# Patient Record
Sex: FEMALE | Race: WHITE | NOT HISPANIC OR LATINO | Employment: OTHER | URBAN - METROPOLITAN AREA
[De-identification: names, ages, dates, MRNs, and addresses within clinical notes are randomized per-mention and may not be internally consistent; named-entity substitution may affect disease eponyms.]

---

## 2017-05-30 ENCOUNTER — ALLSCRIPTS OFFICE VISIT (OUTPATIENT)
Dept: OTHER | Facility: OTHER | Age: 67
End: 2017-05-30

## 2018-01-14 VITALS
HEART RATE: 64 BPM | BODY MASS INDEX: 33.65 KG/M2 | WEIGHT: 209.38 LBS | DIASTOLIC BLOOD PRESSURE: 68 MMHG | HEIGHT: 66 IN | RESPIRATION RATE: 16 BRPM | SYSTOLIC BLOOD PRESSURE: 118 MMHG

## 2018-03-01 ENCOUNTER — TELEPHONE (OUTPATIENT)
Dept: CARDIOLOGY CLINIC | Facility: CLINIC | Age: 68
End: 2018-03-01

## 2018-03-01 NOTE — TELEPHONE ENCOUNTER
Pt recently switched to Raul Leon with Addie Arias as secondary  Bystolic now requires preauthorization and new pharmacy is [facility] ,due to insurance change  Pt brought in authorization phone # [phone] and CASE # [id number]   I tried to start this but was told in is an exclusive medication and the doctor would need to call insurance directly  Not able to be preauthed at number called  Patient given samples  Until we get this worked out  Called [facility] her insurance is [facility] Part D  [phone]   CARD ID #[id number] (QSDQ number was same as above  #) told you need to call [phone] for appeal  Pt states you have tried several medications and this actually worked  Pharmacy states he cost without appeal would be $[phone]  Thanks  This can be done once you are back in office

## 2018-03-12 NOTE — TELEPHONE ENCOUNTER
Dr Kp Bustillo filled out a form that was faxed but received denial   Needs appeal if to continue on Bystolic

## 2018-03-19 RX ORDER — NEBIVOLOL 5 MG/1
1 TABLET ORAL DAILY
COMMUNITY
Start: 2015-10-13 | End: 2018-07-09 | Stop reason: SDUPTHER

## 2018-03-19 RX ORDER — METOPROLOL SUCCINATE 50 MG/1
100 TABLET, EXTENDED RELEASE ORAL
COMMUNITY
Start: 2015-02-24 | End: 2018-07-09 | Stop reason: ALTCHOICE

## 2018-03-19 RX ORDER — PROPYLTHIOURACIL 50 MG/1
100 TABLET ORAL
COMMUNITY
Start: 2015-02-24 | End: 2018-07-09 | Stop reason: ALTCHOICE

## 2018-03-19 RX ORDER — LEVOTHYROXINE SODIUM 0.1 MG/1
100 TABLET ORAL DAILY
Refills: 2 | COMMUNITY
Start: 2018-02-12

## 2018-03-19 RX ORDER — OMEPRAZOLE 20 MG/1
20 CAPSULE, DELAYED RELEASE ORAL DAILY
COMMUNITY
Start: 2011-09-06 | End: 2020-01-20 | Stop reason: SDUPTHER

## 2018-06-04 ENCOUNTER — TELEPHONE (OUTPATIENT)
Dept: CARDIOLOGY CLINIC | Facility: CLINIC | Age: 68
End: 2018-06-04

## 2018-06-04 NOTE — TELEPHONE ENCOUNTER
Pt stopped into office to see if Bystolic sample is available until her F/U OV with [facility] on July 9th  Provided Bystolic 5 mg  Lot # [id number] Exp 10/20 X3 & WO 1539 Exp 04/20

## 2018-07-09 ENCOUNTER — OFFICE VISIT (OUTPATIENT)
Dept: CARDIOLOGY CLINIC | Facility: CLINIC | Age: 68
End: 2018-07-09
Payer: MEDICARE

## 2018-07-09 VITALS
WEIGHT: 216 LBS | RESPIRATION RATE: 16 BRPM | HEIGHT: 66 IN | SYSTOLIC BLOOD PRESSURE: 122 MMHG | DIASTOLIC BLOOD PRESSURE: 72 MMHG | HEART RATE: 66 BPM | BODY MASS INDEX: 34.72 KG/M2

## 2018-07-09 DIAGNOSIS — I48.0 PAF (PAROXYSMAL ATRIAL FIBRILLATION) (HCC): Primary | ICD-10-CM

## 2018-07-09 DIAGNOSIS — I10 BENIGN ESSENTIAL HTN: ICD-10-CM

## 2018-07-09 PROBLEM — E89.0 POSTABLATIVE HYPOTHYROIDISM: Status: ACTIVE | Noted: 2018-07-09

## 2018-07-09 PROCEDURE — 99213 OFFICE O/P EST LOW 20 MIN: CPT | Performed by: INTERNAL MEDICINE

## 2018-07-09 PROCEDURE — 93000 ELECTROCARDIOGRAM COMPLETE: CPT | Performed by: INTERNAL MEDICINE

## 2018-07-09 RX ORDER — NEBIVOLOL 5 MG/1
5 TABLET ORAL DAILY
Qty: 90 TABLET | Refills: 3 | Status: SHIPPED | OUTPATIENT
Start: 2018-07-09 | End: 2019-08-27 | Stop reason: SDUPTHER

## 2018-07-09 NOTE — PROGRESS NOTES
Cardiology Follow Up    [facility]  1950  [id number]  [address]    Reason for visit: Patient here for FU of PAF  Yogi Peñaloza Also has HTN    1  PAF (paroxysmal atrial fibrillation) (HCC)  POCT ECG   2  Benign essential HTN         Interval History: Since her last visit she gets occasional palpitations  She has not had the racing heart beat or sustained palpitations  She denies CP or SOB  She does get some ankle edema  She denies lightheadedness    Patient Active Problem List   Diagnosis    PAF (paroxysmal atrial fibrillation) (HCC)    Benign essential HTN    Postablative hypothyroidism     No past medical history on file  Social History     Social History    Marital status: /Civil Union     Spouse name: N/A    Number of children: N/A    Years of education: N/A     Occupational History    Not on file  Social History Main Topics    Smoking status: Passive Smoke Exposure - Never Smoker    Smokeless tobacco: Never Used    Alcohol use Not on file    Drug use: Unknown    Sexual activity: Not on file     Other Topics Concern    Not on file     Social History Narrative    No narrative on file      No family history on file  No past surgical history on file  Current Outpatient Prescriptions:     aspirin 81 MG tablet, Take 1 tablet by mouth daily, Disp: , Rfl:     Cholecalciferol (TH VITAMIN D3) 2000 units CAPS, Take by mouth, Disp: , Rfl:     levothyroxine 100 mcg tablet, Take 100 mcg by mouth daily, Disp: , Rfl: 2    nebivolol (BYSTOLIC) 5 mg tablet, Take 1 tablet by mouth daily, Disp: , Rfl:     omeprazole (PriLOSEC) 20 mg delayed release capsule, Take by mouth, Disp: , Rfl:   Allergies   Allergen Reactions    No Active Allergies          Review of Systems:  Review of Systems   Constitutional: Negative for fatigue  Respiratory: Negative for shortness of breath  Cardiovascular: Positive for palpitations  Negative for chest pain and leg swelling  Genitourinary: Positive for frequency  Musculoskeletal: Positive for arthralgias and back pain  Psychiatric/Behavioral: Negative for agitation, behavioral problems, confusion and decreased concentration  Physical Exam:  Vitals:    07/09/18 1425   BP: 122/72   Pulse: 66   Resp: 16   Weight: 98 kg (216 lb)   Height: 5' 6" (1 676 m)     Physical Exam   Constitutional: She appears well-developed and well-nourished  No distress  obese   HENT:   Head: Normocephalic and atraumatic  Mouth/Throat: No oropharyngeal exudate  Eyes: Conjunctivae are normal  No scleral icterus  Neck: Neck supple  Normal carotid pulses and no JVD present  Carotid bruit is not present  No thyromegaly present  Cardiovascular: Normal rate, regular rhythm, normal heart sounds and intact distal pulses  Exam reveals no gallop and no friction rub  No murmur heard  Pulmonary/Chest: Breath sounds normal  She has no wheezes  She has no rhonchi  She has no rales  Abdominal: Soft  She exhibits no mass  There is no hepatosplenomegaly  There is no tenderness  Musculoskeletal: She exhibits no edema  Discussion/Summary:  1  PAF-relatively quiet since thyroid surgery  Continue ASA as AC strategy (had major GI bleed on  mg daily) Risk of full AC probably outweighs benefit  2  HTN-well controlled on nebivolol    Continue same      FU one year      Alley Galeana MD

## 2019-02-13 ENCOUNTER — APPOINTMENT (INPATIENT)
Dept: RADIOLOGY | Facility: HOSPITAL | Age: 69
DRG: 494 | End: 2019-02-13
Payer: MEDICARE

## 2019-02-13 ENCOUNTER — ANESTHESIA EVENT (INPATIENT)
Dept: PERIOP | Facility: HOSPITAL | Age: 69
DRG: 494 | End: 2019-02-13
Payer: MEDICARE

## 2019-02-13 ENCOUNTER — ANESTHESIA (INPATIENT)
Dept: PERIOP | Facility: HOSPITAL | Age: 69
DRG: 494 | End: 2019-02-13
Payer: MEDICARE

## 2019-02-13 ENCOUNTER — APPOINTMENT (EMERGENCY)
Dept: RADIOLOGY | Facility: HOSPITAL | Age: 69
DRG: 494 | End: 2019-02-13
Payer: MEDICARE

## 2019-02-13 ENCOUNTER — HOSPITAL ENCOUNTER (INPATIENT)
Facility: HOSPITAL | Age: 69
LOS: 1 days | Discharge: HOME | DRG: 494 | End: 2019-02-14
Attending: EMERGENCY MEDICINE | Admitting: INTERNAL MEDICINE
Payer: MEDICARE

## 2019-02-13 DIAGNOSIS — S82.842A BIMALLEOLAR ANKLE FRACTURE, LEFT, CLOSED, INITIAL ENCOUNTER: Primary | ICD-10-CM

## 2019-02-13 DIAGNOSIS — Z01.810 PRE-OPERATIVE CARDIOVASCULAR EXAM, NEW EKG ABNORMALITIES C/W ISCHEMIA: ICD-10-CM

## 2019-02-13 DIAGNOSIS — R94.31 PRE-OPERATIVE CARDIOVASCULAR EXAM, NEW EKG ABNORMALITIES C/W ISCHEMIA: ICD-10-CM

## 2019-02-13 PROBLEM — S82.402A: Status: ACTIVE | Noted: 2019-02-13
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
## 2019-02-13 PROBLEM — S82.52XA CLOSED FRACTURE OF MEDIAL MALLEOLUS OF LEFT TIBIA: Status: ACTIVE | Noted: 2019-02-13

## 2019-02-13 LAB
ANION GAP SERPL CALCULATED.3IONS-SCNC: 10 MMOL/L (ref 4–13)
ATRIAL RATE: 61 BPM
BASOPHILS # BLD AUTO: 0.04 THOUSANDS/ΜL (ref 0–0.1)
BASOPHILS NFR BLD AUTO: 0 % (ref 0–1)
BUN SERPL-MCNC: 18 MG/DL (ref 5–25)
CALCIUM SERPL-MCNC: 8.1 MG/DL (ref 8.3–10.1)
CHLORIDE SERPL-SCNC: 105 MMOL/L (ref 100–108)
CO2 SERPL-SCNC: 28 MMOL/L (ref 21–32)
CREAT SERPL-MCNC: 0.72 MG/DL (ref 0.6–1.3)
EOSINOPHIL # BLD AUTO: 0.06 THOUSAND/ΜL (ref 0–0.61)
EOSINOPHIL NFR BLD AUTO: 1 % (ref 0–6)
ERYTHROCYTE [DISTWIDTH] IN BLOOD BY AUTOMATED COUNT: 12.9 % (ref 11.6–15.1)
GFR SERPL CREATININE-BSD FRML MDRD: 88 ML/MIN/1.73SQ M
GLUCOSE SERPL-MCNC: 118 MG/DL (ref 65–140)
HCT VFR BLD AUTO: 45.6 % (ref 34.8–46.1)
HGB BLD-MCNC: 14.7 G/DL (ref 11.5–15.4)
IMM GRANULOCYTES # BLD AUTO: 0.04 THOUSAND/UL (ref 0–0.2)
IMM GRANULOCYTES NFR BLD AUTO: 0 % (ref 0–2)
LYMPHOCYTES # BLD AUTO: 1.32 THOUSANDS/ΜL (ref 0.6–4.47)
LYMPHOCYTES NFR BLD AUTO: 12 % (ref 14–44)
MCH RBC QN AUTO: 30.2 PG (ref 26.8–34.3)
MCHC RBC AUTO-ENTMCNC: 32.2 G/DL (ref 31.4–37.4)
MCV RBC AUTO: 94 FL (ref 82–98)
MONOCYTES # BLD AUTO: 0.72 THOUSAND/ΜL (ref 0.17–1.22)
MONOCYTES NFR BLD AUTO: 6 % (ref 4–12)
NEUTROPHILS # BLD AUTO: 9.1 THOUSANDS/ΜL (ref 1.85–7.62)
NEUTS SEG NFR BLD AUTO: 81 % (ref 43–75)
NRBC BLD AUTO-RTO: 0 /100 WBCS
P AXIS: 48 DEGREES
PLATELET # BLD AUTO: 268 THOUSANDS/UL (ref 149–390)
PMV BLD AUTO: 9.8 FL (ref 8.9–12.7)
POTASSIUM SERPL-SCNC: 3.9 MMOL/L (ref 3.5–5.3)
PR INTERVAL: 146 MS
QRS AXIS: 18 DEGREES
QRSD INTERVAL: 82 MS
QT INTERVAL: 410 MS
QTC INTERVAL: 412 MS
RBC # BLD AUTO: 4.86 MILLION/UL (ref 3.81–5.12)
SODIUM SERPL-SCNC: 143 MMOL/L (ref 136–145)
T WAVE AXIS: -4 DEGREES
TROPONIN I SERPL-MCNC: <0.02 NG/ML
VENTRICULAR RATE: 61 BPM
WBC # BLD AUTO: 11.28 THOUSAND/UL (ref 4.31–10.16)

## 2019-02-13 PROCEDURE — 27814 TREATMENT OF ANKLE FRACTURE: CPT | Performed by: ORTHOPAEDIC SURGERY

## 2019-02-13 PROCEDURE — 84484 ASSAY OF TROPONIN QUANT: CPT | Performed by: INTERNAL MEDICINE

## 2019-02-13 PROCEDURE — 0QSH0ZZ REPOSITION LEFT TIBIA, OPEN APPROACH: ICD-10-PCS | Performed by: INTERNAL MEDICINE

## 2019-02-13 PROCEDURE — 99223 1ST HOSP IP/OBS HIGH 75: CPT | Performed by: ORTHOPAEDIC SURGERY

## 2019-02-13 PROCEDURE — C1713 ANCHOR/SCREW BN/BN,TIS/BN: HCPCS | Performed by: ORTHOPAEDIC SURGERY

## 2019-02-13 PROCEDURE — 73610 X-RAY EXAM OF ANKLE: CPT

## 2019-02-13 PROCEDURE — 99221 1ST HOSP IP/OBS SF/LOW 40: CPT | Performed by: INTERNAL MEDICINE

## 2019-02-13 PROCEDURE — 99223 1ST HOSP IP/OBS HIGH 75: CPT | Performed by: NURSE PRACTITIONER

## 2019-02-13 PROCEDURE — 93005 ELECTROCARDIOGRAM TRACING: CPT

## 2019-02-13 PROCEDURE — 80048 BASIC METABOLIC PNL TOTAL CA: CPT | Performed by: EMERGENCY MEDICINE

## 2019-02-13 PROCEDURE — 85025 COMPLETE CBC W/AUTO DIFF WBC: CPT | Performed by: EMERGENCY MEDICINE

## 2019-02-13 PROCEDURE — 99285 EMERGENCY DEPT VISIT HI MDM: CPT

## 2019-02-13 PROCEDURE — 36415 COLL VENOUS BLD VENIPUNCTURE: CPT | Performed by: EMERGENCY MEDICINE

## 2019-02-13 PROCEDURE — 0QSK04Z REPOSITION LEFT FIBULA WITH INTERNAL FIXATION DEVICE, OPEN APPROACH: ICD-10-PCS | Performed by: INTERNAL MEDICINE

## 2019-02-13 PROCEDURE — 96375 TX/PRO/DX INJ NEW DRUG ADDON: CPT

## 2019-02-13 PROCEDURE — 96374 THER/PROPH/DIAG INJ IV PUSH: CPT

## 2019-02-13 PROCEDURE — 93010 ELECTROCARDIOGRAM REPORT: CPT | Performed by: INTERNAL MEDICINE

## 2019-02-13 PROCEDURE — C1769 GUIDE WIRE: HCPCS | Performed by: ORTHOPAEDIC SURGERY

## 2019-02-13 PROCEDURE — 27814 TREATMENT OF ANKLE FRACTURE: CPT | Performed by: PHYSICIAN ASSISTANT

## 2019-02-13 DEVICE — 4.0MM CANCELLOUS BONE SCREW FULLY THREADED/16MM: Type: IMPLANTABLE DEVICE | Site: ANKLE | Status: FUNCTIONAL

## 2019-02-13 DEVICE — 4.0MM CANCELLOUS BONE SCREW FULLY THREADED/18MM: Type: IMPLANTABLE DEVICE | Site: ANKLE | Status: FUNCTIONAL

## 2019-02-13 DEVICE — LCP ONE-THIRD TUBULAR PLATE WITH COLLAR 7 HOLES/81MM
Type: IMPLANTABLE DEVICE | Site: ANKLE | Status: FUNCTIONAL
Brand: LCP

## 2019-02-13 DEVICE — 4.0MM CANNULATED SCREW LONG THREAD/30MM: Type: IMPLANTABLE DEVICE | Site: ANKLE | Status: FUNCTIONAL

## 2019-02-13 DEVICE — 3.5MM CORTEX SCREW SELF-TAPPING 26MM: Type: IMPLANTABLE DEVICE | Site: ANKLE | Status: FUNCTIONAL

## 2019-02-13 DEVICE — 3.5MM CORTEX SCREW SELF-TAPPING 14MM: Type: IMPLANTABLE DEVICE | Site: ANKLE | Status: FUNCTIONAL

## 2019-02-13 DEVICE — 4.0MM CANNULATED SCREW-LONG THREAD 52MM: Type: IMPLANTABLE DEVICE | Site: ANKLE | Status: FUNCTIONAL

## 2019-02-13 RX ORDER — PROPOFOL 10 MG/ML
INJECTION, EMULSION INTRAVENOUS AS NEEDED
Status: DISCONTINUED | OUTPATIENT
Start: 2019-02-13 | End: 2019-02-13 | Stop reason: SURG

## 2019-02-13 RX ORDER — HYDROMORPHONE HCL 110MG/55ML
2 PATIENT CONTROLLED ANALGESIA SYRINGE INTRAVENOUS ONCE
Status: COMPLETED | OUTPATIENT
Start: 2019-02-13 | End: 2019-02-13

## 2019-02-13 RX ORDER — OXYCODONE HYDROCHLORIDE 5 MG/1
5 TABLET ORAL
Status: DISCONTINUED | OUTPATIENT
Start: 2019-02-13 | End: 2019-02-14 | Stop reason: HOSPADM

## 2019-02-13 RX ORDER — HYDROMORPHONE HCL/PF 1 MG/ML
0.5 SYRINGE (ML) INJECTION
Status: DISCONTINUED | OUTPATIENT
Start: 2019-02-13 | End: 2019-02-13 | Stop reason: HOSPADM

## 2019-02-13 RX ORDER — ONDANSETRON 2 MG/ML
4 INJECTION INTRAMUSCULAR; INTRAVENOUS EVERY 6 HOURS PRN
Status: DISCONTINUED | OUTPATIENT
Start: 2019-02-13 | End: 2019-02-13 | Stop reason: SDUPTHER

## 2019-02-13 RX ORDER — FENTANYL CITRATE/PF 50 MCG/ML
25 SYRINGE (ML) INJECTION
Status: DISCONTINUED | OUTPATIENT
Start: 2019-02-13 | End: 2019-02-13 | Stop reason: HOSPADM

## 2019-02-13 RX ORDER — SODIUM CHLORIDE 9 MG/ML
125 INJECTION, SOLUTION INTRAVENOUS CONTINUOUS
Status: DISCONTINUED | OUTPATIENT
Start: 2019-02-13 | End: 2019-02-13 | Stop reason: SDUPTHER

## 2019-02-13 RX ORDER — DEXAMETHASONE SODIUM PHOSPHATE 4 MG/ML
INJECTION, SOLUTION INTRA-ARTICULAR; INTRALESIONAL; INTRAMUSCULAR; INTRAVENOUS; SOFT TISSUE AS NEEDED
Status: DISCONTINUED | OUTPATIENT
Start: 2019-02-13 | End: 2019-02-13 | Stop reason: SURG

## 2019-02-13 RX ORDER — ACETAMINOPHEN 325 MG/1
650 TABLET ORAL EVERY 6 HOURS PRN
Status: DISCONTINUED | OUTPATIENT
Start: 2019-02-13 | End: 2019-02-13 | Stop reason: SDUPTHER

## 2019-02-13 RX ORDER — ACETAMINOPHEN 325 MG/1
650 TABLET ORAL EVERY 6 HOURS PRN
Status: DISCONTINUED | OUTPATIENT
Start: 2019-02-13 | End: 2019-02-14 | Stop reason: HOSPADM

## 2019-02-13 RX ORDER — DOCUSATE SODIUM 100 MG/1
100 CAPSULE, LIQUID FILLED ORAL 2 TIMES DAILY
Status: DISCONTINUED | OUTPATIENT
Start: 2019-02-13 | End: 2019-02-14 | Stop reason: HOSPADM

## 2019-02-13 RX ORDER — MAGNESIUM HYDROXIDE 1200 MG/15ML
LIQUID ORAL AS NEEDED
Status: DISCONTINUED | OUTPATIENT
Start: 2019-02-13 | End: 2019-02-13 | Stop reason: HOSPADM

## 2019-02-13 RX ORDER — SODIUM CHLORIDE 9 MG/ML
125 INJECTION, SOLUTION INTRAVENOUS CONTINUOUS
Status: DISCONTINUED | OUTPATIENT
Start: 2019-02-13 | End: 2019-02-13

## 2019-02-13 RX ORDER — CEFAZOLIN SODIUM 2 G/50ML
2000 SOLUTION INTRAVENOUS ONCE
Status: COMPLETED | OUTPATIENT
Start: 2019-02-13 | End: 2019-02-13

## 2019-02-13 RX ORDER — FENTANYL CITRATE 50 UG/ML
INJECTION, SOLUTION INTRAMUSCULAR; INTRAVENOUS AS NEEDED
Status: DISCONTINUED | OUTPATIENT
Start: 2019-02-13 | End: 2019-02-13 | Stop reason: SURG

## 2019-02-13 RX ORDER — OXYCODONE HYDROCHLORIDE 10 MG/1
10 TABLET ORAL
Status: DISCONTINUED | OUTPATIENT
Start: 2019-02-13 | End: 2019-02-14 | Stop reason: HOSPADM

## 2019-02-13 RX ORDER — MIDAZOLAM HYDROCHLORIDE 1 MG/ML
INJECTION INTRAMUSCULAR; INTRAVENOUS AS NEEDED
Status: DISCONTINUED | OUTPATIENT
Start: 2019-02-13 | End: 2019-02-13 | Stop reason: SURG

## 2019-02-13 RX ORDER — EPHEDRINE SULFATE 50 MG/ML
INJECTION, SOLUTION INTRAVENOUS AS NEEDED
Status: DISCONTINUED | OUTPATIENT
Start: 2019-02-13 | End: 2019-02-13 | Stop reason: SURG

## 2019-02-13 RX ORDER — ONDANSETRON 2 MG/ML
4 INJECTION INTRAMUSCULAR; INTRAVENOUS EVERY 6 HOURS PRN
Status: DISCONTINUED | OUTPATIENT
Start: 2019-02-13 | End: 2019-02-14 | Stop reason: HOSPADM

## 2019-02-13 RX ORDER — MORPHINE SULFATE 4 MG/ML
4 INJECTION, SOLUTION INTRAMUSCULAR; INTRAVENOUS ONCE
Status: COMPLETED | OUTPATIENT
Start: 2019-02-13 | End: 2019-02-13

## 2019-02-13 RX ORDER — LEVOTHYROXINE SODIUM 0.1 MG/1
100 TABLET ORAL DAILY
Status: DISCONTINUED | OUTPATIENT
Start: 2019-02-14 | End: 2019-02-14 | Stop reason: HOSPADM

## 2019-02-13 RX ORDER — MELATONIN
1000 DAILY
Status: DISCONTINUED | OUTPATIENT
Start: 2019-02-14 | End: 2019-02-14 | Stop reason: HOSPADM

## 2019-02-13 RX ORDER — ROCURONIUM BROMIDE 10 MG/ML
INJECTION, SOLUTION INTRAVENOUS AS NEEDED
Status: DISCONTINUED | OUTPATIENT
Start: 2019-02-13 | End: 2019-02-13 | Stop reason: SURG

## 2019-02-13 RX ORDER — ONDANSETRON 2 MG/ML
4 INJECTION INTRAMUSCULAR; INTRAVENOUS ONCE
Status: DISCONTINUED | OUTPATIENT
Start: 2019-02-13 | End: 2019-02-13 | Stop reason: HOSPADM

## 2019-02-13 RX ORDER — HEPARIN SODIUM 5000 [USP'U]/ML
5000 INJECTION, SOLUTION INTRAVENOUS; SUBCUTANEOUS EVERY 8 HOURS SCHEDULED
Status: DISCONTINUED | OUTPATIENT
Start: 2019-02-13 | End: 2019-02-13 | Stop reason: SDUPTHER

## 2019-02-13 RX ORDER — NEBIVOLOL 10 MG/1
5 TABLET ORAL DAILY
Status: DISCONTINUED | OUTPATIENT
Start: 2019-02-14 | End: 2019-02-14 | Stop reason: SDUPTHER

## 2019-02-13 RX ORDER — SODIUM CHLORIDE 9 MG/ML
75 INJECTION, SOLUTION INTRAVENOUS CONTINUOUS
Status: DISCONTINUED | OUTPATIENT
Start: 2019-02-13 | End: 2019-02-14 | Stop reason: HOSPADM

## 2019-02-13 RX ORDER — GLYCOPYRROLATE 0.2 MG/ML
INJECTION INTRAMUSCULAR; INTRAVENOUS AS NEEDED
Status: DISCONTINUED | OUTPATIENT
Start: 2019-02-13 | End: 2019-02-13 | Stop reason: SURG

## 2019-02-13 RX ORDER — NEBIVOLOL 10 MG/1
5 TABLET ORAL DAILY
Status: DISCONTINUED | OUTPATIENT
Start: 2019-02-13 | End: 2019-02-14 | Stop reason: HOSPADM

## 2019-02-13 RX ORDER — HYDROMORPHONE HCL/PF 1 MG/ML
0.5 SYRINGE (ML) INJECTION
Status: DISCONTINUED | OUTPATIENT
Start: 2019-02-13 | End: 2019-02-14 | Stop reason: HOSPADM

## 2019-02-13 RX ORDER — ASPIRIN 81 MG/1
81 TABLET, CHEWABLE ORAL DAILY
Status: DISCONTINUED | OUTPATIENT
Start: 2019-02-14 | End: 2019-02-14 | Stop reason: HOSPADM

## 2019-02-13 RX ORDER — ONDANSETRON 2 MG/ML
INJECTION INTRAMUSCULAR; INTRAVENOUS AS NEEDED
Status: DISCONTINUED | OUTPATIENT
Start: 2019-02-13 | End: 2019-02-13 | Stop reason: SURG

## 2019-02-13 RX ORDER — PANTOPRAZOLE SODIUM 20 MG/1
20 TABLET, DELAYED RELEASE ORAL
Status: DISCONTINUED | OUTPATIENT
Start: 2019-02-14 | End: 2019-02-14 | Stop reason: HOSPADM

## 2019-02-13 RX ORDER — ROPIVACAINE HYDROCHLORIDE 5 MG/ML
INJECTION, SOLUTION EPIDURAL; INFILTRATION; PERINEURAL
Status: COMPLETED | OUTPATIENT
Start: 2019-02-13 | End: 2019-02-13

## 2019-02-13 RX ORDER — CALCIUM CARBONATE 200(500)MG
1000 TABLET,CHEWABLE ORAL DAILY PRN
Status: DISCONTINUED | OUTPATIENT
Start: 2019-02-13 | End: 2019-02-14 | Stop reason: HOSPADM

## 2019-02-13 RX ORDER — NEOSTIGMINE METHYLSULFATE 1 MG/ML
INJECTION INTRAVENOUS AS NEEDED
Status: DISCONTINUED | OUTPATIENT
Start: 2019-02-13 | End: 2019-02-13 | Stop reason: SURG

## 2019-02-13 RX ADMIN — SODIUM CHLORIDE 75 ML/HR: 0.9 INJECTION, SOLUTION INTRAVENOUS at 22:55

## 2019-02-13 RX ADMIN — ROCURONIUM BROMIDE 20 MG: 10 INJECTION INTRAVENOUS at 13:19

## 2019-02-13 RX ADMIN — CEFAZOLIN SODIUM 2000 MG: 10 INJECTION, POWDER, FOR SOLUTION INTRAVENOUS at 20:00

## 2019-02-13 RX ADMIN — MIDAZOLAM 1 MG: 1 INJECTION INTRAMUSCULAR; INTRAVENOUS at 10:50

## 2019-02-13 RX ADMIN — ONDANSETRON 4 MG: 2 INJECTION INTRAMUSCULAR; INTRAVENOUS at 14:13

## 2019-02-13 RX ADMIN — SODIUM CHLORIDE 75 ML/HR: 0.9 INJECTION, SOLUTION INTRAVENOUS at 17:58

## 2019-02-13 RX ADMIN — PROPOFOL 200 MG: 10 INJECTION, EMULSION INTRAVENOUS at 11:29

## 2019-02-13 RX ADMIN — ROCURONIUM BROMIDE 50 MG: 10 INJECTION INTRAVENOUS at 11:31

## 2019-02-13 RX ADMIN — SODIUM CHLORIDE: 0.9 INJECTION, SOLUTION INTRAVENOUS at 11:57

## 2019-02-13 RX ADMIN — MIDAZOLAM 2 MG: 1 INJECTION INTRAMUSCULAR; INTRAVENOUS at 11:21

## 2019-02-13 RX ADMIN — ROPIVACAINE HYDROCHLORIDE 25 ML: 5 INJECTION, SOLUTION EPIDURAL; INFILTRATION; PERINEURAL at 10:51

## 2019-02-13 RX ADMIN — LIDOCAINE HYDROCHLORIDE 50 MG: 20 INJECTION, SOLUTION INTRAVENOUS at 11:29

## 2019-02-13 RX ADMIN — SODIUM CHLORIDE: 0.9 INJECTION, SOLUTION INTRAVENOUS at 11:18

## 2019-02-13 RX ADMIN — FENTANYL CITRATE 25 MCG: 50 INJECTION, SOLUTION INTRAMUSCULAR; INTRAVENOUS at 10:51

## 2019-02-13 RX ADMIN — FENTANYL CITRATE 50 MCG: 50 INJECTION, SOLUTION INTRAMUSCULAR; INTRAVENOUS at 13:20

## 2019-02-13 RX ADMIN — SODIUM CHLORIDE 125 ML/HR: 0.9 INJECTION, SOLUTION INTRAVENOUS at 10:19

## 2019-02-13 RX ADMIN — NEOSTIGMINE METHYLSULFATE 3 MG: 1 INJECTION INTRAVENOUS at 14:13

## 2019-02-13 RX ADMIN — HYDROMORPHONE HYDROCHLORIDE 2 MG: 2 INJECTION INTRAMUSCULAR; INTRAVENOUS; SUBCUTANEOUS at 05:39

## 2019-02-13 RX ADMIN — DOCUSATE SODIUM 100 MG: 100 CAPSULE, LIQUID FILLED ORAL at 17:57

## 2019-02-13 RX ADMIN — FENTANYL CITRATE 50 MCG: 50 INJECTION, SOLUTION INTRAMUSCULAR; INTRAVENOUS at 10:46

## 2019-02-13 RX ADMIN — FENTANYL CITRATE 150 MCG: 50 INJECTION, SOLUTION INTRAMUSCULAR; INTRAVENOUS at 11:29

## 2019-02-13 RX ADMIN — EPHEDRINE SULFATE 10 MG: 50 INJECTION, SOLUTION INTRAMUSCULAR; INTRAVENOUS; SUBCUTANEOUS at 13:46

## 2019-02-13 RX ADMIN — MORPHINE SULFATE 4 MG: 4 INJECTION INTRAVENOUS at 05:12

## 2019-02-13 RX ADMIN — SODIUM CHLORIDE: 0.9 INJECTION, SOLUTION INTRAVENOUS at 14:23

## 2019-02-13 RX ADMIN — MIDAZOLAM 1 MG: 1 INJECTION INTRAMUSCULAR; INTRAVENOUS at 11:04

## 2019-02-13 RX ADMIN — ROPIVACAINE HYDROCHLORIDE 15 ML: 5 INJECTION, SOLUTION EPIDURAL; INFILTRATION; PERINEURAL at 11:03

## 2019-02-13 RX ADMIN — GLYCOPYRROLATE 0.4 MG: 0.2 INJECTION, SOLUTION INTRAMUSCULAR; INTRAVENOUS at 14:13

## 2019-02-13 RX ADMIN — DEXAMETHASONE SODIUM PHOSPHATE 4 MG: 4 INJECTION, SOLUTION INTRAMUSCULAR; INTRAVENOUS at 12:33

## 2019-02-13 RX ADMIN — CEFAZOLIN SODIUM 2000 MG: 2 SOLUTION INTRAVENOUS at 11:29

## 2019-02-13 RX ADMIN — FENTANYL CITRATE 25 MCG: 50 INJECTION, SOLUTION INTRAMUSCULAR; INTRAVENOUS at 11:04

## 2019-02-13 RX ADMIN — MIDAZOLAM 2 MG: 1 INJECTION INTRAMUSCULAR; INTRAVENOUS at 10:46

## 2019-02-13 NOTE — PROGRESS NOTES
Progress Note - Orthopedics   Elsie Oar 79 y o  female MRN: [id number]  Unit/Bed#: [facility] AC Encounter: [id number]    Assessment:  79 y o  female s/p ORIF left ankle fracture POD 0    Plan:  Pain control prn  PT/OT- NWB left LE   Lovenox/SCDs for DVT prophylaxis  Abx x 24 h    Subjective: Pt S&E  Resting comfortably  No complaints  Vitals: Blood pressure 134/63, pulse 80, temperature 98 7 °F (37 1 °C), resp  rate (!) 11, weight 101 kg (223 lb 12 3 oz), SpO2 97 %  ,Body mass index is 36 12 kg/m²        Intake/Output Summary (Last 24 hours) at 2/13/2019 1453  Last data filed at 2/13/2019 1423  Gross per 24 hour   Intake 2000 ml   Output 50 ml   Net 1950 ml       Invasive Devices     Peripheral Intravenous Line            Peripheral IV 02/13/19 Right Antecubital less than 1 day                Ortho Exam: leftLE:  splint:  C/D/I, sensation grossly intact L4, L5, S1, brisk capillary refill, EHL/FHL intact    Lab, Imaging and other studies:   CBC:   Lab Results   Component Value Date    WBC 11 28 (H) 02/13/2019    HGB 14 7 02/13/2019    HCT 45 6 02/13/2019    MCV 94 02/13/2019     02/13/2019    MCH 30 2 02/13/2019    MCHC 32 2 02/13/2019    RDW 12 9 02/13/2019    MPV 9 8 02/13/2019    NRBC 0 02/13/2019     CMP:   Lab Results   Component Value Date    SODIUM 143 02/13/2019     02/13/2019    CO2 28 02/13/2019    BUN 18 02/13/2019    CREATININE 0 72 02/13/2019    CALCIUM 8 1 (L) 02/13/2019    EGFR 88 02/13/2019

## 2019-02-13 NOTE — ASSESSMENT & PLAN NOTE
· XR Left: Acute displaced distal fibular and medial malleolar fractures with evidence of ligamentous injury  · Orthopedic consult  · NWB left leg  · Ice Pack  · Elevated extremity  · NPO for possible surgical intervention today  · Pain control

## 2019-02-13 NOTE — OP NOTE
OPERATIVE REPORT  PATIENT NAME: Man Hammer    :  1950  MRN: [id number]  Pt Location: [facility] OR ROOM 03    SURGERY DATE: 2019    Surgeon(s) and Role:     * Keke Cabrera DO - Primary     * Mary Serrano PA-C - Assisting    Preop Diagnosis:  Bimalleolar ankle fracture, left, closed, initial encounter [S82 842A]    Post-Op Diagnosis Codes: * Bimalleolar ankle fracture, left, closed, initial encounter [S82 842A]    Procedure(s) (LRB):  OPEN REDUCTION W/ INTERNAL FIXATION (ORIF) ANKLE (Left)    Specimen(s):  * No specimens in log *    Estimated Blood Loss:   Minimal    Drains:  * No LDAs found *    Anesthesia Type: Adductor block, GA    Operative Indications:  Bimalleolar ankle fracture, left, closed, initial encounter [S82 842A]    Operative Findings:  See below    Complications:   None    Procedure and Technique:  INDICATIONS FOR PROCEDURE:  The patients is a 79 y o  female who presented with ankle pain after injury  Surgery was recommended due to the displacement and angulation of the fracture as well as the unstable nature of the fracture  Extensive counseling in regards to the reasons for surgical intervention as well as the risks and benefits of surgery were reviewed  The risks include, but are not limited to infection, blood clots, wound healing problems, complex regional pain syndrome, further fracture, failure of hardware, need for additional surgery, malunion, nonunion, heart attack, stroke, death  The patient understood and agreed to by oral and written consent  OPERATIVE PROCEDURE:  The patient was identified as Man Hammer by her ID bracelet by the surgical staff in the preoperative area at [address] [facility]   An adductor block was performed and preoperative antibiotics were given  The patient was wheeled back to the surgical room and placed on the operative table  Anesthesia was administered without complications      The patient remained in the supine position and all bony prominences were carefully protected  A tourniquet was applied to the patients left upper thigh  The left leg was then prepped and draped in the usual sterile fashion  A timeout was performed where the patients name and surgical site were once again identified  The incision was marked out  An esmarch was used and the tourniquet was inflated to 300 mmHg  An incision was made over the lateral aspect of the lateral malleolus  Dissection was made through the skin and subcutaneous tissue  The superficial peroneal nerve was identified and protected throughout the case  Once down to bone, the fracture fragments were identified and reduced  This was confirmed with XR  An interfragmentary screw was placed to compress the fracture and then a 1/3 tubular plate was placed over the lateral aspect of the lateral malleolus to neutralize rotational forces  Screws were placed through the plate  Hardware placement was found to be adequate on XR  Next, an incision was marked out and then made over the distal aspect of the medial malleolus  Dissection was made through the skin and subcutaneous tissue  Once down to bone the fracture fragment was identified and reduced  XR was used to confirm alignment  Two wires were placed across the fracture sight followed by 2 cannulated screws under fluoroscopic guidance  Final fixation was confirmed with XR  Finally, an external rotation stress test was performed and the syndesmotic ligament was found to be stable and additional fixation was unneeded  Final XRs were taken  The incisions were copiously irrigated with saline solution  #1 vicryl was used to placed interrupted sutures in deep tissue  The skin and subcutaneous tissue were closed with #2-0 vicryl and the skin was closed with #3-0 nylon  Xeroform and a sterile dressing were placed  The patient was placed in a well-padded splint    The patient was awakened and transferred to the stretcher and then to the recovery room in stable condition  I attest that I was present and performed this procedure  Bibi Doyle PA-C was present for the entire procedure and provided essential assistance with limb position, patient prepping, and retraction    A qualified resident physician was not available    Patient Disposition:  extubated and stable    SIGNATURE: Bienvenido Goddard DO  DATE: February 13, 2019  TIME: 2:39 PM

## 2019-02-13 NOTE — ED PROVIDER NOTES
History  Chief Complaint   Patient presents with    Ankle Injury     Pt c/o left ankle pain and swelling after falling and twisting ankle about one hour PTA; Pt denies hitting her head and denies LOC     Pt is a 79year old female with a PMH of hypertension, hypothyroidism, GERD presenting with left ankle pain after a fall 1 5 hours ago  She states she slipped on the ice and is not sure if she everted or inverted her ankle when she fell, but she now has 6/10 pain in the ankle  She was wearing boots and an ankle brace when she injured her ankle  The ankle immediately became swollen and ecchymosed over the medial malleolus  She was unable to ambulate at the time of injury and now as well  She is able to move her toes and has full sensation  She can weakly flex and extend against resistance  She has 2+ DP on the left  Denies prior fracture in left ankle  Prior to Admission Medications   Prescriptions Last Dose Informant Patient Reported? Taking? Cholecalciferol (TH VITAMIN D3) 2000 units CAPS  Self Yes Yes   Sig: Take 2,000 Units by mouth daily    aspirin 81 MG tablet  Self Yes Yes   Sig: Take 81 mg by mouth daily    levothyroxine 100 mcg tablet  Self Yes Yes   Sig: Take 100 mcg by mouth daily   nebivolol (BYSTOLIC) 5 mg tablet   No Yes   Sig: Take 1 tablet (5 mg total) by mouth daily   omeprazole (PriLOSEC) 20 mg delayed release capsule  Self Yes Yes   Sig: Take 20 mg by mouth daily       Facility-Administered Medications: None       Past Medical History:   Diagnosis Date    Hypertension        Past Surgical History:   Procedure Laterality Date    BREAST LUMPECTOMY      THYROIDECTOMY         Family History   Problem Relation Age of Onset    Lung cancer Family     Coronary artery disease Family      I have reviewed and agree with the history as documented      Social History     Tobacco Use    Smoking status: Passive Smoke Exposure - Never Smoker    Smokeless tobacco: Never Used   Substance Use Topics    Alcohol use: No    Drug use: No        Review of Systems   Constitutional: Negative for activity change, appetite change, chills, diaphoresis, fatigue and fever  Respiratory: Negative for cough, chest tightness and shortness of breath  Cardiovascular: Negative for chest pain  Gastrointestinal: Negative for abdominal pain, constipation, diarrhea, nausea and vomiting  Genitourinary: Negative for decreased urine volume and difficulty urinating  Musculoskeletal: Positive for arthralgias and joint swelling  Neurological: Negative for dizziness, weakness, light-headedness, numbness and headaches  Physical Exam  Physical Exam   Constitutional: She is oriented to person, place, and time  She appears well-developed and well-nourished  No distress  HENT:   Head: Normocephalic and atraumatic  Eyes: Pupils are equal, round, and reactive to light  Conjunctivae and EOM are normal    Neck: Normal range of motion  Neck supple  Cardiovascular: Normal rate, regular rhythm, normal heart sounds and intact distal pulses  Pulses:       Dorsalis pedis pulses are 2+ on the right side, and 2+ on the left side  Pulmonary/Chest: Effort normal and breath sounds normal    Abdominal: Soft  Bowel sounds are normal  She exhibits no distension  There is no tenderness  Musculoskeletal:        Left ankle: She exhibits decreased range of motion, swelling, ecchymosis and deformity  She exhibits no laceration and normal pulse  Tenderness  Lateral malleolus and medial malleolus tenderness found  No head of 5th metatarsal and no proximal fibula tenderness found  Generalized swelling of the left ankle with ecchymosis of the medial malleolus  Deformity and popping sensation during posterior drawer test     Neurological: She is alert and oriented to person, place, and time  No sensory deficit  She exhibits normal muscle tone  Skin: Skin is warm and dry  Capillary refill takes less than 2 seconds   She is not diaphoretic         Vital Signs  ED Triage Vitals [02/13/19 0451]   Temperature Pulse Respirations Blood Pressure SpO2   98 2 °F (36 8 °C) 70 16 166/82 97 %      Temp Source Heart Rate Source Patient Position - Orthostatic VS BP Location FiO2 (%)   Oral Monitor -- -- --      Pain Score       6           Vitals:    02/13/19 0451   BP: 166/82   Pulse: 70       Visual Acuity      ED Medications  Medications   morphine (PF) 4 mg/mL injection 4 mg (has no administration in time range)       Diagnostic Studies  Results Reviewed     None                 XR ankle 3+ views LEFT    (Results Pending)              Procedures  ECG 12 Lead Documentation  Date/Time: 2/13/2019 6:17 AM  Performed by: Ghada Alvarado PA-C  Authorized by: Ghada Alvarado PA-C     ECG reviewed by me, the ED Provider: yes    Patient location:  ED  Previous ECG:     Previous ECG:  Unavailable    Comparison to cardiac monitor: No    Interpretation:     Interpretation: normal    Rate:     ECG rate:  61    ECG rate assessment: normal    Rhythm:     Rhythm: sinus rhythm    Ectopy:     Ectopy: none    QRS:     QRS axis:  Normal    QRS intervals:  Normal  Conduction:     Conduction: normal    T waves:     T waves: non-specific    Static Splint Application  Date/Time: 2/13/2019 6:17 AM  Performed by: Ghada Alvarado PA-C  Authorized by: Ghada Alvarado PA-C     Patient location:  Bedside  Procedure performed by emergency physician: Yes    Other Assisting Provider: Yes (comment) Samia Maxwell    Consent:     Consent obtained:  Verbal    Consent given by:  Patient    Risks discussed:  Discoloration, numbness, pain and swelling    Alternatives discussed:  No treatment, delayed treatment and alternative treatment  Universal protocol:     Patient identity confirmed:  Verbally with patient  Indication:     Indications: fracture    Pre-procedure details:     Sensation:  Normal    Skin color:  Pink  Procedure details:     Laterality:  Left    Location:  Ankle    Ankle:  L ankle    Strapping: no      Splint type:  Long leg and sugar tong    Supplies:  Ortho-Glass  Post-procedure details:     Pain:  Unchanged    Sensation:  Normal    Neurovascular Exam: skin pink, normal pulses and skin intact, warm, and dry      Patient tolerance of procedure: Tolerated well, no immediate complications           Phone Contacts  ED Phone Contact    ED Course                               MDM  Number of Diagnoses or Management Options  Bimalleolar ankle fracture, left, closed, initial encounter:   Diagnosis management comments: Posterior long leg splint with sugar tong, unable to reduce ankle well  Pain controlled with morphine and Dilaudid  It was determined that the patient was unable to use crutches at home and did not feel comfortable with outpatient follow up  She was offered admission and accepted  I spoke to Dr Alanna Duane of orthopedics and she felt that inpatient admission to general medicine was appropriate with consult to orthopedics  Basic labs drawn with normal EKG  Hemodynamically stable  Disposition  Final diagnoses:   None     ED Disposition     None      Follow-up Information    None         Patient's Medications   Discharge Prescriptions    No medications on file     No discharge procedures on file      ED Provider  Electronically Signed by           Bettie Morfin PA-C  02/13/19 5228

## 2019-02-13 NOTE — H&P
H&P- Alfonso Castillo 1950, 79 y o  female MRN: [id number]    Unit/Bed#: ED 14 Encounter: [id number]    Primary Care Provider: Brian Park MD   Date and time admitted to hospital: 2/13/2019  4:49 AM      * Closed fracture of medial malleolus of left tibia  Assessment & Plan  · XR Left: Acute displaced distal fibular and medial malleolar fractures with evidence of ligamentous injury  · Orthopedic consult  · NWB left leg  · Ice Pack  · Elevated extremity  · NPO for possible surgical intervention today  · Pain control    Fall from slipping on ice  Assessment & Plan  · See above plan for fracture  · PT OT consult    PAF (paroxysmal atrial fibrillation) ([facility] [address] )  Assessment & Plan  · As per Cardiology, improved since thyroid surgery  · On ASA 81mg for anticoagulation; had major GI bleed on ASA 384HE  · On Bystolic for rate control    Benign essential HTN  Assessment & Plan  · Maintained on Bystolic      VTE Prophylaxis: Heparin  / reason for no mechanical VTE prophylaxis ac   Code Status: FC  POLST: POLST is not applicable to this patient  Discussion with family:  Spouse at bedside    Anticipated Length of Stay:  Patient will be admitted on an Inpatient basis with an anticipated length of stay of  > 2 midnights  Justification for Hospital Stay:  Fibula and malleolus fracture    Total Time for Visit, including Counseling / Coordination of Care: 45 minutes  Greater than 50% of this total time spent on direct patient counseling and coordination of care  Chief Complaint:   Left ankle pain    History of Present Illness:    Alfonso Castillo is a 79 y o  female who presents with c/o left ankle pain  Patient states she was leaving her house around 3:30a to go to work and slipped on the ice, twisted her left foot and fell on her buttocks  Denies any other symptoms, no numbness, tingling, chest pain, palpitations, shortness of breath, abdominal pain, loss of appetite NVCD or dysuria      Review of Systems:    Review of Systems   Constitutional: Negative  HENT: Negative  Respiratory: Negative  Cardiovascular: Negative  Gastrointestinal: Negative  Genitourinary: Negative  Musculoskeletal:        Left Ankle and buttock pain   Neurological: Negative  Psychiatric/Behavioral: Negative  Past Medical and Surgical History:     Past Medical History:   Diagnosis Date    Hypertension        Past Surgical History:   Procedure Laterality Date    BREAST LUMPECTOMY      THYROIDECTOMY         Meds/Allergies:    Prior to Admission medications    Medication Sig Start Date End Date Taking? Authorizing Provider   aspirin 81 MG tablet Take 81 mg by mouth daily  5/16/14  Yes Historical Provider, MD   Cholecalciferol (TH VITAMIN D3) 2000 units CAPS Take 2,000 Units by mouth daily    Yes Historical Provider, MD   levothyroxine 100 mcg tablet Take 100 mcg by mouth daily 2/12/18  Yes Historical Provider, MD   nebivolol (BYSTOLIC) 5 mg tablet Take 1 tablet (5 mg total) by mouth daily 7/9/18  Yes Anjel Bocanegra MD   omeprazole (PriLOSEC) 20 mg delayed release capsule Take 20 mg by mouth daily  9/6/11  Yes Historical Provider, MD     I have reviewed home medications with patient personally      Allergies: No Known Allergies    Social History:     Marital Status: /Civil Union   Occupation:   Patient Pre-hospital Living Situation: resides at home w spouse  Patient Pre-hospital Level of Mobility: ambulatory  Patient Pre-hospital Diet Restrictions:   Substance Use History:   Social History     Substance and Sexual Activity   Alcohol Use No     Social History     Tobacco Use   Smoking Status Passive Smoke Exposure - Never Smoker   Smokeless Tobacco Never Used     Social History     Substance and Sexual Activity   Drug Use No       Family History:    Family History   Problem Relation Age of Onset    Lung cancer Family     Coronary artery disease Family        Physical Exam:     Vitals:   Blood Pressure: 154/66 (02/13/19 0541)  Pulse: 72 (02/13/19 0541)  Temperature: 98 2 °F (36 8 °C) (02/13/19 0451)  Temp Source: Oral (02/13/19 0451)  Respirations: 16 (02/13/19 0541)  Weight - Scale: 101 kg (223 lb 12 3 oz) (02/13/19 0451)  SpO2: 98 % (02/13/19 0541)    Physical Exam   Constitutional: She appears well-developed and well-nourished  No distress  HENT:   Head: Normocephalic and atraumatic  Eyes: EOM are normal  Right eye exhibits no discharge  Left eye exhibits no discharge  Neck: Neck supple  Cardiovascular: Normal rate, regular rhythm, normal heart sounds and intact distal pulses  No murmur heard  Pulmonary/Chest: Effort normal and breath sounds normal  No stridor  No respiratory distress  She has no wheezes  She has no rales  Abdominal: Soft  Bowel sounds are normal  She exhibits no distension and no mass  There is no tenderness  There is no guarding  Musculoskeletal: She exhibits no edema  Left splint, toes are pink, normal cap refill   Skin: Capillary refill takes less than 2 seconds  She is not diaphoretic  Psychiatric: She has a normal mood and affect  Her behavior is normal  Judgment and thought content normal          Additional Data:     Lab Results: I have personally reviewed pertinent reports  Results from last 7 days   Lab Units 02/13/19  0556   WBC Thousand/uL 11 28*   HEMOGLOBIN g/dL 14 7   HEMATOCRIT % 45 6   PLATELETS Thousands/uL 268   NEUTROS PCT % 81*   LYMPHS PCT % 12*   MONOS PCT % 6   EOS PCT % 1     Results from last 7 days   Lab Units 02/13/19  0556   SODIUM mmol/L 143   POTASSIUM mmol/L 3 9   CHLORIDE mmol/L 105   CO2 mmol/L 28   BUN mg/dL 18   CREATININE mg/dL 0 72   ANION GAP mmol/L 10   CALCIUM mg/dL 8 1*   GLUCOSE RANDOM mg/dL 118                       Imaging: I have personally reviewed pertinent reports        XR ankle 3+ views LEFT   ED Interpretation by Orquidea Vance PA-C (02/13 1928)   bimalleolar fracture       Final Result by Lelsee Cortez MD (02/13 0531)      Acute displaced distal fibular and medial malleolar fractures with evidence of ligamentous injury  The examination demonstrates a significant  finding and was documented as such in Epic for liaison and referring practitioner notification  Workstation performed: [id number]             EKG, Pathology, and Other Studies Reviewed on Admission:   · XR    Allscripts / Epic Records Reviewed: Yes     ** Please Note: This note has been constructed using a voice recognition system   **

## 2019-02-13 NOTE — ANESTHESIA PREPROCEDURE EVALUATION
Review of Systems/Medical History  Patient summary reviewed    History of anesthetic complications PONV    Cardiovascular  Exercise tolerance (METS): >4,  Hypertension controlled,    Pulmonary  Negative pulmonary ROS        GI/Hepatic    GERD well controlled,        Negative  ROS        Endo/Other  History of thyroid disease , hypothyroidism,   Obesity    GYN  Negative gynecology ROS          Hematology  Negative hematology ROS      Musculoskeletal  Negative musculoskeletal ROS   Comment: Left ankle fracture       Neurology  Negative neurology ROS      Psychology   Negative psychology ROS              Physical Exam    Airway    Mallampati score: II  TM Distance: >3 FB  Neck ROM: full     Dental   No notable dental hx     Cardiovascular  Rhythm: regular, Rate: normal,     Pulmonary  Breath sounds clear to auscultation,     Other Findings        Anesthesia Plan  ASA Score- 2     Anesthesia Type- general and regional with ASA Monitors  Additional Monitors:   Airway Plan:     Comment: Consent obtained and placed in chart        Plan Factors-  Patient did not smoke on day of surgery  Induction- intravenous  Postoperative Plan-     Informed Consent- Anesthetic plan and risks discussed with patient

## 2019-02-13 NOTE — ANESTHESIA PROCEDURE NOTES
Peripheral Block    Patient location during procedure: holding area  Start time: 2/13/2019 10:46 AM  Reason for block: at surgeon's request and post-op pain management  Staffing  Anesthesiologist: Anjel Barrett DO  Preanesthetic Checklist  Completed: patient identified, site marked, surgical consent, pre-op evaluation, timeout performed, IV checked, risks and benefits discussed and monitors and equipment checked  Peripheral Block  Patient position: supine  Prep: ChloraPrep  Patient monitoring: continuous pulse ox and frequent blood pressure checks  Block type: popliteal  Laterality: left  Injection technique: single-shot  Procedures: ultrasound guided and nerve stimulator  Ultrasound permanent image savedropivacaine (NAROPIN) 0 5 % perineural infiltration, 25 mL  Needle  Needle type: Stimuplex   Needle gauge: 22 G  Needle length: 10 cm  Needle localization: nerve stimulator  Assessment  Injection assessment: incremental injection, local visualized surrounding nerve on ultrasound, negative aspiration for heme and no paresthesia on injection  Paresthesia pain: none  Heart rate change: no  Slow fractionated injection: yes  Post-procedure:  site cleaned  patient tolerated the procedure well with no immediate complications

## 2019-02-13 NOTE — ED NOTES
Per SLIM, patient to be seen by cardiology for clearance prior to surgery which is tentative for 1030   OR notified on update     Jannet Brunson RN  02/13/19 2645

## 2019-02-13 NOTE — ASSESSMENT & PLAN NOTE
· As per Cardiology, improved since thyroid surgery  · On ASA 81mg for anticoagulation; had major GI bleed on ASA 316SP  · On Bystolic for rate control

## 2019-02-13 NOTE — CONSULTS
Consultation - Leticia Duran 79 y o  female MRN: [id number]  Unit/Bed#: [facility] Encounter: [id number]      Assessment/Plan     Assessment:  L ankle bimalleolar ankle fracture    Plan:  Discussed treatment options with the patient as well as risks and benefits of treatment options  Given the inherent instability and displacement of this fracture surgery is recommended  The risks of surgery include, but are not limited to infection, blood clot, wound healing problems, blood loss, damage to blood vessels and nerves, persistent pain and stiffness, malunion, nonunion, complex regional pain syndrome, damage to tendons and ligaments, need for additional surgery, heart attack, stroke, death  The patient understood and agreed to by oral and written consent  I answered all questions regarding surgery  Will proceed to the OR today  NPO  Pain control p r n  Nonweightbearing left lower extremity  Maintain splint  Patient to follow-up with podiatry or foot and ankle orthopedic specialist regarding foot surgery  Made patient aware we would not be addressing that today  History of Present Illness   Physician Requesting Consult: No att  providers found  Reason for Consult / Principal Problem:  Left ankle fracture  HPI: Kimmy Oshea is a 79 y o  female who presents with left ankle pain status post fall  Patient states she was leaving her house at 3:30 a m  This morning when she stepped out on the ice and fell  She is unable to bear weight after her fall  She denies pain elsewhere  She admits pain in the left ankle  She has a history of surgery on her foot for bunion but states she is having a complication with 1 of the screws  She also notes being flat-footed on that side and uses a support in her shoe  Consults    ROS: see HPI, all other systems negative      Historical Information   Past Medical History:   Diagnosis Date    Hypertension      Past Surgical History:   Procedure Laterality Date  BREAST LUMPECTOMY      THYROIDECTOMY       Social History   Social History     Substance and Sexual Activity   Alcohol Use No     Social History     Substance and Sexual Activity   Drug Use No     Social History     Tobacco Use   Smoking Status Passive Smoke Exposure - Never Smoker   Smokeless Tobacco Never Used     Family History:   Family History   Problem Relation Age of Onset    Lung cancer Family     Coronary artery disease Family        Meds/Allergies   Medications Prior to Admission   Medication    aspirin 81 MG tablet    Cholecalciferol (TH VITAMIN D3) 2000 units CAPS    levothyroxine 100 mcg tablet    nebivolol (BYSTOLIC) 5 mg tablet    omeprazole (PriLOSEC) 20 mg delayed release capsule     No Known Allergies    Objective   Vitals: Blood pressure 138/65, pulse 77, temperature 98 2 °F (36 8 °C), temperature source Oral, resp  rate 16, weight 101 kg (223 lb 12 3 oz), SpO2 97 %  ,Body mass index is 36 12 kg/m²  No intake or output data in the 24 hours ending 02/13/19 1114  No intake/output data recorded      Invasive Devices     Peripheral Intravenous Line            Peripheral IV 02/13/19 Right Antecubital less than 1 day                PE:  Gen:  Awake and alert  HEENT:  Hearing intact  Heart:  Regular rate  Lungs: no audible wheezing  GI: no abdominal distension    Ortho Exam  L ankle:  Splint:  C/D/I  LLE:  Sensation grossly intact L4, L5, S1, brisk capillary refill, EHL/FHL intact    Lab Results:   CBC:   Lab Results   Component Value Date    WBC 11 28 (H) 02/13/2019    HGB 14 7 02/13/2019    HCT 45 6 02/13/2019    MCV 94 02/13/2019     02/13/2019    MCH 30 2 02/13/2019    MCHC 32 2 02/13/2019    RDW 12 9 02/13/2019    MPV 9 8 02/13/2019    NRBC 0 02/13/2019     CMP:   Lab Results   Component Value Date    SODIUM 143 02/13/2019     02/13/2019    CO2 28 02/13/2019    BUN 18 02/13/2019    CREATININE 0 72 02/13/2019    CALCIUM 8 1 (L) 02/13/2019    EGFR 88 02/13/2019     Imaging Studies: I have personally reviewed pertinent films in PACS   X-ray left ankle:  Displaced bimalleolar ankle fracture    Code Status: No Order  Advance Directive and Living Will:      Power of :    POLST:

## 2019-02-13 NOTE — ANESTHESIA PROCEDURE NOTES
Peripheral Block    Patient location during procedure: holding area  Start time: 2/13/2019 10:52 AM  Staffing  Anesthesiologist: Janneth Dia DO  Preanesthetic Checklist  Completed: patient identified, site marked, surgical consent, pre-op evaluation, timeout performed, IV checked, risks and benefits discussed and monitors and equipment checked  Peripheral Block  Patient position: supine  Prep: ChloraPrep  Patient monitoring: heart rate, continuous pulse ox and frequent blood pressure checks  Block type: adductor canal block  Laterality: left  Injection technique: single-shot  Procedures: ultrasound guidedropivacaine (NAROPIN) 0 5 % perineural infiltration, 15 mL  Needle  Needle type: StimupAdoTube   Needle gauge: 22 G  Needle length: 10 cm  Needle localization: ultrasound guidance  Assessment  Injection assessment: incremental injection, local visualized surrounding nerve on ultrasound, negative aspiration for heme and no paresthesia on injection  Paresthesia pain: none  Heart rate change: no  Slow fractionated injection: yes  Post-procedure:  site cleaned  patient tolerated the procedure well with no immediate complications

## 2019-02-13 NOTE — SOCIAL WORK
Federico Garcia is a  63years old  female who was admitted as a result of ankle injury  Patient was alert and oriented times 3  Patient identify her  as emergency  Gaurav Leger   Patient resides at [address] [place] [address] in a single 2 story house with 2 steps to reach main entrance , patient's bedroom and  Bathroom located on the second floor with 12 steps to access  Patient lives witth her    Patient works full time owns a flower shop,  Patient denies any psychiatric diagnosis, no use of illicit drugs and/or alcohol use  Patient denies any legal problems  Patient;s PCP is Dr Shaquille Scott [facility] [id number] [place] [address]  Patient's  will assist with transport at d/c       CM reviewed d/c planning process including the following: identifying help at home, patient preference for d/c planning needs,  [facility] Meds to Bed program, availability of treatment team to discuss questions or concerns patient and/or family may have regarding understanding medications and recognizing signs and symptoms once discharged  CM also encouraged patient to follow up with all recommended appointments after discharge  Patient advised of importance for patient and family to participate in managing patients medical well being

## 2019-02-13 NOTE — CONSULTS
Consult - Cardiology   Richie Cosme 79 y o  female MRN: [id number]  Unit/Bed#: ED 14 Encounter: [id number]          Reason For Consult:  Abnormal ECG    History Of Present Illness: The patient is a 79 yr old WF followed by me for PAF and HTN  [place] She fractured her left ankle slipping on ice today    She is going to the OR  Lennox Kapur concern raised about an "abnormal ECG"  She denies CP or SOB  She has had recent palpitations somewhat increased from baseline  She denies lightheadedness or edema      Past Medical History:   PAF  HTN  Postablative hypothyroidism  GERD      Allergy:  No Known Allergies    Medications:  ASA 81 mg daily  Vit D3  Levothyroxine  nebivolol 5 mg daily  omeptrazole 20 mg daily    Family History:  Lung ca, CAD in mother    Social History:  Never smoked  No ETOH      ROS:  Frequent urination at night    Current ankle pain    Arthritis, back pain        Exam:  154/66  72  General: obese in mild distress from ankle pain  Head: Normocephalic, atraumatic  Eyes:   No Icterus  Normal Conjunctiva  Oropharynx: normal-appearing mucosa and no pharyngitis, no exudate  Neck: supple, symmetrical, trachea midline, thyroid: not enlarged, symmetric, no tenderness/mass/nodules, no carotid bruit and no JVD   Heart: RRR, No: murmer, rub or gallop,   Lungs: normal air entry, lungs clear to auscultation and no rales, rhonchi or wheezing  Abdomen: obese, normal findings: no masses palpable, no organomegaly and soft, non-tender  Lower Limbs: no edema RLE    Pulses ok      ECG: NSR Nonspecific T wave abnls    No change from July 2018  Trop normal  Wbc 11 28  Hb 14 7 plts 268  K 3 9 BUN 18 creat 0 72      ASSESSMENT AND PLAN:   1  Abnormal ECG    No change from baseline    No hx CAD or current sx to suggest ischemia  [place] for OR    Low risk    Patient active w/o cardiac sx  2  PAF  [place] Intermittent and moderately symptomatic  [place] No AC due to idiopathic GI that occurred on ASA in past  Does have elevated CHADS vasc 2 of 3  3   HTN-well controlled on nebivolol generally   Sapna Calvert Perhaps elevated due to pain    No adjustments at this time    Will see prn    Recommend continue beta blocker    Will see if issues arise (unlikely)    Madina Metzger MD

## 2019-02-13 NOTE — ANESTHESIA POSTPROCEDURE EVALUATION
Post-Op Assessment Note    CV Status:  Stable  Pain Score: 2    Pain management: adequate     Mental Status:  Alert and awake   Hydration Status:  Euvolemic   PONV Controlled:  Controlled   Airway Patency:  Patent   Post Op Vitals Reviewed: Yes      Staff: Anesthesiologist           BP      Temp      Pulse     Resp      SpO2

## 2019-02-13 NOTE — ED NOTES
Patients last PO intake was last night approx 1830 and this morning 0300 "just a sip of water with my thyroid meds"  Ortho PAC on phone requesting above info  Will call RN back with tentative OR time       Edna Woodson RN  02/13/19 4824

## 2019-02-14 ENCOUNTER — TELEPHONE (OUTPATIENT)
Dept: SURGERY | Facility: HOSPITAL | Age: 69
End: 2019-02-14

## 2019-02-14 ENCOUNTER — EPISODE CHANGES (OUTPATIENT)
Dept: CASE MANAGEMENT | Facility: HOSPITAL | Age: 69
End: 2019-02-14

## 2019-02-14 VITALS
HEART RATE: 65 BPM | TEMPERATURE: 98.1 F | WEIGHT: 223.77 LBS | SYSTOLIC BLOOD PRESSURE: 121 MMHG | OXYGEN SATURATION: 97 % | DIASTOLIC BLOOD PRESSURE: 71 MMHG | BODY MASS INDEX: 36.12 KG/M2 | RESPIRATION RATE: 18 BRPM

## 2019-02-14 PROBLEM — R94.31 PRE-OPERATIVE CARDIOVASCULAR EXAM, NEW EKG ABNORMALITIES C/W ISCHEMIA: Status: RESOLVED | Noted: 2019-02-13 | Resolved: 2019-02-14

## 2019-02-14 PROBLEM — Z01.810 PRE-OPERATIVE CARDIOVASCULAR EXAM, NEW EKG ABNORMALITIES C/W ISCHEMIA: Status: RESOLVED | Noted: 2019-02-13 | Resolved: 2019-02-14

## 2019-02-14 PROBLEM — I48.0 PAF (PAROXYSMAL ATRIAL FIBRILLATION) (HCC): Status: RESOLVED | Noted: 2018-07-09 | Resolved: 2019-02-14

## 2019-02-14 LAB
ANION GAP SERPL CALCULATED.3IONS-SCNC: 8 MMOL/L (ref 4–13)
BASOPHILS # BLD AUTO: 0.02 THOUSANDS/ΜL (ref 0–0.1)
BASOPHILS NFR BLD AUTO: 0 % (ref 0–1)
BUN SERPL-MCNC: 11 MG/DL (ref 5–25)
CALCIUM SERPL-MCNC: 7.4 MG/DL (ref 8.3–10.1)
CHLORIDE SERPL-SCNC: 109 MMOL/L (ref 100–108)
CO2 SERPL-SCNC: 26 MMOL/L (ref 21–32)
CREAT SERPL-MCNC: 0.71 MG/DL (ref 0.6–1.3)
EOSINOPHIL # BLD AUTO: 0.03 THOUSAND/ΜL (ref 0–0.61)
EOSINOPHIL NFR BLD AUTO: 0 % (ref 0–6)
ERYTHROCYTE [DISTWIDTH] IN BLOOD BY AUTOMATED COUNT: 13.2 % (ref 11.6–15.1)
GFR SERPL CREATININE-BSD FRML MDRD: 89 ML/MIN/1.73SQ M
GLUCOSE SERPL-MCNC: 106 MG/DL (ref 65–140)
HCT VFR BLD AUTO: 40.2 % (ref 34.8–46.1)
HGB BLD-MCNC: 12.9 G/DL (ref 11.5–15.4)
IMM GRANULOCYTES # BLD AUTO: 0.03 THOUSAND/UL (ref 0–0.2)
IMM GRANULOCYTES NFR BLD AUTO: 0 % (ref 0–2)
LYMPHOCYTES # BLD AUTO: 1.81 THOUSANDS/ΜL (ref 0.6–4.47)
LYMPHOCYTES NFR BLD AUTO: 18 % (ref 14–44)
MCH RBC QN AUTO: 30.6 PG (ref 26.8–34.3)
MCHC RBC AUTO-ENTMCNC: 32.1 G/DL (ref 31.4–37.4)
MCV RBC AUTO: 96 FL (ref 82–98)
MONOCYTES # BLD AUTO: 1.02 THOUSAND/ΜL (ref 0.17–1.22)
MONOCYTES NFR BLD AUTO: 10 % (ref 4–12)
NEUTROPHILS # BLD AUTO: 6.93 THOUSANDS/ΜL (ref 1.85–7.62)
NEUTS SEG NFR BLD AUTO: 72 % (ref 43–75)
NRBC BLD AUTO-RTO: 0 /100 WBCS
PLATELET # BLD AUTO: 249 THOUSANDS/UL (ref 149–390)
PMV BLD AUTO: 9.7 FL (ref 8.9–12.7)
POTASSIUM SERPL-SCNC: 3.7 MMOL/L (ref 3.5–5.3)
RBC # BLD AUTO: 4.21 MILLION/UL (ref 3.81–5.12)
SODIUM SERPL-SCNC: 143 MMOL/L (ref 136–145)
WBC # BLD AUTO: 9.84 THOUSAND/UL (ref 4.31–10.16)

## 2019-02-14 PROCEDURE — 97163 PT EVAL HIGH COMPLEX 45 MIN: CPT

## 2019-02-14 PROCEDURE — G8988 SELF CARE GOAL STATUS: HCPCS

## 2019-02-14 PROCEDURE — 80048 BASIC METABOLIC PNL TOTAL CA: CPT | Performed by: PHYSICIAN ASSISTANT

## 2019-02-14 PROCEDURE — G8979 MOBILITY GOAL STATUS: HCPCS

## 2019-02-14 PROCEDURE — 97166 OT EVAL MOD COMPLEX 45 MIN: CPT

## 2019-02-14 PROCEDURE — G8978 MOBILITY CURRENT STATUS: HCPCS

## 2019-02-14 PROCEDURE — 99024 POSTOP FOLLOW-UP VISIT: CPT | Performed by: ORTHOPAEDIC SURGERY

## 2019-02-14 PROCEDURE — G8987 SELF CARE CURRENT STATUS: HCPCS

## 2019-02-14 PROCEDURE — 99239 HOSP IP/OBS DSCHRG MGMT >30: CPT | Performed by: INTERNAL MEDICINE

## 2019-02-14 PROCEDURE — 85025 COMPLETE CBC W/AUTO DIFF WBC: CPT | Performed by: PHYSICIAN ASSISTANT

## 2019-02-14 RX ORDER — ASPIRIN 325 MG
325 TABLET, DELAYED RELEASE (ENTERIC COATED) ORAL 2 TIMES DAILY
Qty: 30 TABLET | Refills: 0 | Status: SHIPPED | OUTPATIENT
Start: 2019-02-14 | End: 2019-07-09 | Stop reason: ALTCHOICE

## 2019-02-14 RX ADMIN — VITAMIN D, TAB 1000IU (100/BT) 1000 UNITS: 25 TAB at 09:32

## 2019-02-14 RX ADMIN — PANTOPRAZOLE SODIUM 20 MG: 20 TABLET, DELAYED RELEASE ORAL at 06:11

## 2019-02-14 RX ADMIN — ENOXAPARIN SODIUM 40 MG: 40 INJECTION SUBCUTANEOUS at 09:32

## 2019-02-14 RX ADMIN — DOCUSATE SODIUM 100 MG: 100 CAPSULE, LIQUID FILLED ORAL at 09:32

## 2019-02-14 RX ADMIN — CEFAZOLIN SODIUM 2000 MG: 10 INJECTION, POWDER, FOR SOLUTION INTRAVENOUS at 03:50

## 2019-02-14 RX ADMIN — ASPIRIN 81 MG 81 MG: 81 TABLET ORAL at 09:32

## 2019-02-14 RX ADMIN — LEVOTHYROXINE SODIUM 100 MCG: 100 TABLET ORAL at 06:11

## 2019-02-14 RX ADMIN — NEBIVOLOL HYDROCHLORIDE 5 MG: 10 TABLET ORAL at 09:32

## 2019-02-14 NOTE — NURSING NOTE
Patient has reviewed all discharge instructions with  RN  Patient verbalizes importance of ASA therapy and incision care  Patient to make follow up with Dr Milena Page once home

## 2019-02-14 NOTE — PLAN OF CARE
Problem: PHYSICAL THERAPY ADULT  Goal: Performs mobility at highest level of function for planned discharge setting  See evaluation for individualized goals  Description  Treatment/Interventions: Functional transfer training, LE strengthening/ROM, Elevations, Therapeutic exercise, Endurance training, Patient/family training, Bed mobility, Gait training, Spoke to MD, OT, Spoke to nursing, Spoke to case management  Equipment Recommended: Wheelchair, Walker(per pt, she might be able to borrow a w/c from friends)       See flowsheet documentation for full assessment, interventions and recommendations  Note:   Prognosis: Good  Problem List: Decreased endurance, Impaired balance, Orthopedic restrictions, Decreased mobility, Decreased strength  Assessment: Pt  79 y  o female presented after slipping on ice  XR Left: Acute displaced distal fibular and medial malleolar fractures with evidence of ligamentous injury  Pt admitted for Closed fracture of medial malleolus of left tibia  S/p ORIF L ankle fx on 2/13/19  Pt referred to PT for mobility assessment & D/C planning w/ orders of up w/ assistance and NWB LLE  PTA, pt reports being I w/o AD  On eval, pt demonstrate minimal dec mobility, balance, endurance & amb 2* to LLE WBS  Pt require S for most functional mobility + cues for techniques  Gait deviations as above, slow w/ step-to/hopping pattern but no gross LOB noted  Pt able to maintain NWB to % of the time  No dizziness reported t/o session  Nsg staff most recent vital signs as follows: /71 (BP Location: Left arm)   Pulse 65   Temp 98 1 °F (36 7 °C) (Temporal)   Resp 18   Wt 101 kg (223 lb 12 3 oz)   SpO2 97%   BMI 36 12 kg/m²   At end of session, pt tolerated OOB in chair w/o issues, call bell & phone in reach  Fall precautions reinforced w/ good understanding  Will continue skilled PT to improve function & safety  At this time, will anticipate good progress in PT for safe D/C to home   Will recommend RW, w/c & inc family support at D/C  Per pt, she might be able to borrow a w/c from friends  CM to follow  Pt instructed on stair mgt, home safety & mgt w/ good understanding  Pt reports that she had been NWB to RLE 6-8yrs ago hence knows how to manage stairs w/ seated method  Pt state no concerns about going home today when medically cleared  Nsg staff to continue to mobilized pt (OOB in chair for all meals & ambulate in room/unit) as tolerated to prevent further decline in function  Nsg notified  Barriers to Discharge: Inaccessible home environment  Barriers to Discharge Comments: MARIELLE + full flight to 2nd level bed/bath  Recommendation: Home with family support     PT - OK to Discharge: Yes(when medically cleared)    See flowsheet documentation for full assessment

## 2019-02-14 NOTE — UTILIZATION REVIEW
Initial Clinical Review    Admission: Date/Time/Statement: 2/13/19 @ 0616   Orders Placed This Encounter   Procedures    Inpatient Admission (expected length of stay for this patient Order details is greater than two midnights)     Standing Status:   Standing     Number of Occurrences:   1     Order Specific Question:   Admitting Physician     Answer:   Angelita Anne [[id number]]     Order Specific Question:   Level of Care     Answer:   Med Surg [16]     Order Specific Question:   Estimated length of stay     Answer:   More than 2 Midnights     Order Specific Question:   Certification     Answer:   I certify that inpatient services are medically necessary for this patient for a duration of greater than two midnights  See H&P and MD Progress Notes for additional information about the patient's course of treatment  ED: Date/Time/Mode of Arrival:   ED Arrival Information     Expected Arrival Acuity Means of Arrival Escorted By Service Admission Type    - 2/13/2019 04:48 [facility] Orthopedic Surgery Urgent    Arrival Complaint    fall        Chief Complaint:   Chief Complaint   Patient presents with    Ankle Injury     Pt c/o left ankle pain and swelling after falling and twisting ankle about one hour PTA; Pt denies hitting her head and denies LOC     History of Illness: Netta Ludwig is a 79 y o  female who presents with c/o left ankle pain  Patient states she was leaving her house around 3:30a to go to work and slipped on the ice, twisted her left foot and fell on her buttocks  Denies any other symptoms, no numbness, tingling, chest pain, palpitations, shortness of breath, abdominal pain, loss of appetite NVCD or dysuria          ED Vital Signs:   ED Triage Vitals   Temperature Pulse Respirations Blood Pressure SpO2   02/13/19 0451 02/13/19 0451 02/13/19 0451 02/13/19 0451 02/13/19 0451   98 2 °F (36 8 °C) 70 16 166/82 97 %      Temp Source Heart Rate Source Patient Position - Orthostatic VS BP Location FiO2 (%)   02/13/19 0451 02/13/19 0451 02/13/19 0541 02/13/19 1933 --   Oral Monitor Lying Left arm       Pain Score       02/13/19 0451       6        Wt Readings from Last 1 Encounters:   02/13/19 101 kg (223 lb 12 3 oz)     Vital Signs (abnormal):     above    Pertinent Labs/Diagnostic Test Results:    WBC   11 28  Abs  neutro   9 10  X ray  L ankle:    Acute displaced distal fibular and medial malleolar fractures with evidence of ligamentous injury    ED Treatment:   Medication Administration from 02/13/2019 0448 to 02/13/2019 1011       Date/Time Order Dose Route Action Action by Comments     02/13/2019 0512 morphine (PF) 4 mg/mL injection 4 mg 4 mg Intravenous Given Valerie Better, RN      02/13/2019 0539 HYDROmorphone (DILAUDID) injection 2 mg 2 mg Intravenous Given Valerie Better, RN      02/13/2019 1011 nebivolol (BYSTOLIC) tablet 5 mg   Oral MAR Hold Automatic Transfer Provider         Past Medical/Surgical History:    Active Ambulatory Problems     Diagnosis Date Noted    PAF (paroxysmal atrial fibrillation) ([facility] [address] ) 07/09/2018    Benign essential HTN 07/09/2018    Postablative hypothyroidism 07/09/2018     Resolved Ambulatory Problems     Diagnosis Date Noted    No Resolved Ambulatory Problems     Past Medical History:   Diagnosis Date    Hypertension      Admitting Diagnosis: Ankle pain [M25 579]  Pre-operative cardiovascular exam, new EKG abnormalities c/w ischemia [Z01 810, R94 31]  Bimalleolar ankle fracture, left, closed, initial encounter [H43 463Y]  Age/Sex: 79 y o  female     Assessment/Plan: Closed fracture of medial malleolus of left tibia  Assessment & Plan  · XR Left: Acute displaced distal fibular and medial malleolar fractures with evidence of ligamentous injury  · Orthopedic consult  · NWB left leg  · Ice Pack  · Elevated extremity  · NPO for possible surgical intervention today  · Pain control     Fall from slipping on ice  Assessment & Plan  · See above plan for fracture  · PT OT consult     PAF (paroxysmal atrial fibrillation) ([facility] [address] )  Assessment & Plan  · As per Cardiology, improved since thyroid surgery  · On ASA 81mg for anticoagulation; had major GI bleed on ASA 466HI  · On Bystolic for rate control     Benign essential HTN  Assessment & Plan  · Maintained on Bystolic  Anticipated Length of Stay:  Patient will be admitted on an Inpatient basis with an anticipated length of stay of  > 2 midnights  Justification for Hospital Stay:  Fibula and malleolus fracture    SURGERY DATE: 2/13/2019  Preop Diagnosis:  Bimalleolar ankle fracture, left, closed, initial encounter [V74 159S]     Post-Op Diagnosis Codes:      * Bimalleolar ankle fracture, left, closed, initial encounter [S82 842A]     Procedure(s) (LRB):  OPEN REDUCTION W/ INTERNAL FIXATION (ORIF) ANKLE (Left)  Operative Indications:  Bimalleolar ankle fracture, left, closed, initial encounter [O30 842W]           Admission Orders:    IP   2/13  @    0616  Scheduled Meds:   Current Facility-Administered Medications:  acetaminophen 650 mg Oral Q6H PRN Sachaus Alcolu, PA-C    aspirin 81 mg Oral Daily Lexy Grande, CRNP    calcium carbonate 1,000 mg Oral Daily PRN Milus Alcolu, PA-C    cholecalciferol 1,000 Units Oral Daily Lexy Sorensenh, CRNP    docusate sodium 100 mg Oral BID Milus Lenin, PA-C    enoxaparin 40 mg Subcutaneous Daily Minh Mesa, PA-C    HYDROmorphone 0 5 mg Intravenous Q3H PRN Milus Alcolu, PA-C    levothyroxine 100 mcg Oral Daily Lexy Sorensenh, CRNP    nebivolol 5 mg Oral Daily Beatrizane Sholarry Mesa, PA-C    ondansetron 4 mg Intravenous Q6H PRN Lexy Sorensenh, CRNP    oxyCODONE 10 mg Oral Q3H PRN Milus Lenin, PA-C    oxyCODONE 5 mg Oral Q3H PRN Milus Lenin, PA-C    pantoprazole 20 mg Oral Early Morning Lexy Grande, CRNP    sodium chloride 75 mL/hr Intravenous Continuous Hugo Phelps PA-C Last Rate: Stopped (02/14/19 0501)     Continuous Infusions:   sodium chloride 75 mL/hr Last Rate: Stopped (02/14/19 0415)     PRN Meds:   acetaminophen    calcium carbonate    HYDROmorphone    ondansetron    oxyCODONE    oxyCODONE     Cons ortho  Cons cardiology  PT/OT  Neurovascular  checks  Q 4 hrs

## 2019-02-14 NOTE — DISCHARGE SUMMARY
Discharge Summary - [facility] Internal Medicine    Patient Information: Breezy Huynh 79 y o  female MRN: [id number]  Unit/Bed#: E2 -01 Encounter: [id number]    Discharging Physician / Practitioner: Kiya Galvin MD  PCP: Gumaro Hood MD  Admission Date: 2/13/2019  Discharge Date: 02/14/19    Reason for Admission:  Fall with bimalleolar left ankle fracture    Discharge Diagnoses:     Principal Problem:    Closed fracture of medial malleolus of left tibia  Active Problems:    PAF (paroxysmal atrial fibrillation) (HCC)    Benign essential HTN    Fall from slipping on ice    Bimalleolar ankle fracture, left, closed, initial encounter    Pre-operative cardiovascular exam, new EKG abnormalities c/w ischemia  Resolved Problems:    * No resolved hospital problems  *      Consultations During Hospital Stay:  · Cardiology orthopedic    Procedures Performed:     Xr Ankle 3+ Vw Left    Result Date: 2/13/2019  Narrative: C-ARM - INDICATION: wes  Procedure guidance  COMPARISON:  None TECHNIQUE: FLUOROSCOPY TIME:   1 minute 47 seconds 5 FLUOROSCOPIC IMAGES FINDINGS: Fluoroscopic guidance provided for surgical procedure  Osseous and soft tissue detail limited by technique  Impression: Fluoroscopic guidance provided for surgical procedure  Please refer to the separate procedure notes for additional details  Workstation performed: [id number]     Xr Ankle 3+ Views Left    Result Date: 2/13/2019  Narrative: LEFT ANKLE INDICATION:   injury  COMPARISON:  None VIEWS:  XR ANKLE 3+ VW LEFT FINDINGS: Acute oblique fracture distal fibular metadiaphysis extending to the tibiotalar joint  8 mm lateral and 6 mm posterior displacement of the distal fracture component  Acute transverse fracture medial malleolus at the level of the tibiotalar joint  3 mm lateral displacement of the distal fracture component  Widening of the medial tibiotalar space  Uncovering of the lateral talar dome  No dislocation   Postsurgical changes proximal 1st metatarsal  Diffuse osteopenia  Small plantar calcaneal spur  Moderate retrocalcaneal enthesopathy  Mild midfoot degenerative osteoarthritis  No lytic or blastic lesions seen  Moderate diffuse soft tissue swelling  Impression: Acute displaced distal fibular and medial malleolar fractures with evidence of ligamentous injury  The examination demonstrates a significant  finding and was documented as such in [facility] for liaison and referring practitioner notification  Workstation performed: [id number]         Significant Findings:     · Patient presented after falling on the ice in incurring a closed fracture of the medial malleolus his left tibia and bimalleolar ankle fracture  · She has a history of paroxysmal atrial fibrillation has been stable in sinus rhythm ever since she underwent completion therapy for her thyroid  · He was admitted to the medical service and orthopedic consult did we saw the patient on preoperative clearance and found the patient have the specific but T-wave inversions in leads 3 an APF prompting evaluation by Cardiology who felt in comparison to previous EKGs no evidence of any active ischemia nor history to suggest ischemia without prior coronary history patient's consider low risk for intervention  · She underwent an uncomplicated open reduction internal fixation of her left ankle and is discharged on postop day 1 to home with a rolling walker and nonweightbearing left lower extremity she did not want pursue home PT as she has helped gone through this before with her right leg  She is to maintain splint is applied and remain nonweightbearing left lower extremity  · Ortho is recommending aspirin 325 mg twice a day for 6 weeks as DVT prophylaxis however the patient has had major bleeding with and full anticoagulation in the past involving GI blood loss from the intestine    I have asked her to continue PPI therapy    Incidental Findings:   · None    Test Results Pending at Discharge (will require follow up): · None     Outpatient Tests Requested:  · None    Complications: The    Hospital Course:     Amaris Camp is a 79 y o  female patient who originally presented to the hospital on 2/13/2019 due to fall on ice injuring left ankle with resultant bimalleolar fracture  Please see above significant    Condition at Discharge: good     Discharge Day Visit / Exam:     * Please refer to separate progress for these details *    Discharge instructions/Information to patient and family:   See after visit summary for information provided to patient and family  Provisions for Follow-Up Care:  See after visit summary for information related to follow-up care and any pertinent home health orders  Disposition:     Home    For Discharges to [facility] SNF:   · Not Applicable to this Patient - Not Applicable to this Patient      Discharge Statement:  I spent 56 minutes discharging the patient  This time was spent on the day of discharge  I had direct contact with the patient on the day of discharge  Greater than 50% of the total time was spent examining patient, answering all patient questions, arranging and discussing plan of care with patient as well as directly providing post-discharge instructions  Additional time then spent on discharge activities  Discharge Medications:  See after visit summary for reconciled discharge medications provided to patient and family  ** Please Note: Dragon 360 Dictation voice to text software may have been used in the creation of this document   **

## 2019-02-14 NOTE — PLAN OF CARE
Problem: OCCUPATIONAL THERAPY ADULT  Goal: Performs self-care activities at highest level of function for planned discharge setting  See evaluation for individualized goals  Description  Treatment Interventions: ADL retraining, Functional transfer training, UE strengthening/ROM, Endurance training, Cognitive reorientation, Patient/family training, Equipment evaluation/education, Compensatory technique education, Energy conservation, Activityengagement(NWB L LE)          See flowsheet documentation for full assessment, interventions and recommendations  Note:   Limitation: Decreased ADL status, Decreased UE strength, Decreased Safe judgement during ADL, Decreased endurance, Decreased self-care trans, Decreased high-level ADLs  Prognosis: Good  Assessment: Pt is a 79 y o  female seen for OT evaluation s/p admit to [facility] on 2/13/2019 w/ Closed fracture of medial malleolus of left tibia, s/p ORIF L LE; NWB L LE  Comorbidities affecting pt's functional performance at time of assessment include: HTN, a-fib, PAF  Personal factors affecting pt at time of IE include: NWB L Le  Prior to admission, pt was living w/ spouse and reports independent w/ ADLs, independent w/ IADLs, independent w/ functional transfers and mobility w/ no AD, driving and working  Upon evaluation: Pt requires supervision <>stand w/ increased time, supervision functional mobility w/ RW and able to maintain NWB on L LE, MIN assist LB ADLs, supervision toileting,setup UB ADLs  2* the following deficits impacting occupational performance: decreased strength and endurance, NWB on L LE, pain, impaired balance  Pt to benefit from continued skilled OT tx while in the hospital to address deficits as defined above and maximize level of functional independence w ADL's and functional mobility   Occupational Performance areas to address include: grooming, bathing/shower, toilet hygiene, dressing, functional mobility, clothing management, cleaning and meal prep  From OT standpoint, recommendation at time of d/c would be home w/ family support        OT Discharge Recommendation: Home with family support  OT - OK to Discharge: Yes(when medically stable)

## 2019-02-14 NOTE — PHYSICAL THERAPY NOTE
PT EVALUATION    Pt  Name: Man Hammer  Pt  Age: 79 y o  MRN: [id number]  LENGTH OF STAY: 1    Patient Active Problem List   Diagnosis    PAF (paroxysmal atrial fibrillation) (HCC)    Benign essential HTN    Postablative hypothyroidism    Fall from slipping on ice    Closed fracture of medial malleolus of left tibia    Bimalleolar ankle fracture, left, closed, initial encounter    Pre-operative cardiovascular exam, new EKG abnormalities c/w ischemia       Admitting Diagnoses: Ankle pain [M25 579]  Pre-operative cardiovascular exam, new EKG abnormalities c/w ischemia [Z01 810, R94 31]  Bimalleolar ankle fracture, left, closed, initial encounter [E09 492T]    Past Medical History:   Diagnosis Date    Hypertension        Past Surgical History:   Procedure Laterality Date    BREAST LUMPECTOMY      ORIF TIBIA & FIBULA FRACTURES Left 2/13/2019    Procedure: OPEN REDUCTION W/ INTERNAL FIXATION (ORIF) ANKLE;  Surgeon: Keke Cabrera DO;  Location: [facility];  Service: Orthopedics    THYROIDECTOMY         Imaging Studies:  XR ankle 3+ vw left   Final Result by Lord Jose Luis MD (02/13 7619)      Fluoroscopic guidance provided for surgical procedure  Please refer to the separate procedure notes for additional details  Workstation performed: [id number]         XR ankle 3+ views LEFT   ED Interpretation by Noemi Simeon PA-C (12/40 4823)   bimalleolar fracture       Final Result by Domitila Rahman MD (02/13 7691)      Acute displaced distal fibular and medial malleolar fractures with evidence of ligamentous injury  The examination demonstrates a significant  finding and was documented as such in [facility] for liaison and referring practitioner notification                  Workstation performed: [id number]              02/14/19 0936   Note Type   Note type Eval only   Pain Assessment   Pain Score No Pain   Home Living   Type of [address] [facility] Two level;Stairs to enter with rails  (1+1STE)   [address]; Other (Comment)  (rollabout)   Prior Function   Level of [place] Independent with ADLs and functional mobility  (w/o AD)   Lives With Spouse   Receives Help From Family   ADL Assistance Independent   Falls in the last 6 months 1 to 4  (1x)   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)   Restrictions/Precautions   LLE [place] Per Order [employer]   Other Precautions Fall Risk;Pain;WBS   General   Family/Caregiver Present No   Cognition   Overall Cognitive Status WFL   Arousal/Participation Alert   Orientation Level Oriented X4   Following Commands Follows all commands and directions without difficulty   RUE Assessment   RUE Assessment   (refer to OT)   LUE Assessment   LUE Assessment   (refer to OT)   RLE Assessment   RLE Assessment WFL   Strength RLE   RLE Overall Strength 4+/5   LLE Assessment   LLE Assessment X   Strength LLE   LLE Overall Strength 3/5   L Ankle Dorsiflexion   (not tested)   L Ankle Plantar Flexion   (not tested)   Coordination   Movements are Fluid and Coordinated 1   Sensation WFL   Bed Mobility   Supine to Sit Unable to assess  (pt reports independence)   Additional Comments Pt sitting at EOB w/ OT upon my arrival; per OT, pt was already sitting at EOB upon her arrival; pt reports that she has been transferring bed<>BSC independently   Transfers   Sit to Stand 5  Supervision   Additional items Bedrails; Increased time required;Verbal cues   Stand to Sit 5  Supervision   Additional items Armrests; Increased time required;Verbal cues   Additional Comments cues for techniques; pt able to maintain NWB to % of the time   Ambulation/Elevation   Gait pattern Decreased foot clearance; Step to;Excessively slow  (hopping gait pattern; able to maintain NWB to % of th)   Gait Assistance 5  Supervision   Additional items Verbal cues   Assistive Device Rolling walker   Distance 20'x1   Stair Management Assistance (verbal instructions given for stair-seated method)   Balance   Static Sitting Good   Static Standing Fair +  (w/ RW)   Ambulatory Fair  (w/ RW)   Activity Tolerance   Activity Tolerance Patient tolerated treatment well   Medical Staff Made Aware RADHA Chang   Nurse Made Aware Anjel   Assessment   Prognosis Good   Problem List Decreased endurance; Impaired balance;Orthopedic restrictions;Decreased mobility; Decreased strength   Assessment Pt  79 y  o female presented after slipping on ice  XR Left: Acute displaced distal fibular and medial malleolar fractures with evidence of ligamentous injury  Pt admitted for Closed fracture of medial malleolus of left tibia  S/p ORIF L ankle fx on 2/13/19  Pt referred to PT for mobility assessment & D/C planning w/ orders of up w/ assistance and NWB LLE  PTA, pt reports being I w/o AD  On eval, pt demonstrate minimal dec mobility, balance, endurance & amb 2* to LLE WBS  Pt require S for most functional mobility + cues for techniques  Gait deviations as above, slow w/ step-to/hopping pattern but no gross LOB noted  Pt able to maintain NWB to % of the time  No dizziness reported t/o session  Nsg staff most recent vital signs as follows: /71 (BP Location: Left arm)   Pulse 65   Temp 98 1 °F (36 7 °C) (Temporal)   Resp 18   Wt 101 kg (223 lb 12 3 oz)   SpO2 97%   BMI 36 12 kg/m²   At end of session, pt tolerated OOB in chair w/o issues, call bell & phone in reach  Fall precautions reinforced w/ good understanding  Will continue skilled PT to improve function & safety  At this time, will anticipate good progress in PT for safe D/C to home  Will recommend RW, w/c & inc family support at D/C  Per pt, she might be able to borrow a w/c from friends  CM to follow  Pt instructed on stair mgt, home safety & mgt w/ good understanding  Pt reports that she had been NWB to RLE 6-8yrs ago hence knows how to manage stairs w/ seated method   Pt state no concerns about going home today when medically cleared  Nsg staff to continue to mobilized pt (OOB in chair for all meals & ambulate in room/unit) as tolerated to prevent further decline in function  Nsg notified  Barriers to Discharge Inaccessible home environment   Barriers to Discharge Comments MARIELLE + full flight to 2nd level bed/bath   Goals   Patient Goals go home today   STG Expiration Date 02/21/19   Short Term Goal #1 Goals to be met in 7 days; pt will be able to: 1) inc strength & balance by 1/2 grade to improve overall functional mobility & dec fall risk; 2) inc bed mobility to I for pt to be able to get in/OOB safely w/ proper techniques 100% of the time, to dec caregiver assistance & safely function at home; 3) inc transfers to modified I for pt to transition safely from one surface to another w/o % of the time, to dec caregiver assistance & safely function at home; 4) inc amb w/ RW approx  48' w/ modified I for pt to ambulate household distances w/o any % of the time, to dec caregiver assistance & safely function at home; 5) inc barthel score to 85 to decrease overall risk for falls; 6) negotiate stairs w/ modified I w/ appropriate method (most likely seated method) for inc safety during stair mgt inside/outside of home & dec caregiver assistance; 7) pt/caregiver ed   Treatment Day 0   Plan   Treatment/Interventions Functional transfer training;LE strengthening/ROM; Elevations; Therapeutic exercise; Endurance training;Patient/family training;Bed mobility;Gait training;Spoke to MD;OT;Spoke to nursing;Spoke to case management   PT Frequency Other (Comment);2-3x/wk   Recommendation   Recommendation Home with family support   Equipment Recommended Wheelchair;Walker  (per pt, she might be able to borrow a w/c from friends)   PT - OK to Discharge Yes  (when medically cleared)   Barthel Index   Feeding 10   Bathing 5   Grooming Score 5   Dressing Score 5   Bladder Score 10   Bowels Score 10   Toilet Use Score 10   Transfers (Bed/Chair) Score 10   Mobility (Level Surface) Score 0   Stairs Score 0   Barthel Index Score 65   Hx/personal factors: co-morbidities, inaccessible home, dec caregiver support, use of AD, pain, WB restrictions, h/o of falls, fall risk and assist w/ ADL's  Examination: dec mobility, dec balance, dec endurance, dec amb, moderate fall risk, pain, WB restrictions  Clinical: unpredictable (ongoing medical status, abnormal lab values, moderate fall risk and pain mgt)  Complexity: high     Watt Pih, PT

## 2019-02-14 NOTE — DISCHARGE INSTRUCTIONS
[facility]  Postoperative Instructions  Ankle/Distal Tibia Fracture  Open Reduction Internal Fixation      WOUND CARE:  Do not remove the dressing or splint  They will stay in place until your follow up appointment  Dressings are to be kept clean and dry  When showering please place a plastic bag around splint and secure it with tape or use a waterproof cover to keep splint dry  If purulent drainage (thick white, yellow or greenish in color) is coming from the wound, you notice any increasing redness around the wound, or you have a temperature greater than 101 degrees please let your doctor or the doctor on call know  WEIGHT BEARING:  You are not to bear any weight on the surgical leg  PAIN/SWELLING:  To help reduce swelling of the lower leg, remember to elevate the operative ankle/foot  Elevate for 30 minutes every 2 hours initially  Be sure to also use ice  Cover your splint with a thin towel or pillowcase  Put ice in a zip lock bag or towel and place over your surgical site  You can alternate for 20 minutes with the ice on and then 20 minutes with the ice off  Use narcotic pain medication as prescribed  Try to wean down as tolerated  These medications can cause constipation and you may want to use an over the counter stool softener  Nausea and vomiting can also be a side effect of narcotic medications  You have been provided a prescription for a medication for nausea and/or vomiting to be used as needed  If the narcotic you have been given does not contain Tylenol, you can use Tylenol alongside it  Avoid non-steroidal anti-inflammatory medications, such as Ibuprofen and Aleve as they can theoretically interfere with bone healing  If a refill of medication is needed, please call the office during regular business hours Monday through Friday  In general, refills will not be made after hours or on weekends so please plan ahead        BLOOD CLOT   please take Aspirin 325mg   PREVENTION: twice daily by mouth to prevent blood clots while you are less    active  DRIVING:   You are not permitted to drive until you follow up in the office  EXERCISE:  Gentle flexion and extension of toes is encouraged immediately  FOLLOW UP:  You should follow up in the office 10-14 days from the day of surgery         [facility] in [address] [place] [address] [address] [facility] , [address] [facility]   Delmi Lummi, [address] [facility]   [phone]

## 2019-02-14 NOTE — PROGRESS NOTES
Progress Note - Orthopedics   Cherelle Kee 79 y o  female MRN: [id number]  Unit/Bed#: E2 -01 Encounter: [id number]    Assessment:  73yo female s/p ORIF left ankle fracture POD1     Plan:  PT/OT- NWB LLE  Pain control pen  Med mgt per SLIM  Lovenox/SCDs for DVT prophylaxis-will need ASA 325mg bid x 6 weeks   Maintain splint  abx x 24hr      Subjective: Pt seen and examined today  She denies any pain  Denies CP, SOB  No complaints today     Vitals: Blood pressure 116/76, pulse 70, temperature 98 3 °F (36 8 °C), temperature source Tympanic, resp  rate 18, weight 101 kg (223 lb 12 3 oz), SpO2 97 %  ,Body mass index is 36 12 kg/m²  Intake/Output Summary (Last 24 hours) at 2/14/2019 0734  Last data filed at 2/14/2019 0617  Gross per 24 hour   Intake 3391 25 ml   Output 1550 ml   Net 1841 25 ml       Invasive Devices     Peripheral Intravenous Line            Peripheral IV 02/13/19 Right Antecubital 1 day                Ortho Exam:LLE  -Splint C/D/I  -sensation intact L4,L%,S1  +F/E of all toes  Brisk cap refill      Lab, Imaging and other studies:   I have personally reviewed pertinent lab results    CBC:   Lab Results   Component Value Date    WBC 9 84 02/14/2019    HGB 12 9 02/14/2019    HCT 40 2 02/14/2019    MCV 96 02/14/2019     02/14/2019    MCH 30 6 02/14/2019    MCHC 32 1 02/14/2019    RDW 13 2 02/14/2019    MPV 9 7 02/14/2019    NRBC 0 02/14/2019     CMP:   Lab Results   Component Value Date    SODIUM 143 02/14/2019     (H) 02/14/2019    CO2 26 02/14/2019    BUN 11 02/14/2019    CREATININE 0 71 02/14/2019    CALCIUM 7 4 (L) 02/14/2019    EGFR 89 02/14/2019

## 2019-02-14 NOTE — OCCUPATIONAL THERAPY NOTE
[facility]     Patient Name: Manny Koo  WJMJG'B Date: 2/14/2019  Problem List  Patient Active Problem List   Diagnosis    PAF (paroxysmal atrial fibrillation) (HCC)    Benign essential HTN    Postablative hypothyroidism    Fall from slipping on ice    Closed fracture of medial malleolus of left tibia    Bimalleolar ankle fracture, left, closed, initial encounter    Pre-operative cardiovascular exam, new EKG abnormalities c/w ischemia     Past Medical History  Past Medical History:   Diagnosis Date    Hypertension      Past Surgical History  Past Surgical History:   Procedure Laterality Date    BREAST LUMPECTOMY      ORIF TIBIA & FIBULA FRACTURES Left 2/13/2019    Procedure: OPEN REDUCTION W/ INTERNAL FIXATION (ORIF) ANKLE;  Surgeon: Donna Meehan DO;  [facility];  Service: Orthopedics    THYROIDECTOMY               02/14/19 0935   Note Type   Note type Eval/Treat   Restrictions/Precautions   Weight Bearing Precautions Per Order Yes   LLE Weight Bearing Per Order NWB   Other Precautions Fall Risk;Pain;WBS   Pain Assessment   Pain Assessment No/denies pain   Pain Score No Pain   Home Living   Type of Home House   Home Layout Two level;Stairs to enter with rails  (1+1 MARIELLE; flight to bed/bath; has BSC)   Bathroom Shower/Tub Tub/shower unit  (and walk in )   [address]; Shower chair  (has shower chairs was not using )   [address] [facility]  ([facility])   Additional Comments pt reports independent PTA   Prior Function   Level of [place] Independent with ADLs and functional mobility   Lives With Spouse   Receives Help From Family   ADL Assistance Independent   IADLs Independent   Falls in the last 6 months 1 to 4   Vocational Self employed   Comments pt driving PTA; spouse is retired and able to assist her at home   Lifestyle   Autonomy per pt independent w/ ADLs, independent w/ IADLs, independent w/ functional transfers and mobility w/ no AD, driving   Reciprocal Relationships spouse daughter   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/ work   ADL   Where Alondra Riley [address]mit[address] 5  [address] [facility] 5  [address] 5  [address] 4  [address] 5  [address] 4  [address]  5  Supervision/Setup   Bed Mobility   Additional Comments pt seated EOB upon arrival   Transfers   Sit to Stand 5  Supervision   Additional items Assist x 1; Increased time required;Verbal cues;Armrests   Stand to Sit 5  Supervision   Additional items Assist x 1; Increased time required;Verbal cues;Armrests   Additional Comments supervision increased time   Functional Mobility   Functional Mobility 5  Supervision   Additional Comments assist x1 w/ hopping on R LE while maitaining NWB on L LE   Additional items Rolling walker   Balance   Static Sitting Normal   Dynamic Sitting Good   Static Standing Fair   Dynamic Standing Fair -   Ambulatory Fair -   Activity Tolerance   Activity Tolerance Patient limited by fatigue   Nurse Made Aware approrpiate to see per Camryn SOLORIO Assessment   RUE Assessment WFL  (4/5)   LUE Assessment   LUE Assessment WFL  (4/5)   Hand Function   Gross Motor Coordination Functional   Fine Motor Coordination Functional   Sensation   Light Touch No apparent deficits   Sharp/Dull No apparent deficits   Proprioception   Proprioception No apparent deficits   Vision-Basic Assessment   Current Vision Wears glasses all the time   Vision - Complex Assessment   Ocular Range of Motion [facility]   Acuity Able to read clock/calendar on wall without difficulty   Perception   Inattention/Neglect Appears intact   Cognition   Overall Cognitive Status [facility]   Arousal/Participation Alert; Cooperative Attention Within functional limits   Orientation Level Oriented X4   Memory Within functional limits   Following Commands Follows one step commands without difficulty   Comments pt engages in appropriate conversations   Assessment   Limitation Decreased ADL status; Decreased UE strength;Decreased Safe judgement during ADL;Decreased endurance;Decreased self-care trans;Decreased high-level ADLs   Prognosis Good   Assessment Pt is a 79 y o  female seen for OT evaluation s/p admit to [facility] on 2/13/2019 w/ Closed fracture of medial malleolus of left tibia, s/p ORIF L LE; NWB L LE  Comorbidities affecting pt's functional performance at time of assessment include: HTN, a-fib, PAF  Personal factors affecting pt at time of IE include: NWB L Le  Prior to admission, pt was living w/ spouse and reports independent w/ ADLs, independent w/ IADLs, independent w/ functional transfers and mobility w/ no AD, driving and working  Upon evaluation: Pt requires supervision <>stand w/ increased time, supervision functional mobility w/ RW and able to maintain NWB on L LE, MIN assist LB ADLs, supervision toileting,setup UB ADLs  2* the following deficits impacting occupational performance: decreased strength and endurance, NWB on L LE, pain, impaired balance  Pt to benefit from continued skilled OT tx while in the hospital to address deficits as defined above and maximize level of functional independence w ADL's and functional mobility  Occupational Performance areas to address include: grooming, bathing/shower, toilet hygiene, dressing, functional mobility, clothing management, cleaning and meal prep  From OT standpoint, recommendation at time of d/c would be home w/ family support  Goals   Patient Goals "go home today"   STG Time Frame 3-5   Short Term Goal  please see below goals   Plan   Treatment Interventions ADL retraining;Functional transfer training;UE strengthening/ROM; Endurance training;Cognitive reorientation;Patient/family training;Equipment evaluation/education; Compensatory technique education; Energy conservation; Activityengagement  (NWB L LE)   Goal Expiration Date 02/19/19   OT Frequency 2-3x/wk   Recommendation   OT Discharge Recommendation Home with family support   OT - OK to Discharge Yes  (when medically stable)   Barthel Index   Feeding 10   Bathing 5   Grooming Score 5   Dressing Score 5   Bladder Score 10   Bowels Score 10   Toilet Use Score 10   Transfers (Bed/Chair) Score 10   Mobility (Level Surface) Score 0   Stairs Score 0   Barthel Index Score 65   Modified [place] Scale   Modified [place] Scale 3     Occupational Therapy Goals to be met in 7-10 days:  1) Pt will improve activity tolerance to G for min 30 min txment sessions to enhance ADLs  2) Pt will complete ADLs/self care w/ MOD I  3) Pt will complete toileting w/ mod I w/ G hygiene/thoroughness using DME PRN  4) Pt will improve functional transfers on/off all surfaces using DME PRN w/ G balance/safety including toileting w/ mod I while maintaining NWB L LE  5) Pt will improve fx'l mobility during I/ADl/leisure tasks using DME PRN w/ g balance/safety w/ mod I  6) Pt will engage in ongoing cognitive assessment w/ G participation to A w/ safe d/c planning/recommendations  7) Pt will demonstrate G carryover of pt/caregiver education and training as appropriate w/ mod I  w/ G tolerance  8) Pt will engage in depression screen/leisure interest checklist w/ G participation to monitor s/s depression and ID 3 positive coping strategies to A w/ emotional regulation and management  9) Pt will demonstrate 100% carryover of E C  techniques w/ mod I t/o fx'l I/ADL/leisure tasks w/o cues s/p skilled education  10) Pt will demonstrate improved standing tolerance to 3-5 minutes during functional tasks w/ no LOB to enhance ADL performance while maintaining NWB on L LE    Documentation completed by: Lynda Pulido MS, OTR/L

## 2019-02-15 NOTE — PHYSICIAN ADVISOR
Current patient class: Inpatient  The patient is currently on Hospital Day: 2 at [facility]      The patient was admitted to the hospital at [id number] on 2/13/19 for the following diagnosis:  Ankle pain [M25 [id number]]  Pre-operative cardiovascular exam, new EKG abnormalities c/w ischemia [Z01 810, R94 31]  Bimalleolar ankle fracture, left, closed, initial encounter [S82 312A]       There is documentation in the medical record of an expected length of stay of at least 2 midnights  The patient is therefore expected to satisfy the 2 midnight benchmark and given the 2 midnight presumption is appropriate for INPATIENT ADMISSION  Given this expectation of a satisfying stay, CMS instructs us that the patient is most often appropriate for inpatient admission under part A provided medical necessity is documented in the chart  After review of the relevant documentation, labs, vital signs and test results, the patient is appropriate for INPATIENT ADMISSION  Admission to the hospital as an inpatient is a complex decision making process which requires the practitioner to consider the patients presenting complaint, history and physical examination and all relevant testing  With this in mind, in this case, the patient was deemed appropriate for INPATIENT ADMISSION  After review of the documentation and testing available at the time of the admission I concur with this clinical determination of medical necessity  Rationale is as follows: The patient is a 79 yrs old Female who presented to the ED at 2/13/2019  4:49 AM with a chief complaint of Ankle Injury (Pt c/o left ankle pain and swelling after falling and twisting ankle about one hour PTA; Pt denies hitting her head and denies LOC)     Given the need for further hospitalization, and along with the documentation of medical necessity present in the chart, the patient is appropriate for inpatient admission    The patient is expected to satisfy the 2 midnight benchmark, and will require further acute medical care  The patient does have comorbid conditions which increases the risk for significant adverse outcome  Given this the patient is appropriate for inpatient admission  The patients vitals on arrival were ED Triage Vitals   Temperature Pulse Respirations Blood Pressure SpO2   02/13/19 0451 02/13/19 0451 02/13/19 0451 02/13/19 0451 02/13/19 0451   98 2 °F (36 8 °C) 70 16 166/82 97 %      Temp Source Heart Rate Source Patient Position - Orthostatic VS BP Location FiO2 (%)   02/13/19 0451 02/13/19 0451 02/13/19 0541 02/13/19 1933 --   Oral Monitor Lying Left arm       Pain Score       02/13/19 0451       6           Past Medical History:   Diagnosis Date    Hypertension      Past Surgical History:   Procedure Laterality Date    BREAST LUMPECTOMY      ORIF TIBIA & FIBULA FRACTURES Left 2/13/2019    Procedure: OPEN REDUCTION W/ INTERNAL FIXATION (ORIF) ANKLE;  Surgeon: Ryder Damon DO;  [facility];  Service: Orthopedics    THYROIDECTOMY             Consults have been placed to:   IP CONSULT TO ORTHOPEDIC SURGERY  IP CONSULT TO CARDIOLOGY    Vitals:    02/13/19 1933 02/14/19 0016 02/14/19 0300 02/14/19 0732   BP: 116/68 108/57 116/76 121/71   BP Location: Left arm Left arm Left arm Left arm   Pulse: 83 66 70 65   Resp: 18 18 18 18   Temp: 98 1 °F (36 7 °C) 97 5 °F (36 4 °C) 98 3 °F (36 8 °C) 98 1 °F (36 7 °C)   TempSrc: Temporal Tympanic Tympanic Temporal   SpO2: 95% 94% 97% 97%   Weight:           Most recent labs:    Recent Labs     02/13/19  0934 02/14/19  0554   WBC  --  9 84   HGB  --  12 9   HCT  --  40 2   PLT  --  249   K  --  3 7   CALCIUM  --  7 4*   BUN  --  11   CREATININE  --  0 71   TROPONINI <0 02  --        Scheduled Meds:  Continuous Infusions:  No current facility-administered medications for this encounter  PRN Meds:      Surgical procedures (if appropriate):  Procedure(s):  OPEN REDUCTION W/ INTERNAL FIXATION (ORIF) ANKLE

## 2019-02-18 ENCOUNTER — PATIENT OUTREACH (OUTPATIENT)
Dept: CASE MANAGEMENT | Facility: OTHER | Age: 69
End: 2019-02-18

## 2019-02-18 NOTE — PROGRESS NOTES
CM called and spoke with patient ,explained outpt CM role with introduction  Patient agreeable to follow up  CM reviewed discharge instructions and medication list with patient  Patient has f/u appt with ortho on 2/28  Patient stated "I"m doing great", NWB to LLE, using knee walker, and wheelchair for mobility  Patient has splint with cotton and ace bandage to LLE, with instructions not to get it wet  Patient denies having any pain, unless in dependent position  Patient resting with LLE elevated  Patient stated  is retired, is home assisting her  CM made patient aware CM will follow up in 2 weeks  CM offered contact information, patient stated she could retrieve from her phone  CM mailed business card and bundle paperwork to patient, encouraged patient to call CM with any questions or concerns

## 2019-02-26 ENCOUNTER — TELEPHONE (OUTPATIENT)
Dept: OBGYN CLINIC | Facility: MEDICAL CENTER | Age: 69
End: 2019-02-26

## 2019-02-28 ENCOUNTER — OFFICE VISIT (OUTPATIENT)
Dept: OBGYN CLINIC | Facility: MEDICAL CENTER | Age: 69
End: 2019-02-28

## 2019-02-28 ENCOUNTER — APPOINTMENT (OUTPATIENT)
Dept: RADIOLOGY | Facility: CLINIC | Age: 69
End: 2019-02-28
Payer: MEDICARE

## 2019-02-28 VITALS
HEIGHT: 66 IN | SYSTOLIC BLOOD PRESSURE: 147 MMHG | HEART RATE: 72 BPM | DIASTOLIC BLOOD PRESSURE: 87 MMHG | WEIGHT: 208 LBS | BODY MASS INDEX: 33.43 KG/M2

## 2019-02-28 DIAGNOSIS — S82.55XD CLOSED NONDISPLACED FRACTURE OF MEDIAL MALLEOLUS OF LEFT TIBIA WITH ROUTINE HEALING, SUBSEQUENT ENCOUNTER: ICD-10-CM

## 2019-02-28 DIAGNOSIS — S82.55XD CLOSED NONDISPLACED FRACTURE OF MEDIAL MALLEOLUS OF LEFT TIBIA WITH ROUTINE HEALING, SUBSEQUENT ENCOUNTER: Primary | ICD-10-CM

## 2019-02-28 PROCEDURE — 73610 X-RAY EXAM OF ANKLE: CPT

## 2019-02-28 PROCEDURE — 99024 POSTOP FOLLOW-UP VISIT: CPT | Performed by: ORTHOPAEDIC SURGERY

## 2019-02-28 NOTE — PROGRESS NOTES
Assessment/Plan:  1  Closed nondisplaced fracture of medial malleolus of left tibia with routine healing, subsequent encounter      Orders Placed This Encounter   Procedures    XR ankle 3+ vw left       Transition to outpatient PT  Pain control prn- oxycodone and tylenol, patient aware to wean away from narcotics  Continue with *** for DVT prophylaxis  ARIES stockings as needed for swelling  May shower and let water run over incision, do not submerge incision  Patient aware to call ahead for abx prior to dental appts  No follow-ups on file  I answered all of the patient's questions during the visit and provided education of the patient's condition during the visit  The patient verbalized understanding of the information given and agrees with the plan  This note was dictated using [email or web address] software  It may contain errors including improperly dictated words  Please contact physician directly for any questions  Subjective   Chief Complaint:   Chief Complaint   Patient presents with    Left Ankle - Omar is a 79 y o  female who presents for {NUMBERS:32975} week  follow up s/p {right/left/bilateral:20006} TKA  Review of Systems  ROS:    See HPI for musculoskeletal review     All other systems reviewed are negative     History:  Past Medical History:   Diagnosis Date    Hypertension      Past Surgical History:   Procedure Laterality Date    BREAST LUMPECTOMY      ORIF TIBIA & FIBULA FRACTURES Left 2/13/2019    Procedure: OPEN REDUCTION W/ INTERNAL FIXATION (ORIF) ANKLE;  Surgeon: Lorraine Arellano DO;  Location: [facility] OR;  Service: Orthopedics    THYROIDECTOMY       Social History   Social History     Substance and Sexual Activity   Alcohol Use No     Social History     Substance and Sexual Activity   Drug Use No     Social History     Tobacco Use   Smoking Status Passive Smoke Exposure - Never Smoker   Smokeless Tobacco Never Used     Family History:   Family History Problem Relation Age of Onset    Lung cancer Family     Coronary artery disease Family        Current Outpatient Medications on File Prior to Visit   Medication Sig Dispense Refill    aspirin (ECOTRIN) 325 mg EC tablet Take 1 tablet (325 mg total) by mouth 2 (two) times a day for 40 days 30 tablet 0    Cholecalciferol (TH VITAMIN D3) 2000 units CAPS Take 2,000 Units by mouth daily       levothyroxine 100 mcg tablet Take 100 mcg by mouth daily  2    nebivolol (BYSTOLIC) 5 mg tablet Take 1 tablet (5 mg total) by mouth daily 90 tablet 3    omeprazole (PriLOSEC) 20 mg delayed release capsule Take 20 mg by mouth daily        No current facility-administered medications on file prior to visit        No Known Allergies     Objective     /87   Pulse 72   Ht 5' 6" (1 676 m)   Wt 94 3 kg (208 lb)   BMI 33 57 kg/m²      PE:  AAOx 3  WDWN  Hearing intact, no drainage from eyes  no audible wheezing  no abdominal distension  LE compartments soft, AT/GS intact    Ortho Exam:  {right/left/bilateral:58546} Ankle:   INC: C/D/I, No erythema, mild swelling      Imaging Studies: {Results Review Statement:14747}  XR {right/left/bilateral:43360} knee:  S/p TKA, adequate alignment

## 2019-02-28 NOTE — PROGRESS NOTES
Assessment/Plan:  1  Closed nondisplaced fracture of medial malleolus of left tibia with routine healing, subsequent encounter      Orders Placed This Encounter   Procedures    XR ankle 3+ vw left     NON Weight bearing   Cam walking boot at all times  May stop wearing at night in 2 weeks   Transition to outpatient PT  Pain control prn- tylenol  Continue with  BID for DVT prophylaxis  May shower and let water run over incision, do not submerge incision  Ice and elevation  When resting   May start  In 2 weeks gentle ROM exercises  Return in about 1 month (around 3/28/2019) for PO LEft ankle  ORIF   I answered all of the patient's questions during the visit and provided education of the patient's condition during the visit  The patient verbalized understanding of the information given and agrees with the plan  This note was dictated using [employer] software  It may contain errors including improperly dictated words  Please contact physician directly for any questions  Subjective   Chief Complaint:   Chief Complaint   Patient presents with    Left Ankle - Omar is a 79 y o  female who presents for 2 week  follow up s/p left ankle ORIF on 2/13/2019  Patient states she is doing good from the surgery  She is ambulating with a scooter outside of house and walker at home  She has stopped taking pain medications and is taking Tylenol prn for pain  Review of Systems  ROS:    See HPI for musculoskeletal review     All other systems reviewed are negative     History:  Past Medical History:   Diagnosis Date    Hypertension      Past Surgical History:   Procedure Laterality Date    BREAST LUMPECTOMY      ORIF TIBIA & FIBULA FRACTURES Left 2/13/2019    Procedure: OPEN REDUCTION W/ INTERNAL FIXATION (ORIF) ANKLE;  Surgeon: Toyin Poe DO;  [facility];  Service: Orthopedics    THYROIDECTOMY       Social History   Social History     Substance and Sexual Activity Alcohol Use No     Social History     Substance and Sexual Activity   Drug Use No     Social History     Tobacco Use   Smoking Status Passive Smoke Exposure - Never Smoker   Smokeless Tobacco Never Used     Family History:   Family History   Problem Relation Age of Onset    Lung cancer Family     Coronary artery disease Family        Current Outpatient Medications on File Prior to Visit   Medication Sig Dispense Refill    aspirin (ECOTRIN) 325 mg EC tablet Take 1 tablet (325 mg total) by mouth 2 (two) times a day for 40 days 30 tablet 0    Cholecalciferol (TH VITAMIN D3) 2000 units CAPS Take 2,000 Units by mouth daily       levothyroxine 100 mcg tablet Take 100 mcg by mouth daily  2    nebivolol (BYSTOLIC) 5 mg tablet Take 1 tablet (5 mg total) by mouth daily 90 tablet 3    omeprazole (PriLOSEC) 20 mg delayed release capsule Take 20 mg by mouth daily        No current facility-administered medications on file prior to visit        No Known Allergies     Objective     /87   Pulse 72   Ht 5' 6" (1 676 m)   Wt 94 3 kg (208 lb)   BMI 33 57 kg/m²      PE:  AAOx 3  WDWN  Hearing intact, no drainage from eyes  no audible wheezing  no abdominal distension  LE compartments soft, AT/GS intact    Ortho Exam:  left ankle   INCs: C/D/I, No erythema, mild swelling    Imaging Studies: I have personally reviewed pertinent films in PACS  XR left ankle:  S/p ORIF, adequate alignment of hardware, fracture alignment maintained    Scribe Attestation    I,:   Armand Soto am acting as a scribe while in the presence of the attending physician :        I,:   Dandre Hinds DO personally performed the services described in this documentation    as scribed in my presence :

## 2019-03-28 ENCOUNTER — OFFICE VISIT (OUTPATIENT)
Dept: OBGYN CLINIC | Facility: MEDICAL CENTER | Age: 69
End: 2019-03-28

## 2019-03-28 ENCOUNTER — APPOINTMENT (OUTPATIENT)
Dept: RADIOLOGY | Facility: CLINIC | Age: 69
End: 2019-03-28
Payer: MEDICARE

## 2019-03-28 VITALS
HEIGHT: 66 IN | SYSTOLIC BLOOD PRESSURE: 130 MMHG | BODY MASS INDEX: 33.35 KG/M2 | HEART RATE: 67 BPM | DIASTOLIC BLOOD PRESSURE: 86 MMHG | WEIGHT: 207.5 LBS

## 2019-03-28 DIAGNOSIS — S82.55XD CLOSED NONDISPLACED FRACTURE OF MEDIAL MALLEOLUS OF LEFT TIBIA WITH ROUTINE HEALING, SUBSEQUENT ENCOUNTER: ICD-10-CM

## 2019-03-28 DIAGNOSIS — S82.55XD CLOSED NONDISPLACED FRACTURE OF MEDIAL MALLEOLUS OF LEFT TIBIA WITH ROUTINE HEALING, SUBSEQUENT ENCOUNTER: Primary | ICD-10-CM

## 2019-03-28 PROCEDURE — 99024 POSTOP FOLLOW-UP VISIT: CPT | Performed by: ORTHOPAEDIC SURGERY

## 2019-03-28 PROCEDURE — 73610 X-RAY EXAM OF ANKLE: CPT

## 2019-03-28 NOTE — PROGRESS NOTES
Assessment/Plan:  1  Closed nondisplaced fracture of medial malleolus of left tibia with routine healing, subsequent encounter      Orders Placed This Encounter   Procedures    XR ankle 3+ vw left    Ambulatory referral to Physical Therapy       Pt given Rx to start PT  Pain control prn- OTC pain medication as needed for pain  WBAT in camboot only  x 2 weeks  May remove boot to sleep  May transition to ankle brace in two weeks as tolerated     Return in about 1 month (around 4/25/2019)  I answered all of the patient's questions during the visit and provided education of the patient's condition during the visit  The patient verbalized understanding of the information given and agrees with the plan  This note was dictated using [employer] software  It may contain errors including improperly dictated words  Please contact physician directly for any questions  Subjective   Chief Complaint:   Chief Complaint   Patient presents with    Left Ankle - Post-op, Follow-up       Mandy Dent is a 79 y o  female who presents for 6 week follow up s/p left ankle ORIF on 2/13/2019  Patient states she is doing better over all  She has been taking her foot out of the cam boot and had been doing gentle ROM exercises for the ankle  She is ambulating with a scooter outside of the house and walker inside the house  She is currently on  for DVT prophylaxis and is not taking any pain medications  Review of Systems  ROS:    See HPI for musculoskeletal review     All other systems reviewed are negative     History:  Past Medical History:   Diagnosis Date    Hypertension      Past Surgical History:   Procedure Laterality Date    BREAST LUMPECTOMY      ORIF TIBIA & FIBULA FRACTURES Left 2/13/2019    Procedure: OPEN REDUCTION W/ INTERNAL FIXATION (ORIF) ANKLE;  Surgeon: Marquita Burns DO;  Location: [facility];  Service: Orthopedics    THYROIDECTOMY       Social History   Social History     Substance and Sexual Activity   Alcohol Use No     Social History     Substance and Sexual Activity   Drug Use No     Social History     Tobacco Use   Smoking Status Passive Smoke Exposure - Never Smoker   Smokeless Tobacco Never Used     Family History:   Family History   Problem Relation Age of Onset    Lung cancer Family     Coronary artery disease Family        Current Outpatient Medications on File Prior to Visit   Medication Sig Dispense Refill    aspirin (ECOTRIN) 325 mg EC tablet Take 1 tablet (325 mg total) by mouth 2 (two) times a day for 40 days 30 tablet 0    Cholecalciferol (TH VITAMIN D3) 2000 units CAPS Take 2,000 Units by mouth daily       levothyroxine 100 mcg tablet Take 100 mcg by mouth daily  2    nebivolol (BYSTOLIC) 5 mg tablet Take 1 tablet (5 mg total) by mouth daily 90 tablet 3    omeprazole (PriLOSEC) 20 mg delayed release capsule Take 20 mg by mouth daily        No current facility-administered medications on file prior to visit  No Known Allergies     Objective     /86   Pulse 67   Ht 5' 6" (1 676 m)   Wt 94 1 kg (207 lb 8 oz)   BMI 33 49 kg/m²      PE:  AAOx 3  WDWN  Hearing intact, no drainage from eyes  no audible wheezing  no abdominal distension  LE compartments soft, AT/GS intact    Ortho Exam:  left ankle   INC: healed, No erythema, mild swelling  AROM:limited   Sensation L4, L5, S1   Palpable pedal pulse      I have personally reviewed pertinent films in PACS and my interpretation is x-ray right- healing medial and lateral malleolus fracture trauma stable appearance of hardware    Scribe Attestation    I,:    am acting as a scribe while in the presence of the attending physician :        I,:    personally performed the services described in this documentation    as scribed in my presence :

## 2019-04-01 ENCOUNTER — EVALUATION (OUTPATIENT)
Dept: PHYSICAL THERAPY | Facility: CLINIC | Age: 69
End: 2019-04-01
Payer: MEDICARE

## 2019-04-01 DIAGNOSIS — S82.55XD CLOSED NONDISPLACED FRACTURE OF MEDIAL MALLEOLUS OF LEFT TIBIA WITH ROUTINE HEALING, SUBSEQUENT ENCOUNTER: Primary | ICD-10-CM

## 2019-04-01 PROCEDURE — 97110 THERAPEUTIC EXERCISES: CPT | Performed by: PHYSICAL THERAPIST

## 2019-04-01 PROCEDURE — 97162 PT EVAL MOD COMPLEX 30 MIN: CPT | Performed by: PHYSICAL THERAPIST

## 2019-04-05 ENCOUNTER — OFFICE VISIT (OUTPATIENT)
Dept: PHYSICAL THERAPY | Facility: CLINIC | Age: 69
End: 2019-04-05
Payer: MEDICARE

## 2019-04-05 DIAGNOSIS — S82.55XD CLOSED NONDISPLACED FRACTURE OF MEDIAL MALLEOLUS OF LEFT TIBIA WITH ROUTINE HEALING, SUBSEQUENT ENCOUNTER: Primary | ICD-10-CM

## 2019-04-05 PROCEDURE — 97140 MANUAL THERAPY 1/> REGIONS: CPT | Performed by: PHYSICAL THERAPIST

## 2019-04-05 PROCEDURE — 97110 THERAPEUTIC EXERCISES: CPT | Performed by: PHYSICAL THERAPIST

## 2019-04-05 PROCEDURE — 97112 NEUROMUSCULAR REEDUCATION: CPT | Performed by: PHYSICAL THERAPIST

## 2019-04-09 ENCOUNTER — PATIENT OUTREACH (OUTPATIENT)
Dept: CASE MANAGEMENT | Facility: OTHER | Age: 69
End: 2019-04-09

## 2019-04-10 ENCOUNTER — OFFICE VISIT (OUTPATIENT)
Dept: PHYSICAL THERAPY | Facility: CLINIC | Age: 69
End: 2019-04-10
Payer: MEDICARE

## 2019-04-10 DIAGNOSIS — S82.55XD CLOSED NONDISPLACED FRACTURE OF MEDIAL MALLEOLUS OF LEFT TIBIA WITH ROUTINE HEALING, SUBSEQUENT ENCOUNTER: Primary | ICD-10-CM

## 2019-04-10 PROCEDURE — 97140 MANUAL THERAPY 1/> REGIONS: CPT

## 2019-04-10 PROCEDURE — 97110 THERAPEUTIC EXERCISES: CPT

## 2019-04-12 ENCOUNTER — OFFICE VISIT (OUTPATIENT)
Dept: PHYSICAL THERAPY | Facility: CLINIC | Age: 69
End: 2019-04-12
Payer: MEDICARE

## 2019-04-12 DIAGNOSIS — S82.55XD CLOSED NONDISPLACED FRACTURE OF MEDIAL MALLEOLUS OF LEFT TIBIA WITH ROUTINE HEALING, SUBSEQUENT ENCOUNTER: Primary | ICD-10-CM

## 2019-04-12 PROCEDURE — 97140 MANUAL THERAPY 1/> REGIONS: CPT | Performed by: PHYSICAL THERAPIST

## 2019-04-12 PROCEDURE — 97110 THERAPEUTIC EXERCISES: CPT | Performed by: PHYSICAL THERAPIST

## 2019-04-17 ENCOUNTER — OFFICE VISIT (OUTPATIENT)
Dept: PHYSICAL THERAPY | Facility: CLINIC | Age: 69
End: 2019-04-17
Payer: MEDICARE

## 2019-04-17 DIAGNOSIS — S82.55XD CLOSED NONDISPLACED FRACTURE OF MEDIAL MALLEOLUS OF LEFT TIBIA WITH ROUTINE HEALING, SUBSEQUENT ENCOUNTER: Primary | ICD-10-CM

## 2019-04-17 PROCEDURE — 97140 MANUAL THERAPY 1/> REGIONS: CPT

## 2019-04-17 PROCEDURE — 97116 GAIT TRAINING THERAPY: CPT

## 2019-04-17 PROCEDURE — 97110 THERAPEUTIC EXERCISES: CPT

## 2019-04-19 ENCOUNTER — OFFICE VISIT (OUTPATIENT)
Dept: PHYSICAL THERAPY | Facility: CLINIC | Age: 69
End: 2019-04-19
Payer: MEDICARE

## 2019-04-19 DIAGNOSIS — S82.55XD CLOSED NONDISPLACED FRACTURE OF MEDIAL MALLEOLUS OF LEFT TIBIA WITH ROUTINE HEALING, SUBSEQUENT ENCOUNTER: Primary | ICD-10-CM

## 2019-04-19 PROCEDURE — 97140 MANUAL THERAPY 1/> REGIONS: CPT | Performed by: PHYSICAL THERAPIST

## 2019-04-19 PROCEDURE — 97110 THERAPEUTIC EXERCISES: CPT | Performed by: PHYSICAL THERAPIST

## 2019-04-19 PROCEDURE — 97112 NEUROMUSCULAR REEDUCATION: CPT | Performed by: PHYSICAL THERAPIST

## 2019-04-24 ENCOUNTER — OFFICE VISIT (OUTPATIENT)
Dept: PHYSICAL THERAPY | Facility: CLINIC | Age: 69
End: 2019-04-24
Payer: MEDICARE

## 2019-04-24 DIAGNOSIS — S82.55XD CLOSED NONDISPLACED FRACTURE OF MEDIAL MALLEOLUS OF LEFT TIBIA WITH ROUTINE HEALING, SUBSEQUENT ENCOUNTER: Primary | ICD-10-CM

## 2019-04-24 PROCEDURE — 97112 NEUROMUSCULAR REEDUCATION: CPT

## 2019-04-24 PROCEDURE — 97140 MANUAL THERAPY 1/> REGIONS: CPT

## 2019-04-24 PROCEDURE — 97116 GAIT TRAINING THERAPY: CPT

## 2019-04-24 PROCEDURE — 97110 THERAPEUTIC EXERCISES: CPT

## 2019-04-25 ENCOUNTER — OFFICE VISIT (OUTPATIENT)
Dept: OBGYN CLINIC | Facility: MEDICAL CENTER | Age: 69
End: 2019-04-25

## 2019-04-25 ENCOUNTER — APPOINTMENT (OUTPATIENT)
Dept: RADIOLOGY | Facility: CLINIC | Age: 69
End: 2019-04-25
Payer: MEDICARE

## 2019-04-25 VITALS
HEART RATE: 78 BPM | WEIGHT: 223 LBS | DIASTOLIC BLOOD PRESSURE: 104 MMHG | SYSTOLIC BLOOD PRESSURE: 152 MMHG | BODY MASS INDEX: 35.84 KG/M2 | HEIGHT: 66 IN

## 2019-04-25 DIAGNOSIS — S82.55XD CLOSED NONDISPLACED FRACTURE OF MEDIAL MALLEOLUS OF LEFT TIBIA WITH ROUTINE HEALING, SUBSEQUENT ENCOUNTER: ICD-10-CM

## 2019-04-25 DIAGNOSIS — S82.55XD CLOSED NONDISPLACED FRACTURE OF MEDIAL MALLEOLUS OF LEFT TIBIA WITH ROUTINE HEALING, SUBSEQUENT ENCOUNTER: Primary | ICD-10-CM

## 2019-04-25 PROCEDURE — 73610 X-RAY EXAM OF ANKLE: CPT

## 2019-04-25 PROCEDURE — 99024 POSTOP FOLLOW-UP VISIT: CPT | Performed by: ORTHOPAEDIC SURGERY

## 2019-04-26 ENCOUNTER — EVALUATION (OUTPATIENT)
Dept: PHYSICAL THERAPY | Facility: CLINIC | Age: 69
End: 2019-04-26
Payer: MEDICARE

## 2019-04-26 DIAGNOSIS — S82.55XD CLOSED NONDISPLACED FRACTURE OF MEDIAL MALLEOLUS OF LEFT TIBIA WITH ROUTINE HEALING, SUBSEQUENT ENCOUNTER: Primary | ICD-10-CM

## 2019-04-26 PROCEDURE — 97140 MANUAL THERAPY 1/> REGIONS: CPT | Performed by: PHYSICAL THERAPIST

## 2019-04-26 PROCEDURE — 97110 THERAPEUTIC EXERCISES: CPT | Performed by: PHYSICAL THERAPIST

## 2019-04-29 ENCOUNTER — OFFICE VISIT (OUTPATIENT)
Dept: PHYSICAL THERAPY | Facility: CLINIC | Age: 69
End: 2019-04-29
Payer: MEDICARE

## 2019-04-29 DIAGNOSIS — S82.55XD CLOSED NONDISPLACED FRACTURE OF MEDIAL MALLEOLUS OF LEFT TIBIA WITH ROUTINE HEALING, SUBSEQUENT ENCOUNTER: Primary | ICD-10-CM

## 2019-04-29 PROCEDURE — 97140 MANUAL THERAPY 1/> REGIONS: CPT

## 2019-04-29 PROCEDURE — 97110 THERAPEUTIC EXERCISES: CPT

## 2019-05-03 ENCOUNTER — OFFICE VISIT (OUTPATIENT)
Dept: PHYSICAL THERAPY | Facility: CLINIC | Age: 69
End: 2019-05-03
Payer: MEDICARE

## 2019-05-03 DIAGNOSIS — S82.55XD CLOSED NONDISPLACED FRACTURE OF MEDIAL MALLEOLUS OF LEFT TIBIA WITH ROUTINE HEALING, SUBSEQUENT ENCOUNTER: Primary | ICD-10-CM

## 2019-05-03 PROCEDURE — 97140 MANUAL THERAPY 1/> REGIONS: CPT

## 2019-05-03 PROCEDURE — 97110 THERAPEUTIC EXERCISES: CPT

## 2019-05-08 ENCOUNTER — APPOINTMENT (OUTPATIENT)
Dept: PHYSICAL THERAPY | Facility: CLINIC | Age: 69
End: 2019-05-08
Payer: MEDICARE

## 2019-05-10 ENCOUNTER — OFFICE VISIT (OUTPATIENT)
Dept: PHYSICAL THERAPY | Facility: CLINIC | Age: 69
End: 2019-05-10
Payer: MEDICARE

## 2019-05-10 DIAGNOSIS — S82.55XD CLOSED NONDISPLACED FRACTURE OF MEDIAL MALLEOLUS OF LEFT TIBIA WITH ROUTINE HEALING, SUBSEQUENT ENCOUNTER: Primary | ICD-10-CM

## 2019-05-10 PROCEDURE — 97110 THERAPEUTIC EXERCISES: CPT

## 2019-05-10 PROCEDURE — 97140 MANUAL THERAPY 1/> REGIONS: CPT

## 2019-05-14 ENCOUNTER — HOSPITAL ENCOUNTER (OUTPATIENT)
Dept: MRI IMAGING | Facility: HOSPITAL | Age: 69
Discharge: HOME | End: 2019-05-14
Payer: MEDICARE

## 2019-05-14 DIAGNOSIS — S82.55XD CLOSED NONDISPLACED FRACTURE OF MEDIAL MALLEOLUS OF LEFT TIBIA WITH ROUTINE HEALING, SUBSEQUENT ENCOUNTER: ICD-10-CM

## 2019-05-14 PROCEDURE — 73721 MRI JNT OF LWR EXTRE W/O DYE: CPT

## 2019-05-15 ENCOUNTER — TELEPHONE (OUTPATIENT)
Dept: OBGYN CLINIC | Facility: MEDICAL CENTER | Age: 69
End: 2019-05-15

## 2019-05-22 ENCOUNTER — PATIENT OUTREACH (OUTPATIENT)
Dept: CASE MANAGEMENT | Facility: OTHER | Age: 69
End: 2019-05-22

## 2019-05-31 ENCOUNTER — OFFICE VISIT (OUTPATIENT)
Dept: OBGYN CLINIC | Facility: MEDICAL CENTER | Age: 69
End: 2019-05-31
Payer: MEDICARE

## 2019-05-31 ENCOUNTER — APPOINTMENT (OUTPATIENT)
Dept: RADIOLOGY | Facility: CLINIC | Age: 69
End: 2019-05-31
Payer: MEDICARE

## 2019-05-31 VITALS
BODY MASS INDEX: 35.32 KG/M2 | HEART RATE: 67 BPM | WEIGHT: 219.8 LBS | SYSTOLIC BLOOD PRESSURE: 131 MMHG | HEIGHT: 66 IN | DIASTOLIC BLOOD PRESSURE: 87 MMHG

## 2019-05-31 DIAGNOSIS — S82.55XD CLOSED NONDISPLACED FRACTURE OF MEDIAL MALLEOLUS OF LEFT TIBIA WITH ROUTINE HEALING, SUBSEQUENT ENCOUNTER: ICD-10-CM

## 2019-05-31 DIAGNOSIS — Z87.81 S/P ORIF (OPEN REDUCTION INTERNAL FIXATION) FRACTURE: ICD-10-CM

## 2019-05-31 DIAGNOSIS — M94.9 OSTEOCHONDRAL LESION OF TALAR DOME: ICD-10-CM

## 2019-05-31 DIAGNOSIS — Z98.890 S/P ORIF (OPEN REDUCTION INTERNAL FIXATION) FRACTURE: ICD-10-CM

## 2019-05-31 DIAGNOSIS — M89.9 OSTEOCHONDRAL LESION OF TALAR DOME: ICD-10-CM

## 2019-05-31 DIAGNOSIS — S82.55XD CLOSED NONDISPLACED FRACTURE OF MEDIAL MALLEOLUS OF LEFT TIBIA WITH ROUTINE HEALING, SUBSEQUENT ENCOUNTER: Primary | ICD-10-CM

## 2019-05-31 PROCEDURE — 99213 OFFICE O/P EST LOW 20 MIN: CPT | Performed by: ORTHOPAEDIC SURGERY

## 2019-05-31 PROCEDURE — 73630 X-RAY EXAM OF FOOT: CPT

## 2019-06-05 ENCOUNTER — PATIENT OUTREACH (OUTPATIENT)
Dept: CASE MANAGEMENT | Facility: OTHER | Age: 69
End: 2019-06-05

## 2019-06-27 ENCOUNTER — OFFICE VISIT (OUTPATIENT)
Dept: OBGYN CLINIC | Facility: CLINIC | Age: 69
End: 2019-06-27
Payer: MEDICARE

## 2019-06-27 VITALS
SYSTOLIC BLOOD PRESSURE: 140 MMHG | BODY MASS INDEX: 34.87 KG/M2 | WEIGHT: 217 LBS | DIASTOLIC BLOOD PRESSURE: 70 MMHG | HEART RATE: 96 BPM | HEIGHT: 66 IN

## 2019-06-27 DIAGNOSIS — M94.9 OSTEOCHONDRAL LESION OF TALAR DOME: ICD-10-CM

## 2019-06-27 DIAGNOSIS — M89.9 OSTEOCHONDRAL LESION OF TALAR DOME: ICD-10-CM

## 2019-06-27 DIAGNOSIS — Z98.890 S/P ORIF (OPEN REDUCTION INTERNAL FIXATION) FRACTURE: ICD-10-CM

## 2019-06-27 DIAGNOSIS — M21.42 PES PLANUS OF LEFT FOOT: ICD-10-CM

## 2019-06-27 DIAGNOSIS — Z87.81 S/P ORIF (OPEN REDUCTION INTERNAL FIXATION) FRACTURE: ICD-10-CM

## 2019-06-27 DIAGNOSIS — S82.55XD CLOSED NONDISPLACED FRACTURE OF MEDIAL MALLEOLUS OF LEFT TIBIA WITH ROUTINE HEALING, SUBSEQUENT ENCOUNTER: Primary | ICD-10-CM

## 2019-06-27 PROCEDURE — 99213 OFFICE O/P EST LOW 20 MIN: CPT | Performed by: ORTHOPAEDIC SURGERY

## 2019-07-09 ENCOUNTER — OFFICE VISIT (OUTPATIENT)
Dept: CARDIOLOGY CLINIC | Facility: CLINIC | Age: 69
End: 2019-07-09
Payer: MEDICARE

## 2019-07-09 VITALS
WEIGHT: 218 LBS | DIASTOLIC BLOOD PRESSURE: 70 MMHG | SYSTOLIC BLOOD PRESSURE: 124 MMHG | HEIGHT: 66 IN | OXYGEN SATURATION: 91 % | BODY MASS INDEX: 35.03 KG/M2 | HEART RATE: 62 BPM

## 2019-07-09 DIAGNOSIS — I48.0 PAF (PAROXYSMAL ATRIAL FIBRILLATION) (HCC): Primary | ICD-10-CM

## 2019-07-09 DIAGNOSIS — I10 BENIGN ESSENTIAL HTN: ICD-10-CM

## 2019-07-09 PROCEDURE — 99213 OFFICE O/P EST LOW 20 MIN: CPT | Performed by: INTERNAL MEDICINE

## 2019-07-09 RX ORDER — ASPIRIN 81 MG/1
81 TABLET ORAL DAILY
Start: 2019-07-09

## 2019-07-09 NOTE — PROGRESS NOTES
Cardiology Follow Up    Max Reach  1950  [id number]  [address] [facility] [address]  [phone]    Reason for visit: Patient here for FU of PAF  Ben Lemos Also has HTN    1  PAF (paroxysmal atrial fibrillation) ([facility] [address] )     2  Benign essential HTN         Interval History: SInce her last visit she has not had much in the way of palpitations  She does get sudden lightheadedness at times but she is under incredible stress    This usually happens when she is standing    She does admit to hydration  This occurs once weekly    It seems to occur when she is anxious  She denies CP or SOB   She does have some left ankle edema (recent frx)    Patient Active Problem List   Diagnosis    [facility]    Benign essential HTN    Postablative hypothyroidism    Fall from slipping on ice    Closed fracture of medial malleolus of left tibia    Bimalleolar ankle fracture, left, closed, initial encounter     Past Medical History:   Diagnosis Date    Hypertension      Social History     Socioeconomic History    Marital status: /Civil Union     Spouse name: Not on file    Number of children: Not on file    Years of education: Not on file    Highest education level: Not on file   Occupational History    Not on file   Social Needs    Financial resource strain: Not on file    Food insecurity:     Worry: Not on file     Inability: Not on file    Transportation needs:     Medical: Not on file     Non-medical: Not on file   Tobacco Use    Smoking status: Passive Smoke Exposure - Never Smoker    Smokeless tobacco: Never Used   Substance and Sexual Activity    Alcohol use: No    Drug use: No    Sexual activity: Not on file   Lifestyle    Physical activity:     Days per week: Not on file     Minutes per session: Not on file    Stress: Not on file   Relationships    Social connections: Talks on phone: Not on file     Gets together: Not on file     Attends Protestant service: Not on file     Active member of club or organization: Not on file     Attends meetings of clubs or organizations: Not on file     Relationship status: Not on file    Intimate partner violence:     Fear of current or ex partner: Not on file     Emotionally abused: Not on file     Physically abused: Not on file     Forced sexual activity: Not on file   Other Topics Concern    Not on file   Social History Narrative    Not on file      Family History   Problem Relation Age of Onset    Lung cancer Family     Coronary artery disease Family      Past Surgical History:   Procedure Laterality Date    BREAST LUMPECTOMY      BUNIONECTOMY Left     HAND SURGERY Right     ganglion cyst removal    ORIF ANKLE FRACTURE Right 2005    ORIF TIBIA & FIBULA FRACTURES Left 2/13/2019    Procedure: OPEN REDUCTION W/ INTERNAL FIXATION (ORIF) ANKLE;  Surgeon: Romi Mccullough DO;  Location: [facility] OR;  Service: Orthopedics    THYROIDECTOMY      TONSILLECTOMY AND ADENOIDECTOMY         Current Outpatient Medications:     aspirin (ECOTRIN) 325 mg EC tablet, Take 1 tablet (325 mg total) by mouth 2 (two) times a day for 40 days (Patient taking differently: Take 81 mg by mouth 2 (two) times a day ), Disp: 30 tablet, Rfl: 0    Cholecalciferol (TH VITAMIN D3) 2000 units CAPS, Take 2,000 Units by mouth daily , Disp: , Rfl:     levothyroxine 100 mcg tablet, Take 100 mcg by mouth daily, Disp: , Rfl: 2    nebivolol (BYSTOLIC) 5 mg tablet, Take 1 tablet (5 mg total) by mouth daily, Disp: 90 tablet, Rfl: 3    omeprazole (PriLOSEC) 20 mg delayed release capsule, Take 20 mg by mouth daily , Disp: , Rfl:   No Known Allergies    Review of Systems:  Review of Systems   Constitutional: Positive for fatigue  Negative for appetite change and unexpected weight change  Respiratory: Negative for shortness of breath      Cardiovascular: Positive for palpitations and leg swelling  Negative for chest pain  Gastrointestinal: Negative for blood in stool, constipation and diarrhea  Genitourinary: Negative for frequency  Musculoskeletal: Positive for arthralgias  Negative for back pain  Neurological: Positive for dizziness and light-headedness  Physical Exam:  Vitals:    07/09/19 1532   BP: 124/70   BP Location: Left arm   Patient Position: Sitting   Cuff Size: Adult   Pulse: 62   SpO2: 91%   Weight: 98 9 kg (218 lb)   Height: 5' 6" (1 676 m)       Physical Exam   Constitutional: She appears well-developed and well-nourished  No distress  obese   HENT:   Head: Normocephalic and atraumatic  Mouth/Throat: Oropharynx is clear and moist  No oropharyngeal exudate  Eyes: Conjunctivae are normal  No scleral icterus  Neck: Neck supple  Normal carotid pulses and no JVD present  Carotid bruit is not present  No thyromegaly present  Cardiovascular: Normal rate, regular rhythm, normal heart sounds and intact distal pulses  Exam reveals no gallop and no friction rub  No murmur heard  Pulmonary/Chest: Breath sounds normal  She has no wheezes  She has no rhonchi  She has no rales  Abdominal: Soft  She exhibits no mass  There is no hepatosplenomegaly  There is no tenderness  Musculoskeletal: Edema: trace LE edema  Discussion/Summary:  1  PAF  Relatively quiet at this time  Continue nebivolol for potential rate control as well as hypertension  Note patient did have GI bleed on full-dose aspirin and would be a poor anticoagulation candidate even know her chads Vasc 2 score is mildly elevated at 2  2  Hypertension  Well controlled on nebivolol  Continue same    Do not feel dizziness represents a rhythm disturbance  Perhaps transient drop in blood pressure at times  Patient is hydrating well  These episodes may be very well related to anxiety      FU one year      Ninfa Lind MD

## 2019-08-27 DIAGNOSIS — I10 BENIGN ESSENTIAL HTN: ICD-10-CM

## 2019-08-28 RX ORDER — NEBIVOLOL 5 MG/1
5 TABLET ORAL DAILY
Qty: 90 TABLET | Refills: 3 | Status: SHIPPED | OUTPATIENT
Start: 2019-08-28 | End: 2020-09-18

## 2019-09-16 ENCOUNTER — APPOINTMENT (EMERGENCY)
Dept: NON INVASIVE DIAGNOSTICS | Facility: HOSPITAL | Age: 69
End: 2019-09-16
Payer: MEDICARE

## 2019-09-16 ENCOUNTER — HOSPITAL ENCOUNTER (EMERGENCY)
Facility: HOSPITAL | Age: 69
Discharge: HOME | End: 2019-09-16
Attending: EMERGENCY MEDICINE
Payer: MEDICARE

## 2019-09-16 VITALS
HEART RATE: 63 BPM | BODY MASS INDEX: 35.23 KG/M2 | DIASTOLIC BLOOD PRESSURE: 84 MMHG | OXYGEN SATURATION: 96 % | TEMPERATURE: 97.8 F | RESPIRATION RATE: 16 BRPM | WEIGHT: 218.26 LBS | SYSTOLIC BLOOD PRESSURE: 171 MMHG

## 2019-09-16 DIAGNOSIS — M79.661 RIGHT CALF PAIN: ICD-10-CM

## 2019-09-16 PROCEDURE — 99283 EMERGENCY DEPT VISIT LOW MDM: CPT

## 2019-09-16 PROCEDURE — 93971 EXTREMITY STUDY: CPT

## 2019-09-16 PROCEDURE — 99282 EMERGENCY DEPT VISIT SF MDM: CPT | Performed by: PHYSICIAN ASSISTANT

## 2019-09-16 PROCEDURE — 93971 EXTREMITY STUDY: CPT | Performed by: SURGERY

## 2019-09-16 NOTE — ED PROVIDER NOTES
History  Chief Complaint   [facility]   [facility] a [facility]  This is a 68-year-old female patient noticed last night that she has a red warm on her right lower calf  It is slightly painful  She does have a lot of varicosities no history of DVT  She has never had this before  There is no swelling of the lower extremity  There is no pain of calf  Just a little tender reddened area  No fever no chills no headache blurred vision double vision cough congestion sore throat nausea vomiting diarrhea abdominal pain no chest pain or shortness of breath  Nothing makes it better or worse nothing tried over-the-counter  She is very concerned that she has a DVT  It does appear to be a superficial thrombophlebitis  She will have a ultrasound of her lower extremities to rule out DVT          Prior to Admission Medications   Prescriptions Last Dose Informant Patient Reported? Taking?    Cholecalciferol (TH VITAMIN D3) 2000 units CAPS  Self Yes Yes   Sig: Take 2,000 Units by mouth daily    aspirin (ECOTRIN LOW STRENGTH) 81 mg EC tablet   No Yes   Sig: Take 1 tablet (81 mg total) by mouth daily   levothyroxine 100 mcg tablet  Self Yes Yes   Sig: Take 100 mcg by mouth daily   nebivolol (BYSTOLIC) 5 mg tablet   No Yes   Sig: Take 1 tablet (5 mg total) by mouth daily   omeprazole (PriLOSEC) 20 mg delayed release capsule  Self Yes Yes   Sig: Take 20 mg by mouth daily       Facility-Administered Medications: None       Past Medical History:   Diagnosis Date    Hypertension        Past Surgical History:   Procedure Laterality Date    BREAST LUMPECTOMY      BUNIONECTOMY Left     HAND SURGERY Right     ganglion cyst removal    ORIF ANKLE FRACTURE Right 2005    ORIF TIBIA & FIBULA FRACTURES Left 2/13/2019    Procedure: OPEN REDUCTION W/ INTERNAL FIXATION (ORIF) ANKLE;  Surgeon: Jasmyn Alexis DO;  Location: [facility];  Service: Orthopedics    THYROIDECTOMY      TONSILLECTOMY AND ADENOIDECTOMY         Family History   Problem Relation Age of Onset    Lung cancer Family     Coronary artery disease Family      I have reviewed and agree with the history as documented  Social History     Tobacco Use    Smoking status: Passive Smoke Exposure - Never Smoker    Smokeless tobacco: Never Used   Substance Use Topics    Alcohol use: No    Drug use: No        Review of Systems   All other systems reviewed and are negative  Physical Exam  Physical Exam   Constitutional: She appears well-developed and well-nourished  HENT:   Head: Normocephalic and atraumatic  Right Ear: External ear normal    Left Ear: External ear normal    Nose: Nose normal    Mouth/Throat: Oropharynx is clear and moist    Eyes: Pupils are equal, round, and reactive to light  Conjunctivae are normal    Neck: Normal range of motion  Neck supple  Cardiovascular: Normal rate and regular rhythm  Pulmonary/Chest: Effort normal and breath sounds normal    Abdominal: Soft  Bowel sounds are normal  There is no tenderness  Musculoskeletal:        Legs:  Neurological: She is alert  Skin: Skin is warm  Psychiatric: She has a normal mood and affect  Her behavior is normal    Nursing note and vitals reviewed        Vital Signs  ED Triage Vitals [09/16/19 1024]   Temperature Pulse Respirations Blood Pressure SpO2   97 8 °F (36 6 °C) 63 16 (!) 171/84 96 %      Temp Source Heart Rate Source Patient Position - Orthostatic VS BP Location FiO2 (%)   Oral -- Sitting Right arm --      Pain Score       No Pain           Vitals:    09/16/19 1024   BP: (!) 171/84   Pulse: 63   Patient Position - Orthostatic VS: Sitting         Visual Acuity      ED Medications  Medications - No data to display    Diagnostic Studies  Results Reviewed     None                 VAS lower limb venous duplex study, unilateral/limited    (Results Pending)              Procedures  Procedures       ED Course  ED Course as of Sep 16 1211 Mon Sep 16, 2019   1206 No DVT as per vascular tech  No abscess or infection  She did notice a little round structure which she thought was a fatty deposit  [id number] Medical decision making:  Patient has an area of her right calf and has some mild erythema without fluctuance or induration vascular study was negative except for fatty deposit  She be given anti-inflammatory follow up family doctor                                  MDM    Disposition  Final diagnoses:   Right calf pain     Time reflects when diagnosis was documented in both MDM as applicable and the Disposition within this note     Time User Action Codes Description Comment    9/16/2019 12:09 PM Az Handley Add [S75 575] Right calf pain     9/16/2019 12:10 PM Az Handley Modify [N64 338] Right calf pain       ED Disposition     ED Disposition Condition Date/Time Comment    Discharge Stable Mon Sep 16, 2019 12:09  Efrain Lyn discharge to home/self care  Follow-up Information     Follow up With Specialties Details Why Contact Info    Cheyanne Hickey MD [address]            Patient's Medications   Discharge Prescriptions    DICLOFENAC SODIUM (VOLTAREN) 50 MG EC TABLET    Take 1 tablet (50 mg total) by mouth 2 (two) times a day for 5 days       Start Date: 9/16/2019 End Date: 9/21/2019       Order Dose: 50 mg       Quantity: 10 tablet    Refills: 0     No discharge procedures on file      ED Provider  Electronically Signed by           Penny Myers PA-C  09/16/19 4271

## 2019-09-16 NOTE — DISCHARGE INSTRUCTIONS
1  He did not have a blood clot in her leg  2  The vascular tech saw a fatty deposit in your calf no infection  3    Follow-up with your family doctor this week for recheck

## 2019-10-08 ENCOUNTER — OFFICE VISIT (OUTPATIENT)
Dept: OBGYN CLINIC | Facility: CLINIC | Age: 69
End: 2019-10-08
Payer: MEDICARE

## 2019-10-08 VITALS
HEIGHT: 66 IN | SYSTOLIC BLOOD PRESSURE: 146 MMHG | BODY MASS INDEX: 35.23 KG/M2 | HEART RATE: 66 BPM | DIASTOLIC BLOOD PRESSURE: 85 MMHG

## 2019-10-08 DIAGNOSIS — M76.72 PERONEAL TENDINITIS OF LEFT LOWER LEG: Primary | ICD-10-CM

## 2019-10-08 PROCEDURE — 99213 OFFICE O/P EST LOW 20 MIN: CPT | Performed by: ORTHOPAEDIC SURGERY

## 2019-10-08 NOTE — PROGRESS NOTES
VIOLETA Sommers  Attending, Orthopaedic Surgery  [facility] [address] [facility]      ORTHOPAEDIC FOOT AND ANKLE CLINIC VISIT     Assessment:     Encounter Diagnosis   Name Primary?  Peroneal tendinitis of left lower leg Yes            Plan:   · The patient verbalized understanding of exam findings and treatment plan  We engaged in the shared decision-making process and treatment options were discussed at length with the patient  Surgical and conservative management discussed today along with risks and benefits  · Killian Oneill was given a referral to physical therapy with Tay Rios for left peroneal tendinitis  She would like to hold off on surgical intervention for hardware removal left ankle  · She is to perform activity to tolerance  · Advised on proper shoe wear  · She can take NSAIDS for pain control as needed  Return in about 2 months (around 12/8/2019) for Recheck left ankle  Laxmi Allendale History of Present Illness:   Chief Complaint:   Chief Complaint   Patient presents with    Left Ankle - Follow-up     Arthur Chavez is a 79 y o  female who is being seen in follow-up for left ankle pain  When we last saw she we recommended for her to wear an orthotic, recommended hardware removal left ankle  Pain has not improved  Residual pain is localized at lateral ankle with minimal radiating and described as sharp and severe  Pain/symptom timing:  Worse during the day when active  Pain/symptom context:  Worse with activites and work  Pain/symptom modifying factors:  Rest makes better, activities make worse  Pain/symptom associated signs/symptoms: none    Prior treatment   · NSAIDsYes   · Injections No   · Bracing/Orthotics Yes    · Physical Therapy Yes     Orthopedic Surgical History:   See below    Past Medical, Surgical and Social History:  Past Medical History:  has a past medical history of Hypertension    Problem List: does not have any pertinent problems on file   Past Surgical History:  has a past surgical history that includes Thyroidectomy; Breast lumpectomy; ORIF tibia & fibula fractures (Left, 2/13/2019); Hand surgery (Right); Tonsillectomy and adenoidectomy; ORIF ankle fracture (Right, 2005); and Bunionectomy (Left)  Family History: family history includes Coronary artery disease in her family; Lung cancer in her family  Social History:  reports that she is a non-smoker but has been exposed to tobacco smoke  She has never used smokeless tobacco  She reports that she does not drink alcohol or use drugs  Current Medications: has a current medication list which includes the following prescription(s): aspirin, cholecalciferol, levothyroxine, nebivolol, omeprazole, and diclofenac sodium  Allergies: has No Known Allergies  Review of Systems:  General- denies fever/chills  HEENT- denies hearing loss or sore throat  Eyes- denies eye pain or visual disturbances, denies red eyes  Respiratory- denies cough or SOB  Cardio- denies chest pain or palpitations  GI- denies abdominal pain  Endocrine- denies urinary frequency  Urinary- denies pain with urination  Musculoskeletal- Negative except noted above  Skin- denies rashes or wounds  Neurological- denies dizziness or headache  Psychiatric- denies anxiety or difficulty concentrating    Physical Exam:   /85 (BP Location: Left arm, Patient Position: Sitting, Cuff Size: Adult)   Pulse 66   Ht 5' 6" (1 676 m)   BMI 35 23 kg/m²   General/Constitutional: No apparent distress: well-nourished and well developed  Eyes: normal ocular motion  Lymphatic: No appreciable lymphadenopathy  Respiratory: Non-labored breathing  Vascular: No edema, swelling or tenderness, except as noted in detailed exam   Integumentary: No impressive skin lesions present, except as noted in detailed exam   Neuro: No ataxia or tremors noted  Psych: Normal mood and affect, oriented to person, place and time  Appropriate affect    Musculoskeletal: Normal, except as noted in detailed exam and in HPI  Examination    Left    Gait Antalgic   Musculoskeletal Tender to palpation at lateral ankle, peroneal tendon    Skin  Mild swelling lateral ankle  Well-healed incisions  Nails Normal    Range of Motion  10 degrees dorsiflexion, 30 degrees plantarflexion  Subtalar motion: wnl, valgus heel in stance correctable  Stability Stable    Muscle Strength 5/5 tibialis anterior  5/5 gastrocnemius-soleus  5/5 posterior tibialis  4/5 peroneal/eversion strength  5/5 EHL  5/5 FHL    Neurologic Normal    Sensation Intact to light touch throughout sural, saphenous, superficial peroneal, deep peroneal and medial/lateral plantar nerve distributions  [place] 5 07 filament (10g) testing deferred  Cardiovascular Brisk capillary refill < 2 seconds,intact DP and PT pulses    Special Tests Anterior Drawer:  Negative  Imaging Studies:   No new imaging    Scribe Attestation    I,:   Prabhu Gladys am acting as a scribe while in the presence of the attending physician :        I,:   Jabier Cogan, MD personally performed the services described in this documentation    as scribed in my presence :                Talbert Elks Lachman, MD  Foot & Ankle Surgery   Department of [address] [place] [address]      I personally performed the service  Talbert Elks Lachman, MD

## 2019-10-25 ENCOUNTER — TRANSCRIBE ORDERS (OUTPATIENT)
Dept: ADMINISTRATIVE | Facility: HOSPITAL | Age: 69
End: 2019-10-25

## 2019-10-25 DIAGNOSIS — Z12.31 OTHER SCREENING MAMMOGRAM: Primary | ICD-10-CM

## 2019-10-29 ENCOUNTER — EVALUATION (OUTPATIENT)
Dept: PHYSICAL THERAPY | Facility: CLINIC | Age: 69
End: 2019-10-29
Payer: MEDICARE

## 2019-10-29 DIAGNOSIS — M76.72 PERONEAL TENDINITIS OF LEFT LOWER LEG: ICD-10-CM

## 2019-10-29 PROCEDURE — 97140 MANUAL THERAPY 1/> REGIONS: CPT | Performed by: PHYSICAL THERAPIST

## 2019-10-29 PROCEDURE — 97162 PT EVAL MOD COMPLEX 30 MIN: CPT | Performed by: PHYSICAL THERAPIST

## 2019-10-29 NOTE — PROGRESS NOTES
PT Evaluation     Today's date: 10/29/2019  Patient name: Marcela Fonseca  : 1950  MRN: [id number]  Referring provider: Tay Rico MD  Dx:   Encounter Diagnosis     ICD-10-CM    1  Peroneal tendinitis of left lower leg M[age] Ambulatory referral to Physical Therapy                  Assessment  Assessment details: Marcela Fonseca is a 79 y o  female present with:   Peroneal tendinitis of left lower leg    Matt Weber has the above listed impairments and will benefit from skilled PT to improve deficits to return to prior level of function  She reported a significant improvement in symptoms after manual techniques  Impairments: abnormal muscle firing, abnormal or restricted ROM, activity intolerance, impaired physical strength, lacks appropriate home exercise program and pain with function  Understanding of Dx/Px/POC: good   Prognosis: good    Goals  Impairment Goals  - Decrease pain   - Improve ROM to [facility]  - Increase strength     Functional Goals  - Return to Prior Level of Function  - Increase Functional Status Measure  - Patient will be independent with HEP    Plan  Patient would benefit from: skilled PT  Planned therapy interventions: joint mobilization, manual therapy, patient education, postural training, activity modification, abdominal trunk stabilization, body mechanics training, flexibility, functional ROM exercises, graded exercise, home exercise program, neuromuscular re-education, strengthening, stretching, therapeutic activities and therapeutic exercise  Frequency: 2x week  Duration in weeks: 8  Treatment plan discussed with: patient        Subjective Evaluation    History of Present Illness  Mechanism of injury: Pt presents with L ankle pain  She had an ORIF last Feb with subsequent PT    Recently she has experienced increased pain mostly in lat aspect of ankle  She has difficulty with movement byron inversion, pain also increases with prolonged WB  See chart for HEP      Pain  Current pain ratin  At best pain ratin  At worst pain rating: 10      Diagnostic Tests  X-ray: abnormal  MRI studies: abnormal        Objective  Severe pes planus L  Hallux valgus, lat ankle edema  Gait: antalgic, lacks supination R rocker phase  Sensation grossly in tact to LT      Hallux DF L 40 deg  PROM L:  Inv 0 deg with lateral pain  Ev 10 deg  DF 12 deg    Severe hypomobility L ankle and STJ; improved with joint mobilizations         Strength not formally tested                     Precautions: hx     Daily Treatment Diary       Manuals 10/29            t-c, stj tommie mb            PROM mb                                      Exercise Diary              AROM IP            Short foot             Toe crunches             tband                                                                                                                                                                                                                                                       Modalities

## 2019-11-01 NOTE — PROGRESS NOTES
Assessment/Plan:    Benign findings on routine gyn exam  Recommended monthly SBE, annual CBE and annual screening mammo  ASCCP guidelines reviewed and pap with cotesting noted to be up to date; this low risk patient was advised she meets criteria to d/c pap screening at age 74  Pap done today  DEXA and colonoscopy noted to be up to date  The patient denies STI risk factors and declines testing at this time  Reviewed diet/activity recommendations Calcium 4535-0431 mg and Vit D 600-1000 IU daily  Discussed postmenopausal considerations and symptoms to report  Kegel exercises as instructed  RTO in one year for routine annual gyn exam or sooner PRN  Diagnoses and all orders for this visit:    Encounter for gynecological examination (general) (routine) without abnormal findings        Subjective:      Patient ID: Denise Farmer is a 79 y o  female  This patient presents for routine annual gyn exam   She denies acute gyn complaints  She denies  bleeding or spotting, VM sx, pelvic pain, dyspareunia, breast concerns, abnormal discharge, bowel/bladder dysfunction, depression/anx  , sexually active and is monogamous  Denies STI concerns  No hx of STIs  Last pap 2013  Mammography up to date and normal, 2014, scheduled for 12/26/19  Osteoporosis screening up to date with DXA revealing osteopenia in 6/24/19  Colonoscopy up to date and due again next year per patient  The following portions of the patient's history were reviewed and updated as appropriate: allergies, current medications, past family history, past medical history, past social history, past surgical history and problem list     Review of Systems   Constitutional: Negative  Respiratory: Negative  Cardiovascular: Negative  Gastrointestinal: Negative  Endocrine: Negative  Genitourinary: Negative for dyspareunia, dysuria, frequency, pelvic pain, urgency, vaginal bleeding, vaginal discharge and vaginal pain  Musculoskeletal: Negative  Skin: Negative  Neurological: Negative  Psychiatric/Behavioral: Negative  Objective:      /86 (BP Location: Right arm)   Pulse 65   Ht 5' 6 5" (1 689 m)   Wt 102 kg (224 lb 3 2 oz)   LMP  (LMP Unknown)   BMI 35 64 kg/m²          Physical Exam   Constitutional: She is oriented to person, place, and time  She appears well-developed and well-nourished  She is cooperative  HENT:   Head: Normocephalic and atraumatic  Neck: Normal range of motion  Neck supple  No thyroid mass present  S/p thyroidectomy     Cardiovascular: Normal rate, regular rhythm and normal heart sounds  Pulmonary/Chest: Effort normal and breath sounds normal  Right breast exhibits no inverted nipple, no mass, no nipple discharge, no skin change and no tenderness  Left breast exhibits no inverted nipple, no mass, no nipple discharge, no skin change and no tenderness  No breast tenderness or discharge  Breasts are symmetrical    Abdominal: Soft  Normal appearance and bowel sounds are normal  There is no hepatosplenomegaly  There is no tenderness  Hernia confirmed negative in the right inguinal area and confirmed negative in the left inguinal area  Genitourinary: Vagina normal and uterus normal  Rectal exam shows no external hemorrhoid  No breast tenderness or discharge  Pelvic exam was performed with patient supine  No labial fusion  There is no rash, tenderness, lesion or injury on the right labia  There is no rash, tenderness, lesion or injury on the left labia  Uterus is not deviated, not enlarged, not fixed and not tender  Cervix exhibits no motion tenderness, no discharge and no friability  Right adnexum displays no mass, no tenderness and no fullness  Left adnexum displays no mass, no tenderness and no fullness  No erythema, tenderness or bleeding in the vagina  No signs of injury around the vagina  No vaginal discharge found     Genitourinary Comments: Urethra normal without lesions  No bladder tenderness   Musculoskeletal: Normal range of motion  Lymphadenopathy:     She has no axillary adenopathy  No inguinal adenopathy noted on the right or left side  Right: No inguinal adenopathy present  Left: No inguinal adenopathy present  Neurological: She is alert and oriented to person, place, and time  Skin: Skin is warm, dry and intact  Psychiatric: She has a normal mood and affect  Her speech is normal and behavior is normal  Cognition and memory are normal    Nursing note and vitals reviewed

## 2019-11-04 ENCOUNTER — ANNUAL EXAM (OUTPATIENT)
Dept: GYNECOLOGY | Facility: CLINIC | Age: 69
End: 2019-11-04
Payer: MEDICARE

## 2019-11-04 VITALS
DIASTOLIC BLOOD PRESSURE: 86 MMHG | SYSTOLIC BLOOD PRESSURE: 130 MMHG | HEART RATE: 65 BPM | BODY MASS INDEX: 35.19 KG/M2 | WEIGHT: 224.2 LBS | HEIGHT: 67 IN

## 2019-11-04 DIAGNOSIS — Z01.419 ENCOUNTER FOR GYNECOLOGICAL EXAMINATION (GENERAL) (ROUTINE) WITHOUT ABNORMAL FINDINGS: Primary | ICD-10-CM

## 2019-11-04 DIAGNOSIS — Z01.419 ENCOUNTER FOR GYNECOLOGICAL EXAMINATION WITH PAPANICOLAOU SMEAR OF CERVIX: ICD-10-CM

## 2019-11-04 PROCEDURE — G0101 CA SCREEN;PELVIC/BREAST EXAM: HCPCS | Performed by: OBSTETRICS & GYNECOLOGY

## 2019-11-04 PROCEDURE — 87624 HPV HI-RISK TYP POOLED RSLT: CPT | Performed by: OBSTETRICS & GYNECOLOGY

## 2019-11-04 PROCEDURE — G0145 SCR C/V CYTO,THINLAYER,RESCR: HCPCS | Performed by: OBSTETRICS & GYNECOLOGY

## 2019-11-04 NOTE — PATIENT INSTRUCTIONS
Benign findings on routine gyn exam  Recommended monthly SBE, annual CBE and annual screening mammo  ASCCP guidelines reviewed and pap with cotesting noted to be up to date; this low risk patient was advised she meets criteria to d/c pap screening at age 74  Pap done today  DEXA and colonoscopy noted to be up to date  The patient denies STI risk factors and declines testing at this time  Reviewed diet/activity recommendations Calcium 3478-9819 mg and Vit D 600-1000 IU daily  Discussed postmenopausal considerations and symptoms to report  Kegel exercises as instructed  RTO in one year for routine annual gyn exam or sooner PRN

## 2019-11-06 LAB
HPV HR 12 DNA CVX QL NAA+PROBE: NEGATIVE
HPV16 DNA CVX QL NAA+PROBE: NEGATIVE
HPV18 DNA CVX QL NAA+PROBE: NEGATIVE

## 2019-11-08 ENCOUNTER — APPOINTMENT (OUTPATIENT)
Dept: PHYSICAL THERAPY | Facility: CLINIC | Age: 69
End: 2019-11-08
Payer: MEDICARE

## 2019-11-08 LAB
LAB AP GYN PRIMARY INTERPRETATION: NORMAL
Lab: NORMAL

## 2019-11-11 ENCOUNTER — OFFICE VISIT (OUTPATIENT)
Dept: PHYSICAL THERAPY | Facility: CLINIC | Age: 69
End: 2019-11-11
Payer: MEDICARE

## 2019-11-11 DIAGNOSIS — M76.72 PERONEAL TENDINITIS OF LEFT LOWER LEG: Primary | ICD-10-CM

## 2019-11-11 PROCEDURE — 97140 MANUAL THERAPY 1/> REGIONS: CPT | Performed by: PHYSICAL THERAPIST

## 2019-11-11 PROCEDURE — 97110 THERAPEUTIC EXERCISES: CPT | Performed by: PHYSICAL THERAPIST

## 2019-11-11 NOTE — PROGRESS NOTES
Daily Note     Today's date: 2019  Patient name: Abbie Velazquez  : 1950  MRN: [id number]  Referring provider: Juma Brown MD  Dx:   Encounter Diagnosis     ICD-10-CM    1  Peroneal tendinitis of left lower leg M76 74                   Subjective: "its getting a little looser" felt a lot better after last session  Got new shoes ([employer]) and Superfeet inserts  Objective: See treatment diary below  Improved ROM with manual techniques      Assessment: Tolerated treatment well  Patient would benefit from continued PT      Plan: Continue per plan of care              Precautions: hx     Daily Treatment Diary       Manuals 10/29 11/11           t-c, stj mobkimo rodriguez           PROM mb mb                                     Exercise Diary              AROM IP 30           Short foot             Toe crunches             tband             Seated supination  30           Seated heel raise  nv           stairs  1x                                                                                                                                                                                                              Modalities

## 2019-11-14 ENCOUNTER — OFFICE VISIT (OUTPATIENT)
Dept: PHYSICAL THERAPY | Facility: CLINIC | Age: 69
End: 2019-11-14
Payer: MEDICARE

## 2019-11-14 DIAGNOSIS — M76.72 PERONEAL TENDINITIS OF LEFT LOWER LEG: Primary | ICD-10-CM

## 2019-11-14 PROCEDURE — 97110 THERAPEUTIC EXERCISES: CPT | Performed by: PHYSICAL THERAPIST

## 2019-11-14 PROCEDURE — 97140 MANUAL THERAPY 1/> REGIONS: CPT | Performed by: PHYSICAL THERAPIST

## 2019-11-14 NOTE — PROGRESS NOTES
Daily Note     Today's date: 2019  Patient name: Sherron Brooks  : 1950  MRN: [id number]  Referring provider: Kate Magallon MD  Dx:   Encounter Diagnosis     ICD-10-CM    1  Peroneal tendinitis of left lower leg M76 74                   Subjective: pt reports that she was a little sore after last visit  Feels a little unstable w/o brace      Objective: See treatment diary below  Advised pt to continue use of brace, can start to wean off of it when home relaxing    Assessment: Tolerated treatment well  Patient would benefit from continued PT      Plan: Continue per plan of care              Precautions: hx     Daily Treatment Diary       Manuals 10/29 11/11 11/14          t-c, stj mobkimo rodriguez          PROM michael rodriguez                                    Exercise Diary              AROM IP 30 30          Short foot             Toe crunches             tband             Seated supination  30 30          Seated heel raise  nv 30          stairs  1x           alphabet   1x          Inv isomet   x10                                                                                                                                                                                   Modalities

## 2019-11-18 ENCOUNTER — OFFICE VISIT (OUTPATIENT)
Dept: PHYSICAL THERAPY | Facility: CLINIC | Age: 69
End: 2019-11-18
Payer: MEDICARE

## 2019-11-18 DIAGNOSIS — M76.72 PERONEAL TENDINITIS OF LEFT LOWER LEG: Primary | ICD-10-CM

## 2019-11-18 PROCEDURE — 97116 GAIT TRAINING THERAPY: CPT | Performed by: PHYSICAL THERAPIST

## 2019-11-18 PROCEDURE — 97110 THERAPEUTIC EXERCISES: CPT | Performed by: PHYSICAL THERAPIST

## 2019-11-18 PROCEDURE — 97140 MANUAL THERAPY 1/> REGIONS: CPT | Performed by: PHYSICAL THERAPIST

## 2019-11-18 NOTE — PROGRESS NOTES
Daily Note     Today's date: 2019  Patient name: Marcela Fonseca  : 1950  MRN: [id number]  Referring provider: Tay Rico MD  Dx:   Encounter Diagnosis     ICD-10-CM    1  Peroneal tendinitis of left lower leg M76 74                   Subjective: reports continued improvement      Objective: See treatment diary below    Emphasized propulsion phase with gait training    Assessment: Tolerated treatment well  Patient would benefit from continued PT      Plan: Continue per plan of care              Precautions: hx     Daily Treatment Diary       Manuals 10/29 11/11 11/14 11/18         t-c, stj mobs mb mb mb mb         PROM mb mb mb mb                                   Exercise Diary              AROM IP 30 30 30         Short foot             Toe crunches             tband             Seated supination  30 30 30         Seated heel raise  nv 30 30         stairs  1x           alphabet   1x          Inv isomet   x10 10         gait    mb         SLB    1'                                                                                                                                                        Modalities

## 2019-11-21 ENCOUNTER — OFFICE VISIT (OUTPATIENT)
Dept: PHYSICAL THERAPY | Facility: CLINIC | Age: 69
End: 2019-11-21
Payer: MEDICARE

## 2019-11-21 DIAGNOSIS — M76.72 PERONEAL TENDINITIS OF LEFT LOWER LEG: Primary | ICD-10-CM

## 2019-11-21 PROCEDURE — 97110 THERAPEUTIC EXERCISES: CPT | Performed by: PHYSICAL THERAPIST

## 2019-11-21 PROCEDURE — 97140 MANUAL THERAPY 1/> REGIONS: CPT | Performed by: PHYSICAL THERAPIST

## 2019-11-21 NOTE — PROGRESS NOTES
Daily Note     Today's date: 2019  Patient name: Avery Prader  : 1950  MRN: [id number]  Referring provider: Darren Navarro MD  Dx:   Encounter Diagnosis     ICD-10-CM    1  Peroneal tendinitis of left lower leg M76 74                   Subjective: Pt reports that she slipped on a wet floor and L foot slid into a table leg  Immediate severe pain but resolved after a while  Does not feel much worse overall as a result      Objective: See treatment diary below  No change in edema noted  ROM improves with manual techniques      Assessment: Tolerated treatment well  Patient would benefit from continued PT      Plan: Continue per plan of care              Precautions: hx     Daily Treatment Diary       Manuals 10/29 11/11 11/14 11/18 11/21        t-c, rachel mobs mb mb mb mb mb        PROM mb mb mb mb mb                                  Exercise Diary              AROM IP 30 30 30 30        Short foot             Toe crunches             tband             Seated supination  30 30 30 30        Seated heel raise  nv 30 30         stairs  1x           alphabet   1x          Inv isomet   x10 10         gait    mb propulsion        SLB    1' 1x2                                                                                                                                                       Modalities

## 2019-11-25 ENCOUNTER — APPOINTMENT (OUTPATIENT)
Dept: PHYSICAL THERAPY | Facility: CLINIC | Age: 69
End: 2019-11-25
Payer: MEDICARE

## 2019-12-10 ENCOUNTER — OFFICE VISIT (OUTPATIENT)
Dept: OBGYN CLINIC | Facility: CLINIC | Age: 69
End: 2019-12-10
Payer: MEDICARE

## 2019-12-10 VITALS
HEART RATE: 76 BPM | DIASTOLIC BLOOD PRESSURE: 74 MMHG | SYSTOLIC BLOOD PRESSURE: 164 MMHG | WEIGHT: 224 LBS | BODY MASS INDEX: 35.16 KG/M2 | HEIGHT: 67 IN

## 2019-12-10 DIAGNOSIS — M76.72 PERONEAL TENDONITIS OF LEFT LOWER EXTREMITY: Primary | ICD-10-CM

## 2019-12-10 PROCEDURE — 99213 OFFICE O/P EST LOW 20 MIN: CPT | Performed by: ORTHOPAEDIC SURGERY

## 2019-12-10 NOTE — PROGRESS NOTES
VIOLETA Sommers  Attending, Orthopaedic Surgery  [facility] [address] [address] [facility]      ORTHOPAEDIC FOOT AND ANKLE CLINIC VISIT     Assessment:     Encounter Diagnosis   Name Primary?  Peroneal tendonitis of left lower extremity Yes            Plan:   · The patient verbalized understanding of exam findings and treatment plan  We engaged in the shared decision-making process and treatment options were discussed at length with the patient  Surgical and conservative management discussed today along with risks and benefits  · She has seen significant improvement with PT  She is seeing [facility]  · Activity as tolerated  · Continue physical therapy as previously directed  · NSAIDs as needed  · Follow-up as needed      History of Present Illness:   Chief Complaint:   Chief Complaint   Patient presents with   [address] [facility] [address] is a 81 y o  female who is being seen in follow-up for left peroneal tendinitis  When we last saw she we recommended physical therapy, activity modification and appropriate shoe wear  Pain has significantly improved  Residual pain is localized at lateral aspect of the ankle with minimal radiating and described as sharp and severe  Pain/symptom timing:  Worse during the day when active  Pain/symptom context:  Worse with activites and work  Pain/symptom modifying factors:  Rest makes better, activities make worse  Pain/symptom associated signs/symptoms: none    Prior treatment   · NSAIDsYes   · Injections No   · Bracing/Orthotics No    · Physical Therapy Yes     Orthopedic Surgical History:   Previous bilateral bunionectomies as well as ORIF for right bimalleolar ankle fracture    Past Medical, Surgical and Social History:  Past Medical History:  has a past medical history of Hypertension  Problem List: does not have any pertinent problems on file    Past Surgical History:  has a past surgical history that includes Thyroidectomy; Breast lumpectomy; ORIF tibia & fibula fractures (Left, 2/13/2019); Hand surgery (Right); Tonsillectomy and adenoidectomy; ORIF ankle fracture (Right, 2005); and Bunionectomy (Left)  Family History: family history includes Coronary artery disease in her family; Lung cancer in her family  Social History:  reports that she is a non-smoker but has been exposed to tobacco smoke  She has never used smokeless tobacco  She reports that she does not drink alcohol or use drugs  Current Medications: has a current medication list which includes the following prescription(s): aspirin, cholecalciferol, levothyroxine, nebivolol, omeprazole, and diclofenac sodium  Allergies: has No Known Allergies  Review of Systems:  General- denies fever/chills  HEENT- denies hearing loss or sore throat  Eyes- denies eye pain or visual disturbances, denies red eyes  Respiratory- denies cough or SOB  Cardio- denies chest pain or palpitations  GI- denies abdominal pain  Endocrine- denies urinary frequency  Urinary- denies pain with urination  Musculoskeletal- Negative except noted above  Skin- denies rashes or wounds  Neurological- denies dizziness or headache  Psychiatric- denies anxiety or difficulty concentrating    Physical Exam:   /74 (BP Location: Right arm, Patient Position: Sitting, Cuff Size: Adult)   Pulse 76   Ht 5' 6 5" (1 689 m)   Wt 102 kg (224 lb)   LMP  (LMP Unknown)   BMI 35 61 kg/m²   General/Constitutional: No apparent distress: well-nourished and well developed  Eyes: normal ocular motion  Lymphatic: No appreciable lymphadenopathy  Respiratory: Non-labored breathing  Vascular: No edema, swelling or tenderness, except as noted in detailed exam   Integumentary: No impressive skin lesions present, except as noted in detailed exam   Neuro: No ataxia or tremors noted  Psych: Normal mood and affect, oriented to person, place and time  Appropriate affect    Musculoskeletal: Normal, except as noted in detailed exam and in HPI  Examination    Left    Gait Normal   Musculoskeletal No tenderness to palpation appreciated    Skin Normal   Well-healed incisions  Nails Normal    Range of Motion  Full and painless dorsiflexion, full and painless plantarflexion  Subtalar motion:  Full    Stability Stable    Muscle Strength 5/5 tibialis anterior  5/5 gastrocnemius-soleus  5/5 posterior tibialis  5/5 peroneal/eversion strength  5/5 EHL  5/5 FHL    Neurologic Normal    Sensation Intact to light touch throughout sural, saphenous, superficial peroneal, deep peroneal and medial/lateral plantar nerve distributions  Hailey-Lubna 5 07 filament (10g) testing deferred  Cardiovascular Brisk capillary refill < 2 seconds,intact DP and PT pulses    Special Tests None      Imaging Studies:   No new imaging      James R Lachman, MD  Foot & Ankle Surgery   Department of [address] [place] [address]      I personally performed the service  Marguerite Ponder Lachman, MD

## 2020-01-03 ENCOUNTER — HOSPITAL ENCOUNTER (OUTPATIENT)
Dept: MAMMOGRAPHY | Facility: MEDICAL CENTER | Age: 70
Discharge: HOME | End: 2020-01-03
Payer: MEDICARE

## 2020-01-03 VITALS — HEIGHT: 67 IN | WEIGHT: 224 LBS | BODY MASS INDEX: 35.16 KG/M2

## 2020-01-03 DIAGNOSIS — Z12.31 OTHER SCREENING MAMMOGRAM: ICD-10-CM

## 2020-01-03 PROCEDURE — 77063 BREAST TOMOSYNTHESIS BI: CPT

## 2020-01-03 PROCEDURE — 77067 SCR MAMMO BI INCL CAD: CPT

## 2020-01-20 DIAGNOSIS — K21.9 GASTROESOPHAGEAL REFLUX DISEASE WITHOUT ESOPHAGITIS: Primary | ICD-10-CM

## 2020-01-20 RX ORDER — OMEPRAZOLE 20 MG/1
20 CAPSULE, DELAYED RELEASE ORAL DAILY
Qty: 90 CAPSULE | Refills: 3 | Status: SHIPPED | OUTPATIENT
Start: 2020-01-20 | End: 2020-12-12

## 2020-06-04 ENCOUNTER — EVALUATION (OUTPATIENT)
Dept: PHYSICAL THERAPY | Facility: CLINIC | Age: 70
End: 2020-06-04
Payer: MEDICARE

## 2020-06-04 DIAGNOSIS — M25.572 CHRONIC PAIN OF LEFT ANKLE: Primary | ICD-10-CM

## 2020-06-04 DIAGNOSIS — G89.29 CHRONIC PAIN OF LEFT ANKLE: Primary | ICD-10-CM

## 2020-06-04 PROCEDURE — 97161 PT EVAL LOW COMPLEX 20 MIN: CPT | Performed by: PHYSICAL THERAPIST

## 2020-06-04 PROCEDURE — 97140 MANUAL THERAPY 1/> REGIONS: CPT | Performed by: PHYSICAL THERAPIST

## 2020-06-04 PROCEDURE — 97110 THERAPEUTIC EXERCISES: CPT | Performed by: PHYSICAL THERAPIST

## 2020-06-08 ENCOUNTER — OFFICE VISIT (OUTPATIENT)
Dept: PHYSICAL THERAPY | Facility: CLINIC | Age: 70
End: 2020-06-08
Payer: MEDICARE

## 2020-06-08 DIAGNOSIS — G89.29 CHRONIC PAIN OF LEFT ANKLE: Primary | ICD-10-CM

## 2020-06-08 DIAGNOSIS — M25.572 CHRONIC PAIN OF LEFT ANKLE: Primary | ICD-10-CM

## 2020-06-08 PROCEDURE — 97760 ORTHOTIC MGMT&TRAING 1ST ENC: CPT | Performed by: PHYSICAL THERAPIST

## 2020-06-08 PROCEDURE — 97140 MANUAL THERAPY 1/> REGIONS: CPT | Performed by: PHYSICAL THERAPIST

## 2020-06-10 ENCOUNTER — OFFICE VISIT (OUTPATIENT)
Dept: PHYSICAL THERAPY | Facility: CLINIC | Age: 70
End: 2020-06-10
Payer: MEDICARE

## 2020-06-10 DIAGNOSIS — G89.29 CHRONIC PAIN OF LEFT ANKLE: Primary | ICD-10-CM

## 2020-06-10 DIAGNOSIS — M25.572 CHRONIC PAIN OF LEFT ANKLE: Primary | ICD-10-CM

## 2020-06-10 PROCEDURE — 97110 THERAPEUTIC EXERCISES: CPT | Performed by: PHYSICAL THERAPIST

## 2020-06-10 PROCEDURE — 97112 NEUROMUSCULAR REEDUCATION: CPT | Performed by: PHYSICAL THERAPIST

## 2020-06-10 PROCEDURE — 97140 MANUAL THERAPY 1/> REGIONS: CPT | Performed by: PHYSICAL THERAPIST

## 2020-06-12 ENCOUNTER — OFFICE VISIT (OUTPATIENT)
Dept: PHYSICAL THERAPY | Facility: CLINIC | Age: 70
End: 2020-06-12
Payer: MEDICARE

## 2020-06-12 DIAGNOSIS — G89.29 CHRONIC PAIN OF LEFT ANKLE: Primary | ICD-10-CM

## 2020-06-12 DIAGNOSIS — M25.572 CHRONIC PAIN OF LEFT ANKLE: Primary | ICD-10-CM

## 2020-06-12 PROCEDURE — 97112 NEUROMUSCULAR REEDUCATION: CPT | Performed by: PHYSICAL THERAPIST

## 2020-06-12 PROCEDURE — 97140 MANUAL THERAPY 1/> REGIONS: CPT | Performed by: PHYSICAL THERAPIST

## 2020-06-15 ENCOUNTER — APPOINTMENT (OUTPATIENT)
Dept: PHYSICAL THERAPY | Facility: CLINIC | Age: 70
End: 2020-06-15
Payer: MEDICARE

## 2020-06-17 ENCOUNTER — OFFICE VISIT (OUTPATIENT)
Dept: PHYSICAL THERAPY | Facility: CLINIC | Age: 70
End: 2020-06-17
Payer: MEDICARE

## 2020-06-17 DIAGNOSIS — G89.29 CHRONIC PAIN OF LEFT ANKLE: Primary | ICD-10-CM

## 2020-06-17 DIAGNOSIS — M25.572 CHRONIC PAIN OF LEFT ANKLE: Primary | ICD-10-CM

## 2020-06-17 PROCEDURE — 97140 MANUAL THERAPY 1/> REGIONS: CPT | Performed by: PHYSICAL THERAPIST

## 2020-06-17 PROCEDURE — 97110 THERAPEUTIC EXERCISES: CPT | Performed by: PHYSICAL THERAPIST

## 2020-06-18 ENCOUNTER — APPOINTMENT (OUTPATIENT)
Dept: PHYSICAL THERAPY | Facility: CLINIC | Age: 70
End: 2020-06-18
Payer: MEDICARE

## 2020-06-22 ENCOUNTER — APPOINTMENT (OUTPATIENT)
Dept: PHYSICAL THERAPY | Facility: CLINIC | Age: 70
End: 2020-06-22
Payer: MEDICARE

## 2020-06-24 ENCOUNTER — OFFICE VISIT (OUTPATIENT)
Dept: PHYSICAL THERAPY | Facility: CLINIC | Age: 70
End: 2020-06-24
Payer: MEDICARE

## 2020-06-24 DIAGNOSIS — M25.572 CHRONIC PAIN OF LEFT ANKLE: Primary | ICD-10-CM

## 2020-06-24 DIAGNOSIS — G89.29 CHRONIC PAIN OF LEFT ANKLE: Primary | ICD-10-CM

## 2020-06-24 PROCEDURE — 97140 MANUAL THERAPY 1/> REGIONS: CPT | Performed by: PHYSICAL THERAPIST

## 2020-06-24 PROCEDURE — 97110 THERAPEUTIC EXERCISES: CPT | Performed by: PHYSICAL THERAPIST

## 2020-06-26 ENCOUNTER — APPOINTMENT (OUTPATIENT)
Dept: PHYSICAL THERAPY | Facility: CLINIC | Age: 70
End: 2020-06-26
Payer: MEDICARE

## 2020-07-01 ENCOUNTER — OFFICE VISIT (OUTPATIENT)
Dept: PHYSICAL THERAPY | Facility: CLINIC | Age: 70
End: 2020-07-01
Payer: MEDICARE

## 2020-07-01 DIAGNOSIS — G89.29 CHRONIC PAIN OF LEFT ANKLE: Primary | ICD-10-CM

## 2020-07-01 DIAGNOSIS — M25.572 CHRONIC PAIN OF LEFT ANKLE: Primary | ICD-10-CM

## 2020-07-01 PROCEDURE — 97140 MANUAL THERAPY 1/> REGIONS: CPT | Performed by: PHYSICAL THERAPIST

## 2020-07-01 PROCEDURE — 97110 THERAPEUTIC EXERCISES: CPT | Performed by: PHYSICAL THERAPIST

## 2020-07-01 NOTE — PROGRESS NOTES
Daily Note     Today's date: 2020  Patient name: Nirmal Olmedo  : 1950  MRN: [id number]  Referring provider: Jero Abrams MD  Dx:   Encounter Diagnosis     ICD-10-CM    1  Chronic pain of left ankle M25 89     G89 29                   Subjective: last week had a couple episodes where she felt as if her ankle was going to give way      Objective: See treatment diary below      Assessment: Tolerated treatment well  Patient demonstrated fatigue post treatment and would benefit from continued PT      Plan: Continue per plan of care        Precautions: none      Manuals 6/4 6/8 6/10 6/12 6/17 6/24 7/1      jt mobs michael rodriguez      PROM mb michael rodriguez      orthotics  mb           Retrograde massage for swelling     10'        Neuro Re-Ed             SLB  nv 3' 4' declined        Wobble board  nv 30 cw,ccw 30 cw, ccw 30 cw, ccw 30 ea 30                   Moisés stretch  IP  30"z2 30"x2 30"x2 30"x2 30"x2      Sol stretch  IP  floor 30"x2 30"x2 30"x2 30"x2 30"x2      stairs x2  x6   x1       ankle tband   4 ways  Home        Toe crunches             Leg press single heel raise    1 pl 3x10  1pl 30 single 30 dbl 1 pl single & dble 50      bike    10' 10' 10' 10'      Foam tandem       x10      Ther Activity                                       Gait Training                                       Modalities

## 2020-07-06 ENCOUNTER — OFFICE VISIT (OUTPATIENT)
Dept: PHYSICAL THERAPY | Facility: CLINIC | Age: 70
End: 2020-07-06
Payer: MEDICARE

## 2020-07-06 DIAGNOSIS — G89.29 CHRONIC PAIN OF LEFT ANKLE: Primary | ICD-10-CM

## 2020-07-06 DIAGNOSIS — M25.572 CHRONIC PAIN OF LEFT ANKLE: Primary | ICD-10-CM

## 2020-07-06 PROCEDURE — 97112 NEUROMUSCULAR REEDUCATION: CPT | Performed by: PHYSICAL THERAPIST

## 2020-07-06 PROCEDURE — 97140 MANUAL THERAPY 1/> REGIONS: CPT | Performed by: PHYSICAL THERAPIST

## 2020-07-08 ENCOUNTER — APPOINTMENT (OUTPATIENT)
Dept: PHYSICAL THERAPY | Facility: CLINIC | Age: 70
End: 2020-07-08
Payer: MEDICARE

## 2020-07-09 ENCOUNTER — APPOINTMENT (OUTPATIENT)
Dept: PHYSICAL THERAPY | Facility: CLINIC | Age: 70
End: 2020-07-09
Payer: MEDICARE

## 2020-07-14 ENCOUNTER — OFFICE VISIT (OUTPATIENT)
Dept: CARDIOLOGY CLINIC | Facility: CLINIC | Age: 70
End: 2020-07-14
Payer: MEDICARE

## 2020-07-14 VITALS
HEART RATE: 65 BPM | WEIGHT: 219 LBS | DIASTOLIC BLOOD PRESSURE: 80 MMHG | TEMPERATURE: 98.2 F | BODY MASS INDEX: 35.2 KG/M2 | HEIGHT: 66 IN | SYSTOLIC BLOOD PRESSURE: 142 MMHG

## 2020-07-14 DIAGNOSIS — I48.0 PAF (PAROXYSMAL ATRIAL FIBRILLATION) (HCC): Primary | ICD-10-CM

## 2020-07-14 DIAGNOSIS — I10 BENIGN ESSENTIAL HTN: ICD-10-CM

## 2020-07-14 PROCEDURE — 99213 OFFICE O/P EST LOW 20 MIN: CPT | Performed by: INTERNAL MEDICINE

## 2020-07-14 PROCEDURE — 93000 ELECTROCARDIOGRAM COMPLETE: CPT | Performed by: INTERNAL MEDICINE

## 2020-07-14 NOTE — PROGRESS NOTES
Cardiology Follow Up    [facility]  1950  [id number]  [address] [facility] [address]  [phone]    Reason for visit: One year FU for PAF and HTN      1  PAF (paroxysmal atrial fibrillation) (HCC)  POCT ECG   2  Benign essential HTN         Interval History:   Since her last visit she does get intermittent palpitations    She seems to get them under stress  She denies lightheadedness  She does have some LLE edema  She denies Chest pain or SOB       Patient Active Problem List   Diagnosis    PAF (paroxysmal atrial fibrillation) (HCC)    Benign essential HTN    Postablative hypothyroidism    Fall from slipping on ice    Closed fracture of medial malleolus of left tibia    Bimalleolar ankle fracture, left, closed, initial encounter    Peroneal tendonitis of left lower extremity     Past Medical History:   Diagnosis Date    Hypertension      Social History     Socioeconomic History    Marital status: /Civil Union     Spouse name: Not on file    Number of children: Not on file    Years of education: Not on file    Highest education level: Not on file   Occupational History    Not on file   Social Needs    Financial resource strain: Not on file    Food insecurity:     Worry: Not on file     Inability: Not on file    Transportation needs:     Medical: Not on file     Non-medical: Not on file   Tobacco Use    Smoking status: Passive Smoke Exposure - Never Smoker    Smokeless tobacco: Never Used   Substance and Sexual Activity    Alcohol use: No    Drug use: No    Sexual activity: Not on file   Lifestyle    Physical activity:     Days per week: Not on file     Minutes per session: Not on file    Stress: Not on file   Relationships    Social connections:     Talks on phone: Not on file     Gets together: Not on file     Attends Hoahaoism service: Not on file     Active member of club or organization: Not on file     Attends meetings of clubs or organizations: Not on file     Relationship status: Not on file    Intimate partner violence:     Fear of current or ex partner: Not on file     Emotionally abused: Not on file     Physically abused: Not on file     Forced sexual activity: Not on file   Other Topics Concern    Not on file   Social History Narrative    Not on file      Family History   Problem Relation Age of Onset    Lung cancer Family     Coronary artery disease Family     No Known Problems Mother     Lung cancer Father 79    No Known Problems Sister     No Known Problems Daughter     No Known Problems Maternal Grandmother     No Known Problems Maternal Grandfather     No Known Problems Paternal Grandmother     No Known Problems Paternal Grandfather     No Known Problems Maternal Aunt     No Known Problems Maternal Aunt     No Known Problems Maternal Aunt     No Known Problems Maternal Aunt     No Known Problems Paternal Aunt     No Known Problems Paternal Aunt     No Known Problems Paternal Aunt      Past Surgical History:   Procedure Laterality Date    BREAST BIOPSY Right 1996    core bx  benign    BREAST LUMPECTOMY Right 1990    benign    BUNIONECTOMY Left     HAND SURGERY Right     ganglion cyst removal    ORIF ANKLE FRACTURE Right 2005    ORIF TIBIA & FIBULA FRACTURES Left 2/13/2019    Procedure: OPEN REDUCTION W/ INTERNAL FIXATION (ORIF) ANKLE;  Surgeon: Zoila Lujan DO;  Location: [facility];  Service: Orthopedics    THYROIDECTOMY      TONSILLECTOMY AND ADENOIDECTOMY         Current Outpatient Medications:     aspirin (ECOTRIN LOW STRENGTH) 81 mg EC tablet, Take 1 tablet (81 mg total) by mouth daily, Disp: , Rfl:     Cholecalciferol (TH VITAMIN D3) 2000 units CAPS, Take 2,000 Units by mouth daily , Disp: , Rfl:     levothyroxine 100 mcg tablet, Take 100 mcg by mouth daily, Disp: , Rfl: 2    nebivolol (BYSTOLIC) 5 mg tablet, Take 1 tablet (5 mg total) by mouth daily, Disp: 90 tablet, Rfl: 3    omeprazole (PriLOSEC) 20 mg delayed release capsule, Take 1 capsule (20 mg total) by mouth daily, Disp: 90 capsule, Rfl: 3    diclofenac sodium (VOLTAREN) 50 mg EC tablet, Take 1 tablet (50 mg total) by mouth 2 (two) times a day for 5 days, Disp: 10 tablet, Rfl: 0  No Known Allergies      Review of Systems:  Review of Systems   Constitutional: Positive for fatigue  Negative for unexpected weight change  Respiratory: Negative for cough, shortness of breath and wheezing  Cardiovascular: Positive for leg swelling (LLE)  Negative for chest pain  Gastrointestinal: Negative for abdominal pain, blood in stool, constipation and diarrhea  Genitourinary: Positive for frequency  Negative for hematuria  Musculoskeletal: Positive for arthralgias  Negative for back pain  Neurological: Negative for dizziness, light-headedness and headaches  Physical Exam:  Vitals:    07/14/20 1603   BP: 142/80   Pulse: 65   Temp: 98 2 °F (36 8 °C)   Weight: 99 3 kg (219 lb)   Height: 5' 6" (1 676 m)         Physical Exam   Constitutional: She appears well-developed and well-nourished  No distress  obese   HENT:   Head: Normocephalic and atraumatic  Mouth/Throat: Oropharynx is clear and moist  No oropharyngeal exudate  Eyes: Conjunctivae are normal  No scleral icterus  Neck: Neck supple  Normal carotid pulses and no JVD present  Carotid bruit is not present  No thyromegaly present  Cardiovascular: Normal rate, regular rhythm, normal heart sounds and intact distal pulses  Exam reveals no gallop and no friction rub  No murmur heard  Pulmonary/Chest: Breath sounds normal  She has no wheezes  She has no rhonchi  She has no rales  Abdominal: Soft  She exhibits no mass  There is no hepatosplenomegaly  There is no tenderness  Musculoskeletal: She exhibits edema (trace LLE edema)  Discussion/Summary:  1  PAF-relatively quiet at this time    On nebivolol for potential rate control as well as hypertension  Did have a GI bleed full-dose aspirin  Would be higher risk for anticoagulation  Chads Vasc 2 score is 3 but since atrial fibrillation seen to be related to hyperthyroidism will hold on trying anticoagulation  2  Hypertension  For the most part well controlled on the billable all  Continue same      Follow-up 1 year      Shama MD Eliane

## 2020-07-15 ENCOUNTER — OFFICE VISIT (OUTPATIENT)
Dept: PHYSICAL THERAPY | Facility: CLINIC | Age: 70
End: 2020-07-15
Payer: COMMERCIAL

## 2020-07-15 ENCOUNTER — OFFICE VISIT (OUTPATIENT)
Dept: PHYSICAL THERAPY | Facility: CLINIC | Age: 70
End: 2020-07-15
Payer: MEDICARE

## 2020-07-15 DIAGNOSIS — G89.29 CHRONIC PAIN OF LEFT ANKLE: Primary | ICD-10-CM

## 2020-07-15 DIAGNOSIS — M25.572 CHRONIC PAIN OF LEFT ANKLE: Primary | ICD-10-CM

## 2020-07-15 PROCEDURE — 97140 MANUAL THERAPY 1/> REGIONS: CPT | Performed by: PHYSICAL THERAPIST

## 2020-07-15 PROCEDURE — 97110 THERAPEUTIC EXERCISES: CPT | Performed by: PHYSICAL THERAPIST

## 2020-07-15 PROCEDURE — 97112 NEUROMUSCULAR REEDUCATION: CPT | Performed by: PHYSICAL THERAPIST

## 2020-07-15 PROCEDURE — L3010 FOOT LONGITUDINAL ARCH SUPPO: HCPCS | Performed by: PHYSICAL THERAPIST

## 2020-07-15 NOTE — PROGRESS NOTES
Daily Note     Today's date: 7/15/2020  Patient name: Mary Collier  : 1950  MRN: [id number]  Referring provider: Joi Pinedo MD  Dx:   Encounter Diagnosis     ICD-10-CM    1  Chronic pain of left ankle M25 572     G89 29                   Subjective: has been wearing compression stocking which has helped with edema      Objective: See treatment diary below  Noted improved ROM today    Dispensed orthotics, IP in break in period  Pt reported comfort    Assessment: Tolerated treatment well  Patient would benefit from continued PT      Plan: Continue per plan of care        Precautions: none      Manuals 6/4 6/8 6/10 6/12 6/17 6/24 7/1 7/5 7/15    jt mobs mb michael rodriguez mb mb    PROM mb michael rodriguez mb mb    orthotics  mb       x    Retrograde massage for swelling     10'        Neuro Re-Ed             SLB  nv 3' 4' declined        Wobble board  nv 30 cw,ccw 30 cw, ccw 30 cw, ccw 30 ea 30 30 30 cw, ccw                 Moisés stretch  IP  30"z2 30"x2 30"x2 30"x2 30"x2 30"x2 30"x2    Sol stretch  IP  floor 30"x2 30"x2 30"x2 30"x2 30"x2 30"x2 30"x2    stairs x2  x6   x1       ankle tband   4 ways  Home        Toe crunches             Leg press single heel raise    1 pl 3x10  1pl 30 single 30 dbl 1 pl single & dble 50 single x40     bike    10' 10' 10' 10' 10 10'    Foam tandem       x10 x10 x10    Ther Activity             Heel raise         2x10                 Gait Training                                       Modalities

## 2020-07-15 NOTE — PROGRESS NOTES
Daily Note     Today's date: 7/15/2020  Patient name: Mary Collier  : 1950  MRN: [id number]  Referring provider: Joi Pinedo MD  Dx:   Encounter Diagnosis     ICD-10-CM    1  Chronic pain of left ankle M25 572     G89 29                   Subjective: reports improvement      Objective: See treatment diary below      Assessment: Tolerated treatment well  Patient demonstrated fatigue post treatment      Plan: Continue per plan of care        Precautions: none      Manuals 6/4 6/8 6/10 6/12 6/17 6/24 7/1 7/5 7/15    jt mobs mb mb mb mb MD mb mb mb     PROM mb mb michael rodriguez mb     orthotics  mb           Retrograde massage for swelling     10'        Neuro Re-Ed             SLB  nv 3' 4' declined        Wobble board  nv 30 cw,ccw 30 cw, ccw 30 cw, ccw 30 ea 30 30                  Moisés stretch  IP  30"z2 30"x2 30"x2 30"x2 30"x2 30"x2     Sol stretch  IP  floor 30"x2 30"x2 30"x2 30"x2 30"x2 30"x2     stairs x2  x6   x1       ankle tband   4 ways  Home        Toe crunches             Leg press single heel raise    1 pl 3x10  1pl 30 single 30 dbl 1 pl single & dble 50 single x40     bike    10' 10' 10' 10' 10     Foam tandem       x10 x10 x10    Ther Activity             Heel raise         2x10                 Gait Training                                       Modalities

## 2020-07-20 ENCOUNTER — OFFICE VISIT (OUTPATIENT)
Dept: PHYSICAL THERAPY | Facility: CLINIC | Age: 70
End: 2020-07-20
Payer: MEDICARE

## 2020-07-20 DIAGNOSIS — G89.29 CHRONIC PAIN OF LEFT ANKLE: Primary | ICD-10-CM

## 2020-07-20 DIAGNOSIS — M25.572 CHRONIC PAIN OF LEFT ANKLE: Primary | ICD-10-CM

## 2020-07-20 PROCEDURE — 97110 THERAPEUTIC EXERCISES: CPT | Performed by: PHYSICAL THERAPIST

## 2020-07-20 PROCEDURE — 97140 MANUAL THERAPY 1/> REGIONS: CPT | Performed by: PHYSICAL THERAPIST

## 2020-07-20 PROCEDURE — 97112 NEUROMUSCULAR REEDUCATION: CPT | Performed by: PHYSICAL THERAPIST

## 2020-07-20 NOTE — PROGRESS NOTES
Daily Note     Today's date: 2020  Patient name: Mandy Dent  : 1950  MRN: [id number]  Referring provider: Sharri Lundberg MD  Dx:   Encounter Diagnosis     ICD-10-CM    1  Chronic pain of left ankle M25 572     G89 29                   Subjective: overall feeling better      Objective: See treatment diary below      Assessment: Tolerated treatment well  Patient would benefit from continued PT      Plan: Continue per plan of care        Precautions: none      Manuals 6/4 6/8 6/10 6/12 6/17 6/24 7/1 7/5 7/15 7/20   jt mobs mb mb mb mb MD mb mb mb mb mb   PROM mb mb mb mb MD mb mb mb mb mb   orthotics  mb       x    Retrograde massage for swelling     10'        Neuro Re-Ed             SLB  nv 3' 4' declined        Wobble board  nv 30 cw,ccw 30 cw, ccw 30 cw, ccw 30 ea 30 30 30 cw, ccw 30 cw, ccw                Moisés stretch  IP  30"z2 30"x2 30"x2 30"x2 30"x2 30"x2 30"x2 30"x3   Sol stretch  IP  floor 30"x2 30"x2 30"x2 30"x2 30"x2 30"x2 30"x2 30"x3   stairs x2  x6   x1       ankle tband   4 ways  Home        Toe crunches             Leg press single heel raise    1 pl 3x10  1pl 30 single 30 dbl 1 pl single & dble 50 single x40 1 pl single x40 1 pl single x40   bike    10' 10' 10' 10' 10 10' 10'   Foam tandem       x10 x10 x10 10   Ther Activity             Heel raise         2x10 2x10                Gait Training                                       Modalities

## 2020-07-22 ENCOUNTER — APPOINTMENT (OUTPATIENT)
Dept: PHYSICAL THERAPY | Facility: CLINIC | Age: 70
End: 2020-07-22
Payer: MEDICARE

## 2020-07-23 ENCOUNTER — OFFICE VISIT (OUTPATIENT)
Dept: PHYSICAL THERAPY | Facility: CLINIC | Age: 70
End: 2020-07-23
Payer: MEDICARE

## 2020-07-23 DIAGNOSIS — G89.29 CHRONIC PAIN OF LEFT ANKLE: Primary | ICD-10-CM

## 2020-07-23 DIAGNOSIS — M25.572 CHRONIC PAIN OF LEFT ANKLE: Primary | ICD-10-CM

## 2020-07-23 PROCEDURE — 97112 NEUROMUSCULAR REEDUCATION: CPT | Performed by: PHYSICAL THERAPIST

## 2020-07-23 PROCEDURE — 97110 THERAPEUTIC EXERCISES: CPT | Performed by: PHYSICAL THERAPIST

## 2020-07-23 PROCEDURE — 97140 MANUAL THERAPY 1/> REGIONS: CPT | Performed by: PHYSICAL THERAPIST

## 2020-07-23 NOTE — PROGRESS NOTES
Daily Note     Today's date: 2020  Patient name: Breezy Huynh  : 1950  MRN: [id number]  Referring provider: Parris Corrales MD  Dx:   Encounter Diagnosis     ICD-10-CM    1  Chronic pain of left ankle M25 572     G89 29                   Subjective: ankle is feeling pretty good      Objective: See treatment diary below      Assessment: Tolerated treatment well  Patient demonstrated fatigue post treatment      Plan: Continue per plan of care        Precautions: none      Manuals    jt mobs mb   PROM mb   orthotics    Retrograde massage for swelling    Neuro Re-Ed    SLB    Wobble board 30 cw, ccw       Moisés stretch  30"x3   Sol stretch  30"x3   stairs    ankle tband    Toe crunches    Leg press single heel raise 1 pl single x30   bike 10'   Foam tandem 10   Ther Activity    Heel raise 2x10       Gait Training            Modalities

## 2020-07-24 ENCOUNTER — APPOINTMENT (OUTPATIENT)
Dept: PHYSICAL THERAPY | Facility: CLINIC | Age: 70
End: 2020-07-24
Payer: MEDICARE

## 2020-07-29 ENCOUNTER — APPOINTMENT (OUTPATIENT)
Dept: PHYSICAL THERAPY | Facility: CLINIC | Age: 70
End: 2020-07-29
Payer: MEDICARE

## 2020-07-30 ENCOUNTER — APPOINTMENT (OUTPATIENT)
Dept: PHYSICAL THERAPY | Facility: CLINIC | Age: 70
End: 2020-07-30
Payer: MEDICARE

## 2020-08-05 ENCOUNTER — OFFICE VISIT (OUTPATIENT)
Dept: PHYSICAL THERAPY | Facility: CLINIC | Age: 70
End: 2020-08-05
Payer: MEDICARE

## 2020-08-05 DIAGNOSIS — G89.29 CHRONIC PAIN OF LEFT ANKLE: Primary | ICD-10-CM

## 2020-08-05 DIAGNOSIS — M25.572 CHRONIC PAIN OF LEFT ANKLE: Primary | ICD-10-CM

## 2020-08-05 PROCEDURE — 97112 NEUROMUSCULAR REEDUCATION: CPT | Performed by: PHYSICAL THERAPIST

## 2020-08-05 PROCEDURE — 97110 THERAPEUTIC EXERCISES: CPT | Performed by: PHYSICAL THERAPIST

## 2020-08-05 PROCEDURE — 97140 MANUAL THERAPY 1/> REGIONS: CPT | Performed by: PHYSICAL THERAPIST

## 2020-08-05 NOTE — PROGRESS NOTES
Daily Note     Today's date: 2020  Patient name: Kayode Sprain  : 1950  MRN: [id number]  Referring provider: Lyndsey Navarro MD  Dx:   Encounter Diagnosis     ICD-10-CM    1  Chronic pain of left ankle  M25 572     G89 29                   Subjective: reports that ankle is feeling much better    Objective: See treatment diary below      Assessment: Tolerated treatment well  Patient would benefit from continued PT      Plan: Continue per plan of care        Precautions: none      Manuals    jt mobs mb   PROM mb   orthotics    Retrograde massage for swelling    Neuro Re-Ed    SLB    Wobble board 30 cw, ccw       Moisés stretch  30"x3   Sol stretch  30"x3   stairs    ankle tband    Toe crunches    Leg press single heel raise 1 pl single x30   bike 10'   Foam tandem 10   Ther Activity    Heel raise 2x10       Gait Training            Modalities

## 2020-08-07 ENCOUNTER — APPOINTMENT (OUTPATIENT)
Dept: PHYSICAL THERAPY | Facility: CLINIC | Age: 70
End: 2020-08-07
Payer: MEDICARE

## 2020-08-10 ENCOUNTER — APPOINTMENT (OUTPATIENT)
Dept: PHYSICAL THERAPY | Facility: CLINIC | Age: 70
End: 2020-08-10
Payer: MEDICARE

## 2020-08-12 ENCOUNTER — OFFICE VISIT (OUTPATIENT)
Dept: PHYSICAL THERAPY | Facility: CLINIC | Age: 70
End: 2020-08-12
Payer: MEDICARE

## 2020-08-12 DIAGNOSIS — G89.29 CHRONIC PAIN OF LEFT ANKLE: Primary | ICD-10-CM

## 2020-08-12 DIAGNOSIS — M25.572 CHRONIC PAIN OF LEFT ANKLE: Primary | ICD-10-CM

## 2020-08-12 PROCEDURE — 97140 MANUAL THERAPY 1/> REGIONS: CPT | Performed by: PHYSICAL THERAPIST

## 2020-08-12 PROCEDURE — 97110 THERAPEUTIC EXERCISES: CPT | Performed by: PHYSICAL THERAPIST

## 2020-08-12 NOTE — PROGRESS NOTES
Daily Note     Today's date: 2020  Patient name: Cherelle Kee  : 1950  MRN: [id number]  Referring provider: Johnny Carson MD  Dx:   Encounter Diagnosis     ICD-10-CM    1  Chronic pain of left ankle  M25 572     G89 29                   Subjective: pt did a lot of house work and stairs last Friday and felt significant discomfort for the rest of the weekend and has persisted  Present pain is 4/10        Objective: See treatment diary below  Gait is slightly antalgic  No increased edema noted  Pain with ff eversion    Assessment: Tolerated treatment well  Patient would benefit from continued PT      Plan: Continue per plan of care        Precautions: none      Manuals     jt mobs mb mb   PROM mb mb   orthotics     Retrograde massage for swelling     Neuro Re-Ed     SLB     Wobble board  30 cw, ccw        Moisés stretch   30"x3   Sol stretch   30"x3   stairs     ankle tband     Toe crunches     Leg press single heel raise  1 pl single x30   bike 10' 10'   Foam tandem  10        Heel raise  2x10   Toe splay 5' '   Toe yoga 3' ea    Gait Training               Modalities

## 2020-08-17 ENCOUNTER — OFFICE VISIT (OUTPATIENT)
Dept: PHYSICAL THERAPY | Facility: CLINIC | Age: 70
End: 2020-08-17
Payer: MEDICARE

## 2020-08-17 DIAGNOSIS — M25.572 CHRONIC PAIN OF LEFT ANKLE: Primary | ICD-10-CM

## 2020-08-17 DIAGNOSIS — G89.29 CHRONIC PAIN OF LEFT ANKLE: Primary | ICD-10-CM

## 2020-08-17 PROCEDURE — 97140 MANUAL THERAPY 1/> REGIONS: CPT | Performed by: PHYSICAL THERAPIST

## 2020-08-17 PROCEDURE — 97112 NEUROMUSCULAR REEDUCATION: CPT | Performed by: PHYSICAL THERAPIST

## 2020-08-17 PROCEDURE — 97110 THERAPEUTIC EXERCISES: CPT | Performed by: PHYSICAL THERAPIST

## 2020-08-17 NOTE — PROGRESS NOTES
Daily Note     Today's date: 2020  Patient name: Ania Galicia  : 1950  MRN: [id number]  Referring provider: Phil Hough MD  Dx:   Encounter Diagnosis     ICD-10-CM    1  Chronic pain of left ankle  M25 572     G89 29                   Subjective: pt reports overall progress but feels tightness in lat aspect of foot with occasional sharp pain  Objective: See treatment diary below  Limited inversion ROM, improved with stretching    Assessment: Tolerated treatment well  Patient demonstrated fatigue post treatment      Plan: Continue per plan of care        Precautions: none      Manuals    jt mobs mb mb   PROM mb mb   orthotics     Retrograde massage for swelling     Neuro Re-Ed     biodex catch  4'   Wobble board  30 cw, ccw        Moisés stretch   30"x3   Sol stretch   30"x3   stairs     ankle tband     Toe crunches     Leg press single heel raise  1 pl single x30   bike 10' 10'   Foam tandem  10        Heel raise  3x10   Toe splay 5' '   Toe yoga 3' ea    Gait Training               Modalities

## 2020-08-24 ENCOUNTER — OFFICE VISIT (OUTPATIENT)
Dept: PHYSICAL THERAPY | Facility: CLINIC | Age: 70
End: 2020-08-24
Payer: MEDICARE

## 2020-08-24 DIAGNOSIS — M25.572 CHRONIC PAIN OF LEFT ANKLE: Primary | ICD-10-CM

## 2020-08-24 DIAGNOSIS — G89.29 CHRONIC PAIN OF LEFT ANKLE: Primary | ICD-10-CM

## 2020-08-24 PROCEDURE — 97140 MANUAL THERAPY 1/> REGIONS: CPT | Performed by: PHYSICAL THERAPIST

## 2020-08-24 PROCEDURE — 97110 THERAPEUTIC EXERCISES: CPT | Performed by: PHYSICAL THERAPIST

## 2020-08-24 PROCEDURE — 97112 NEUROMUSCULAR REEDUCATION: CPT | Performed by: PHYSICAL THERAPIST

## 2020-08-24 NOTE — PROGRESS NOTES
Daily Note     Today's date: 2020  Patient name: Netta Ludwig  : 1950  MRN: [id number]  Referring provider: Jenny Ba MD  Dx:   Encounter Diagnosis     ICD-10-CM    1  Chronic pain of left ankle  M25 572     G89 29                   Subjective: pt reports that she had a very busy couple of days, on her feet more than usual and had increased pain in her ankle  Was feeling better up until that point  Objective: See treatment diary below      Assessment: Tolerated treatment well  Patient would benefit from continued PT      Plan: Continue per plan of care      Pt to f/u with Dr Brandy Howard  Precautions: none      Manuals    jt mobs michael rodriguez   PROM michael rodriguez   orthotics      Retrograde massage for swelling      Neuro Re-Ed      biodex catch  4' 4'   Wobble board  30 cw, ccw 30 ea         Moisés stretch   30"x3    Sol stretch   30"x3    stairs      ankle tband      Toe crunches      Leg press single heel raise  1 pl single x30 Pt declined   bike 10' 10' 10   Foam tandem  10          Heel raise  3x10 pain   Toe splay 5' '    Toe yoga 3' ea     Gait Training                  Modalities

## 2020-08-25 ENCOUNTER — OFFICE VISIT (OUTPATIENT)
Dept: OBGYN CLINIC | Facility: CLINIC | Age: 70
End: 2020-08-25
Payer: MEDICARE

## 2020-08-25 VITALS
DIASTOLIC BLOOD PRESSURE: 85 MMHG | BODY MASS INDEX: 34.06 KG/M2 | WEIGHT: 217 LBS | HEART RATE: 71 BPM | HEIGHT: 67 IN | SYSTOLIC BLOOD PRESSURE: 120 MMHG

## 2020-08-25 DIAGNOSIS — M19.072 ARTHRITIS OF LEFT SUBTALAR JOINT: Primary | ICD-10-CM

## 2020-08-25 PROCEDURE — 99213 OFFICE O/P EST LOW 20 MIN: CPT | Performed by: ORTHOPAEDIC SURGERY

## 2020-08-25 PROCEDURE — 20600 DRAIN/INJ JOINT/BURSA W/O US: CPT | Performed by: ORTHOPAEDIC SURGERY

## 2020-08-25 RX ORDER — TRIAMCINOLONE ACETONIDE 40 MG/ML
40 INJECTION, SUSPENSION INTRA-ARTICULAR; INTRAMUSCULAR
Status: COMPLETED | OUTPATIENT
Start: 2020-08-25 | End: 2020-08-25

## 2020-08-25 RX ORDER — LIDOCAINE HYDROCHLORIDE 10 MG/ML
5 INJECTION, SOLUTION EPIDURAL; INFILTRATION; INTRACAUDAL; PERINEURAL
Status: COMPLETED | OUTPATIENT
Start: 2020-08-25 | End: 2020-08-25

## 2020-08-25 RX ADMIN — LIDOCAINE HYDROCHLORIDE 5 ML: 10 INJECTION, SOLUTION EPIDURAL; INFILTRATION; INTRACAUDAL; PERINEURAL at 14:55

## 2020-08-25 RX ADMIN — TRIAMCINOLONE ACETONIDE 40 MG: 40 INJECTION, SUSPENSION INTRA-ARTICULAR; INTRAMUSCULAR at 14:55

## 2020-08-25 NOTE — PATIENT INSTRUCTIONS
During your appointment today, we injected your subtalar joint with a pain medication and steroids  It is common to immediately feel pain relief after the injection  This is temporary as the pain medication in the injection can wear off in 4-8 hours  Often, the pain returns later in the day and this is normal   The steroid can take 24-48 hours to kick in  Common complaints;  1  Pain at the injection site (like a bee sting)- use ice for 20 minutes at a time, taking 40 minutes break between ice sessions, to alleviate this discomfort  2  Skin discoloration- this is a common side-effect of the steroid and can be a permanent skin color change  3  Blood sugar elevation- occasionally, steroid injections can effect blood sugar in diabetic patients  We recommend monitoring your blood sugar over the next 3 days if you are diabetic  If the injection works, even if only for 20 minutes, it means that we have isolated the pain generator (i e  The injection was diagnositic)  The hope is that the injection provides prolonged pain relief (i e  The injection was therapeutic)  If you get better but the pain returns, it is a good sign that a surgery to correct your problem is very likely to eliminate the pain permanently

## 2020-08-25 NOTE — PROGRESS NOTES
Arlys Cogan, M D  Attending, Orthopaedic Surgery  [facility] [address] [facility]      ORTHOPAEDIC FOOT AND ANKLE CLINIC VISIT     Assessment:     Encounter Diagnosis   Name Primary?  Arthritis of left subtalar joint Yes            Plan:   · The patient verbalized understanding of exam findings and treatment plan  We engaged in the shared decision-making process and treatment options were discussed at length with the patient  Surgical and conservative management discussed today along with risks and benefits  · Nestor Nava has pain in the lateral ankle in the area of the subtalar joint  She has rigid subtalar motion on examination and she has subtalar joint arthritis on xray  · Given injection into the subtalar joint today  See procedure note below   · If the injection provides relief, we know the subtalar arthritis is the source of her pain  If she has no relief with the injection, this may be due to painful hardware  It is possible that the lag screw in the fibula is too long and irritating her peroneal tendons  Return if symptoms worsen or fail to improve  We will see her back when the injection wears off  History of Present Illness:   Chief Complaint:   Chief Complaint   Patient presents with   [address] [facility]  [address] is a 81 y o  female who is being seen in follow-up for left lateral ankle pain  When we last saw she we recommended PT for peroneal tendinitis  Pain has minimally improved  Residual pain is localized at lateral ankle, subtalar joint with minimal radiating and described as sharp and severe        Pain/symptom timing:  Worse during the day when active  Pain/symptom context:  Worse with activites and work  Pain/symptom modifying factors:  Rest makes better, activities make worse  Pain/symptom associated signs/symptoms: none    Prior treatment   · NSAIDsYes   · Injections No   · Bracing/Orthotics Yes    · Physical Therapy Yes     Orthopedic Surgical History:   Previous bilateral bunionectomy as well as ORIF of right bimalleolar ankle fracture     Past Medical, Surgical and Social History:  Past Medical History:  has a past medical history of Hypertension  Problem List: does not have any pertinent problems on file  Past Surgical History:  has a past surgical history that includes Thyroidectomy; ORIF tibia & fibula fractures (Left, 2/13/2019); Hand surgery (Right); Tonsillectomy and adenoidectomy; ORIF ankle fracture (Right, 2005); Bunionectomy (Left); Breast lumpectomy (Right, 1990); and Breast biopsy (Right, 1996)  Family History: family history includes Coronary artery disease in her family; Lung cancer in her family; Lung cancer (age of onset: 79) in her father; No Known Problems in her daughter, maternal aunt, maternal aunt, maternal aunt, maternal aunt, maternal grandfather, maternal grandmother, mother, paternal aunt, paternal aunt, paternal aunt, paternal grandfather, paternal grandmother, and sister  Social History:  reports that she is a non-smoker but has been exposed to tobacco smoke  She has never used smokeless tobacco  She reports that she does not drink alcohol or use drugs  Current Medications: has a current medication list which includes the following prescription(s): aspirin, cholecalciferol, diclofenac sodium, levothyroxine, nebivolol, and omeprazole  Allergies: has No Known Allergies       Review of Systems:  General- denies fever/chills  HEENT- denies hearing loss or sore throat  Eyes- denies eye pain or visual disturbances, denies red eyes  Respiratory- denies cough or SOB  Cardio- denies chest pain or palpitations  GI- denies abdominal pain  Endocrine- denies urinary frequency  Urinary- denies pain with urination  Musculoskeletal- Negative except noted above  Skin- denies rashes or wounds  Neurological- denies dizziness or headache  Psychiatric- denies anxiety or difficulty concentrating    Physical Exam:   /85   Pulse 71    5' 6 5" (1 689 m)   Wt 98 4 kg (217 lb)   LMP  (LMP Unknown)   BMI 34 50 kg/m²   General/Constitutional: No apparent distress: well-nourished and well developed  Eyes: normal ocular motion  Lymphatic: No appreciable lymphadenopathy  Respiratory: Non-labored breathing  Vascular: No edema, swelling or tenderness, except as noted in detailed exam   Integumentary: No impressive skin lesions present, except as noted in detailed exam   Neuro: No ataxia or tremors noted  Psych: Normal mood and affect, oriented to person, place and time  Appropriate affect  Musculoskeletal: Normal, except as noted in detailed exam and in HPI  Examination    Left    Gait Antalgic   Musculoskeletal Tender to palpation at lateral ankle at the subtalar joint    Skin Normal   Well-healed incisions  Nails Normal    Range of Motion  20 degrees dorsiflexion, 40 degrees plantarflexion  Subtalar motion: rigid subtalar motion    Stability Stable    Muscle Strength 5/5 tibialis anterior  5/5 gastrocnemius-soleus  5/5 posterior tibialis  5/5 peroneal/eversion strength  5/5 EHL  5/5 FHL    Neurologic Normal    Sensation  Intact to light touch throughout sural, saphenous, superficial peroneal, deep peroneal and medial/lateral plantar nerve distributions  [place] 5 07 filament (10g) testing  deferred      Cardiovascular Brisk capillary refill < 2 seconds,intact DP and PT pulses    Special Tests None      Imaging Studies:   No new imaging      Small joint arthrocentesis: L subtalar  Date/Time: 8/25/2020 2:55 PM  Consent given by: patient  Site marked: site marked  Timeout: Immediately prior to procedure a time out was called to verify the correct patient, procedure, equipment, support staff and site/side marked as required   Supporting Documentation  Indications: pain   Procedure Details  Location: foot - L subtalar  Preparation: Patient was prepped and draped in the usual sterile fashion  Needle size: 22 G  Ultrasound guidance: no  Approach: lateral  Medications administered: 5 mL lidocaine (PF) 1 %; 40 mg triamcinolone acetonide 40 mg/mL    Patient tolerance: patient tolerated the procedure well with no immediate complications  Dressing:  Sterile dressing applied          Scribe Attestation    I,:   Jase Rincon PA-C am acting as a scribe while in the presence of the attending physician :        I,:   Toño Souza MD personally performed the services described in this documentation    as scribed in my presence :                Arlyne Covert Lachman, MD  Foot & Ankle Surgery   Department [address]      I personally performed the service  Arlyne Covert Lachman, MD

## 2020-08-26 ENCOUNTER — APPOINTMENT (OUTPATIENT)
Dept: PHYSICAL THERAPY | Facility: CLINIC | Age: 70
End: 2020-08-26
Payer: MEDICARE

## 2020-09-01 ENCOUNTER — OFFICE VISIT (OUTPATIENT)
Dept: PHYSICAL THERAPY | Facility: CLINIC | Age: 70
End: 2020-09-01
Payer: MEDICARE

## 2020-09-01 DIAGNOSIS — G89.29 CHRONIC PAIN OF LEFT ANKLE: Primary | ICD-10-CM

## 2020-09-01 DIAGNOSIS — M25.572 CHRONIC PAIN OF LEFT ANKLE: Primary | ICD-10-CM

## 2020-09-01 PROCEDURE — 97530 THERAPEUTIC ACTIVITIES: CPT | Performed by: PHYSICAL THERAPIST

## 2020-09-01 PROCEDURE — 97110 THERAPEUTIC EXERCISES: CPT | Performed by: PHYSICAL THERAPIST

## 2020-09-01 PROCEDURE — 97140 MANUAL THERAPY 1/> REGIONS: CPT | Performed by: PHYSICAL THERAPIST

## 2020-09-01 NOTE — PROGRESS NOTES
Daily Note     Today's date: 2020  Patient name: Rip Bruce  : 1950  MRN: [id number]  Referring provider: Pia Higuera MD  Dx:   Encounter Diagnosis     ICD-10-CM    1  Chronic pain of left ankle  M25 572     G89 29                   Subjective: pt received injection to STJ last week  She reports noticing relief although some pain persists, byron after being on her feet all day at work  Present pain 0/10  Last night after work 8/10      Objective: See treatment diary below    Noted decreased edema  Assessment: Tolerated treatment well  Patient demonstrated fatigue post treatment      Plan: f/u in 2 wks to discuss treatment options         Precautions: none      Manuals    jt mobs michael rodriguez mb   PROM michael rodriguez mb   orthotics       Retrograde massage for swelling       Neuro Re-Ed       biodex catch  4' 4'    Wobble board  30 cw, ccw 30 ea           Moisés stretch   30"x3  30"   Sol stretch   30"x3  30"   stairs       ankle tband    g 2x30   Toe crunches       Leg press single heel raise  1 pl single x30 Pt declined    bike 10' 10' 10 10'   Foam tandem  10     AROM    30 ea   Heel raise  3x10 pain    Toe splay 5' '     Toe yoga 3' ea      Gait Training                     Modalities

## 2020-09-17 DIAGNOSIS — I10 BENIGN ESSENTIAL HTN: ICD-10-CM

## 2020-09-18 ENCOUNTER — APPOINTMENT (OUTPATIENT)
Dept: PHYSICAL THERAPY | Facility: CLINIC | Age: 70
End: 2020-09-18
Payer: MEDICARE

## 2020-09-18 RX ORDER — NEBIVOLOL HYDROCHLORIDE 5 MG/1
TABLET ORAL
Qty: 90 TABLET | Refills: 3 | Status: SHIPPED | OUTPATIENT
Start: 2020-09-18 | End: 2021-10-13

## 2020-09-29 ENCOUNTER — APPOINTMENT (OUTPATIENT)
Dept: PHYSICAL THERAPY | Facility: CLINIC | Age: 70
End: 2020-09-29
Payer: MEDICARE

## 2020-12-12 DIAGNOSIS — K21.9 GASTROESOPHAGEAL REFLUX DISEASE WITHOUT ESOPHAGITIS: ICD-10-CM

## 2020-12-12 RX ORDER — OMEPRAZOLE 20 MG/1
CAPSULE, DELAYED RELEASE ORAL
Qty: 90 CAPSULE | Refills: 3 | Status: SHIPPED | OUTPATIENT
Start: 2020-12-12 | End: 2021-12-06

## 2021-01-08 ENCOUNTER — IMMUNIZATIONS (OUTPATIENT)
Dept: FAMILY MEDICINE CLINIC | Facility: HOSPITAL | Age: 71
End: 2021-01-08

## 2021-01-08 DIAGNOSIS — Z23 ENCOUNTER FOR IMMUNIZATION: ICD-10-CM

## 2021-01-08 PROCEDURE — 91300 SARS-COV-2 / COVID-19 MRNA VACCINE (PFIZER-BIONTECH) 30 MCG: CPT

## 2021-01-08 PROCEDURE — 0001A SARS-COV-2 / COVID-19 MRNA VACCINE (PFIZER-BIONTECH) 30 MCG: CPT

## 2021-01-12 ENCOUNTER — TELEPHONE (OUTPATIENT)
Dept: CARDIOLOGY CLINIC | Facility: CLINIC | Age: 71
End: 2021-01-12

## 2021-01-15 NOTE — TELEPHONE ENCOUNTER
Received denial due to not trying other meds first   I sent a note with office visit notes, stating that this is not a new prescription but a renewal   Patient has been on this med since 2015  Waiting on new response

## 2021-01-26 NOTE — TELEPHONE ENCOUNTER
Bystolic approved through [facility]    Auth# [id number]  1/12/21 through 12/31/21    Pharmacy notified

## 2021-01-28 ENCOUNTER — IMMUNIZATIONS (OUTPATIENT)
Dept: FAMILY MEDICINE CLINIC | Facility: HOSPITAL | Age: 71
End: 2021-01-28

## 2021-01-28 DIAGNOSIS — Z23 ENCOUNTER FOR IMMUNIZATION: Primary | ICD-10-CM

## 2021-01-28 PROCEDURE — 0002A SARS-COV-2 / COVID-19 MRNA VACCINE (PFIZER-BIONTECH) 30 MCG: CPT

## 2021-01-28 PROCEDURE — 91300 SARS-COV-2 / COVID-19 MRNA VACCINE (PFIZER-BIONTECH) 30 MCG: CPT

## 2021-07-01 DIAGNOSIS — Z12.31 ENCOUNTER FOR SCREENING MAMMOGRAM FOR MALIGNANT NEOPLASM OF BREAST: Primary | ICD-10-CM

## 2021-07-20 ENCOUNTER — OFFICE VISIT (OUTPATIENT)
Dept: CARDIOLOGY CLINIC | Facility: CLINIC | Age: 71
End: 2021-07-20
Payer: MEDICARE

## 2021-07-20 VITALS
WEIGHT: 220.2 LBS | HEART RATE: 73 BPM | BODY MASS INDEX: 34.56 KG/M2 | RESPIRATION RATE: 16 BRPM | DIASTOLIC BLOOD PRESSURE: 82 MMHG | HEIGHT: 67 IN | SYSTOLIC BLOOD PRESSURE: 122 MMHG

## 2021-07-20 DIAGNOSIS — I48.0 PAF (PAROXYSMAL ATRIAL FIBRILLATION) (HCC): Primary | ICD-10-CM

## 2021-07-20 DIAGNOSIS — I10 BENIGN ESSENTIAL HTN: ICD-10-CM

## 2021-07-20 PROCEDURE — 93000 ELECTROCARDIOGRAM COMPLETE: CPT | Performed by: INTERNAL MEDICINE

## 2021-07-20 PROCEDURE — 99213 OFFICE O/P EST LOW 20 MIN: CPT | Performed by: INTERNAL MEDICINE

## 2021-07-20 RX ORDER — MULTIVIT-MIN/IRON FUM/FOLIC AC 7.5 MG-4
1 TABLET ORAL DAILY
COMMUNITY

## 2021-07-20 NOTE — PROGRESS NOTES
Cardiology Follow Up    Jolie Bhardwaj  1950  [id number]  [address] [facility] [address]  [phone]  [phone]    Reason for visit: One year FU for PAF and HTN    1  PAF (paroxysmal atrial fibrillation) (HCC)  POCT ECG   2  Benign essential HTN         Interval History: Since her last visit she did have palpitations for hours in February and then a few months later    It began when she went to bed and lasted into the morning  She did not feel there was a racing  She did consume more alcohol than usual prior to the last episode  She denies dyspnea or lightheadedness  She denies chest Pain    She does have ongoing LLE         Patient Active Problem List   Diagnosis    PAF (paroxysmal atrial fibrillation) (HCC)    Benign essential HTN    Postablative hypothyroidism    Fall from slipping on ice    Closed fracture of medial malleolus of left tibia    Bimalleolar ankle fracture, left, closed, initial encounter    Peroneal tendonitis of left lower extremity     Past Medical History:   Diagnosis Date    Hypertension      Social History     Socioeconomic History    Marital status: /Civil Union     Spouse name: Not on file    Number of children: Not on file    Years of education: Not on file    Highest education level: Not on file   Occupational History    Not on file   Tobacco Use    Smoking status: Passive Smoke Exposure - Never Smoker    Smokeless tobacco: Never Used   Substance and Sexual Activity    Alcohol use: No    Drug use: No    Sexual activity: Not on file   Other Topics Concern    Not on file   Social History Narrative    Not on file     Social Determinants of Health     Financial Resource Strain:     Difficulty of Paying Living Expenses:    Food Insecurity:     Worried About Running Out of Food in the Last Year:     Alexander of Food in the Last Year:    Transportation Needs:     Lack of Transportation (Medical):      Lack of Transportation (Non-Medical):    Physical Activity:     Days of Exercise per Week:     Minutes of Exercise per Session:    Stress:     Feeling of Stress :    Social Connections:     Frequency of Communication with Friends and Family:     Frequency of Social Gatherings with Friends and Family:     Attends Buddhist Services:     Active Member of Clubs or Organizations:     Attends Club or Organization Meetings:     Marital Status:    Intimate Partner Violence:     Fear of Current or Ex-Partner:     Emotionally Abused:     Physically Abused:     Sexually Abused:       Family History   Problem Relation Age of Onset    Lung cancer Family     Coronary artery disease Family     No Known Problems Mother     Lung cancer Father 79    No Known Problems Sister     No Known Problems Daughter     No Known Problems Maternal Grandmother     No Known Problems Maternal Grandfather     No Known Problems Paternal Grandmother     No Known Problems Paternal Grandfather     No Known Problems Maternal Aunt     No Known Problems Maternal Aunt     No Known Problems Maternal Aunt     No Known Problems Maternal Aunt     No Known Problems Paternal Aunt     No Known Problems Paternal Aunt     No Known Problems Paternal Aunt      Past Surgical History:   Procedure Laterality Date    BREAST BIOPSY Right 1996    core bx  benign    BREAST LUMPECTOMY Right 1990    benign    BUNIONECTOMY Left     HAND SURGERY Right     ganglion cyst removal    ORIF ANKLE FRACTURE Right 2005    ORIF TIBIA & FIBULA FRACTURES Left 2/13/2019    Procedure: OPEN REDUCTION W/ INTERNAL FIXATION (ORIF) ANKLE;  Surgeon: Seng Kitchen DO;  Location: [facility];  Service: Orthopedics    THYROIDECTOMY      TONSILLECTOMY AND ADENOIDECTOMY         Current Outpatient Medications:     aspirin (ECOTRIN LOW STRENGTH) 81 mg EC tablet, Take 1 tablet (81 mg total) by mouth daily, Disp: , Rfl:     Bystolic 5 MG tablet, TAKE 1 TABLET(5 MG) BY MOUTH DAILY, Disp: 90 tablet, Rfl: 3    Cholecalciferol (TH VITAMIN D3) 2000 units CAPS, Take 2,000 Units by mouth daily , Disp: , Rfl:     levothyroxine 100 mcg tablet, Take 100 mcg by mouth daily, Disp: , Rfl: 2    Multiple Vitamins-Minerals (multivitamin with minerals) tablet, Take 1 tablet by mouth daily, Disp: , Rfl:     omeprazole (PriLOSEC) 20 mg delayed release capsule, TAKE 1 CAPSULE(20 MG) BY MOUTH DAILY, Disp: 90 capsule, Rfl: 3    diclofenac sodium (VOLTAREN) 50 mg EC tablet, Take 1 tablet (50 mg total) by mouth 2 (two) times a day for 5 days, Disp: 10 tablet, Rfl: 0  No Known Allergies    Review of Systems:  Review of Systems   Constitutional: Positive for fatigue  Negative for activity change, appetite change and unexpected weight change  Respiratory: Negative for cough, shortness of breath and wheezing  Cardiovascular: Positive for palpitations  Negative for chest pain and leg swelling  Gastrointestinal: Negative for abdominal pain, blood in stool, constipation and diarrhea  Genitourinary: Positive for frequency  Negative for dysuria and hematuria  Musculoskeletal: Positive for arthralgias  Neurological: Negative for dizziness and light-headedness  Physical Exam:  Vitals:    07/20/21 1313   BP: 122/82   Pulse: 73   Resp: 16   Weight: 99 9 kg (220 lb 3 2 oz)   Height: 5' 6 5" (1 689 m)       Physical Exam  Constitutional:       General: She is not in acute distress  Appearance: She is obese  She is not ill-appearing  HENT:      Head: Normocephalic and atraumatic  Mouth/Throat:      Mouth: Mucous membranes are moist       Pharynx: No oropharyngeal exudate or posterior oropharyngeal erythema  Eyes:      General: No scleral icterus  Conjunctiva/sclera: Conjunctivae normal    Neck:      Thyroid: No thyroid mass or thyromegaly  Vascular: Normal carotid pulses  No carotid bruit or JVD     Cardiovascular:      Rate and Rhythm: Normal rate and regular rhythm  Pulses: Normal pulses  Heart sounds: No murmur heard  No friction rub  No gallop  Pulmonary:      Breath sounds: Normal breath sounds  No wheezing, rhonchi or rales  Abdominal:      Palpations: Abdomen is soft  There is no hepatomegaly, splenomegaly or mass  Tenderness: There is no abdominal tenderness  Musculoskeletal:         General: Swelling (trace to +1 edema L>RLE) present  Cervical back: Neck supple  Neurological:      Mental Status: She is alert  Discussion/Summary:  1  Paroxysmal atrial fibrillation  Did have 2 episodes of palpitations that lasted for several hours  These could be atrial fibrillation  Did recommend she purchase [address] to monitor these episodes to see what exactly is going on    She does have a CHADS vasc 2 score of 3 and should be AC  If indeed she has PAF  We did think that her prior atrial fibrillation was related to hyperthyroidism and I have withheld anticoagulation for this reason also for the fact that she had rather significant GI bleed from unknown source from full-dose aspirin at 1 time  If indeed she had atrial fibrillation we might initiate systemic anticoagulation without aspirin and if there were any issues consider the Watchman procedure  2  Hypertension  Well controlled on nebivolol 5 mg daily  Continue same        FU one year  Marcellus Disla MD

## 2021-10-28 ENCOUNTER — HOSPITAL ENCOUNTER (OUTPATIENT)
Dept: MAMMOGRAPHY | Facility: MEDICAL CENTER | Age: 71
Discharge: HOME | End: 2021-10-28
Payer: MEDICARE

## 2021-10-28 VITALS — WEIGHT: 220 LBS | HEIGHT: 67 IN | BODY MASS INDEX: 34.53 KG/M2

## 2021-10-28 DIAGNOSIS — Z12.31 ENCOUNTER FOR SCREENING MAMMOGRAM FOR MALIGNANT NEOPLASM OF BREAST: ICD-10-CM

## 2021-10-28 PROCEDURE — 77067 SCR MAMMO BI INCL CAD: CPT

## 2021-10-28 PROCEDURE — 77063 BREAST TOMOSYNTHESIS BI: CPT

## 2021-11-09 ENCOUNTER — ANNUAL EXAM (OUTPATIENT)
Dept: GYNECOLOGY | Facility: CLINIC | Age: 71
End: 2021-11-09
Payer: MEDICARE

## 2021-11-09 VITALS
SYSTOLIC BLOOD PRESSURE: 128 MMHG | WEIGHT: 222.8 LBS | DIASTOLIC BLOOD PRESSURE: 78 MMHG | BODY MASS INDEX: 35.81 KG/M2 | HEIGHT: 66 IN

## 2021-11-09 DIAGNOSIS — Z78.0 MENOPAUSE: ICD-10-CM

## 2021-11-09 DIAGNOSIS — N39.41 URGE INCONTINENCE OF URINE: ICD-10-CM

## 2021-11-09 DIAGNOSIS — Z13.820 OSTEOPOROSIS SCREENING: ICD-10-CM

## 2021-11-09 DIAGNOSIS — Z01.411 ENCOUNTER FOR GYNECOLOGICAL EXAMINATION (GENERAL) (ROUTINE) WITH ABNORMAL FINDINGS: Primary | ICD-10-CM

## 2021-11-09 DIAGNOSIS — Z12.4 ENCOUNTER FOR PAPANICOLAOU SMEAR FOR CERVICAL CANCER SCREENING: ICD-10-CM

## 2021-11-09 PROCEDURE — G0476 HPV COMBO ASSAY CA SCREEN: HCPCS | Performed by: OBSTETRICS & GYNECOLOGY

## 2021-11-09 PROCEDURE — G0145 SCR C/V CYTO,THINLAYER,RESCR: HCPCS | Performed by: OBSTETRICS & GYNECOLOGY

## 2021-11-09 PROCEDURE — G0101 CA SCREEN;PELVIC/BREAST EXAM: HCPCS | Performed by: OBSTETRICS & GYNECOLOGY

## 2021-11-17 LAB
LAB AP GYN PRIMARY INTERPRETATION: NORMAL
Lab: NORMAL

## 2021-11-18 ENCOUNTER — DOCUMENTATION (OUTPATIENT)
Dept: GYNECOLOGY | Facility: CLINIC | Age: 71
End: 2021-11-18

## 2021-11-24 ENCOUNTER — DOCUMENTATION (OUTPATIENT)
Dept: GYNECOLOGY | Facility: CLINIC | Age: 71
End: 2021-11-24

## 2022-02-18 ENCOUNTER — HOSPITAL ENCOUNTER (OUTPATIENT)
Dept: BONE DENSITY | Facility: MEDICAL CENTER | Age: 72
Discharge: HOME | End: 2022-02-18
Payer: MEDICARE

## 2022-02-18 DIAGNOSIS — Z13.820 OSTEOPOROSIS SCREENING: ICD-10-CM

## 2022-02-18 DIAGNOSIS — Z78.0 MENOPAUSE: ICD-10-CM

## 2022-02-18 PROCEDURE — 77080 DXA BONE DENSITY AXIAL: CPT

## 2022-02-24 ENCOUNTER — DOCUMENTATION (OUTPATIENT)
Dept: GYNECOLOGY | Facility: CLINIC | Age: 72
End: 2022-02-24

## 2022-02-24 NOTE — PROGRESS NOTES
Letter sent for results of Dexa  Some osteopenia  Continue Vit D and calcium and recheck in 2 years 2024

## 2022-08-01 ENCOUNTER — OFFICE VISIT (OUTPATIENT)
Dept: CARDIOLOGY CLINIC | Facility: CLINIC | Age: 72
End: 2022-08-01
Payer: MEDICARE

## 2022-08-01 VITALS
SYSTOLIC BLOOD PRESSURE: 138 MMHG | DIASTOLIC BLOOD PRESSURE: 80 MMHG | HEART RATE: 65 BPM | WEIGHT: 220.2 LBS | BODY MASS INDEX: 35.54 KG/M2

## 2022-08-01 DIAGNOSIS — I10 BENIGN ESSENTIAL HTN: ICD-10-CM

## 2022-08-01 DIAGNOSIS — G47.10 HYPERSOMNIA, UNSPECIFIED: ICD-10-CM

## 2022-08-01 DIAGNOSIS — I48.0 PAROXYSMAL ATRIAL FIBRILLATION (HCC): Primary | ICD-10-CM

## 2022-08-01 PROBLEM — E66.01 OBESITY, MORBID (HCC): Status: ACTIVE | Noted: 2022-08-01

## 2022-08-01 PROCEDURE — 93000 ELECTROCARDIOGRAM COMPLETE: CPT | Performed by: STUDENT IN AN ORGANIZED HEALTH CARE EDUCATION/TRAINING PROGRAM

## 2022-08-01 PROCEDURE — 99214 OFFICE O/P EST MOD 30 MIN: CPT | Performed by: STUDENT IN AN ORGANIZED HEALTH CARE EDUCATION/TRAINING PROGRAM

## 2022-08-01 NOTE — PROGRESS NOTES
Cardiology Consultation     Doreen Laureano  [id number]  1950 [address] [address] [address] [place] [address]      1  Paroxysmal atrial fibrillation (HCC)  POCT ECG    Diagnostic Sleep Study   2  Hypersomnia, unspecified   Diagnostic Sleep Study   3  Benign essential HTN         Discussion/Summary:  Paroxysmal atrial fibrillation   -initially related to hyperthyroidism, but now having recurrent palpitations   -not on anticoagulation due to GI bleed  -CHADS2 Vasc score of 3 (hypertension, female, agex1)  -will check sleep study, patient reports heavy snoring at night, nighttime awakenings and daytime somnolence  -patient is working on decreasing her alcohol consumption, and weight loss currently  -can consider loop recorder in future if she has recurrent episodes  -also taking aspirin 81 mg daily  Hypertension  -on nebivolol 5 mg daily  -BP well controlled on current regiment      History of Present Illness:  Patient is a 51-year-old female with past medical history of paroxysmal atrial fibrillation and hypertension  Patient reports feeling well today  She reports having a recurrent episode of palpitations in March of this year the last about 12 hours before resolving  Prior to that, she had 1 episode of palpitations last year  During that time she feels her heart is irregular and is very symptomatic  She has noted 1 of her triggers is alcohol use, so has been working on limiting her alcohol consumption  She is also concerned about sleep apnea  She reports her  noted her snoring loudly, and she has several to nighttime awakenings along with daytime somnolence  She does have a [employer] mobile, but has not set up yet  Otherwise denies any chest pain, headache, dizziness, syncope, dyspnea exertion or other cardiac symptoms        Patient Active Problem List   Diagnosis    PAF (paroxysmal atrial fibrillation) (HCC)    Benign essential HTN    Postablative hypothyroidism    Fall from slipping on ice    Closed fracture of medial malleolus of left tibia    Bimalleolar ankle fracture, left, closed, initial encounter    Peroneal tendonitis of left lower extremity    Obesity, morbid (HCC)     Past Medical History:   Diagnosis Date    Hypertension      Social History     Socioeconomic History    Marital status: /Civil Union     Spouse name: Not on file    Number of children: Not on file    Years of education: Not on file    Highest education level: Not on file   Occupational History    Not on file   Tobacco Use    Smoking status: Passive Smoke Exposure - Never Smoker    Smokeless tobacco: Never Used   Substance and Sexual Activity    Alcohol use: No    Drug use: No    Sexual activity: Not on file   Other Topics Concern    Not on file   Social History Narrative    Not on file     Social Determinants of Health     Financial Resource Strain: Not on file   Food Insecurity: Not on file   Transportation Needs: Not on file   Physical Activity: Not on file   Stress: Not on file   Social Connections: Not on file   Intimate Partner Violence: Not on file   Housing Stability: Not on file      Family History   Problem Relation Age of Onset    Coronary artery disease Family     No Known Problems Mother     Lung cancer Father 79    No Known Problems Sister     No Known Problems Daughter     No Known Problems Maternal Grandmother     No Known Problems Maternal Grandfather     No Known Problems Paternal Grandmother     No Known Problems Paternal Grandfather     No Known Problems Maternal Aunt     No Known Problems Maternal Aunt     No Known Problems Maternal Aunt     No Known Problems Maternal Aunt     No Known Problems Paternal Aunt     No Known Problems Paternal Aunt     No Known Problems Paternal Aunt      Past Surgical History:   Procedure Laterality Date    BREAST BIOPSY Right 1996    core bx  benign    BREAST LUMPECTOMY Right 1990    benign    BUNIONECTOMY Left     HAND SURGERY Right     ganglion cyst removal    ORIF ANKLE FRACTURE Right 2005    ORIF TIBIA & FIBULA FRACTURES Left 2/13/2019    Procedure: OPEN REDUCTION W/ INTERNAL FIXATION (ORIF) ANKLE;  Surgeon: Donna Meehan DO;  Location: [facility];  Service: Orthopedics    THYROIDECTOMY      TONSILLECTOMY AND ADENOIDECTOMY         Current Outpatient Medications:     aspirin (ECOTRIN LOW STRENGTH) 81 mg EC tablet, Take 1 tablet (81 mg total) by mouth daily, Disp: , Rfl:     Cholecalciferol 50 MCG (2000 UT) CAPS, Take 2,000 Units by mouth daily , Disp: , Rfl:     levothyroxine 100 mcg tablet, Take 100 mcg by mouth daily, Disp: , Rfl: 2    Multiple Vitamins-Minerals (multivitamin with minerals) tablet, Take 1 tablet by mouth daily, Disp: , Rfl:     nebivolol (BYSTOLIC) 5 mg tablet, TAKE 1 TABLET BY MOUTH DAILY, Disp: 90 tablet, Rfl: 3    omeprazole (PriLOSEC) 20 mg delayed release capsule, TAKE 1 CAPSULE(20 MG) BY MOUTH DAILY, Disp: 90 capsule, Rfl: 3  No Known Allergies      Labs:  Lab Results   Component Value Date    ALT 25 02/08/2015    AST 19 02/08/2015    BUN 11 02/14/2019    CALCIUM 7 4 (L) 02/14/2019     (H) 02/14/2019    CO2 26 02/14/2019    CREATININE 0 71 02/14/2019    HCT 40 2 02/14/2019    HGB 12 9 02/14/2019    MG 2 0 02/09/2015     02/14/2019    K 3 7 02/14/2019     02/09/2015    WBC 9 84 02/14/2019       Imaging: No results found  ECG:  Normal sinus rhythm, nonspecific T-wave changes    Review of Systems:  Review of Systems   Constitutional: Negative for fatigue and fever  HENT: Negative for congestion  Respiratory: Negative for chest tightness and shortness of breath  Cardiovascular: Positive for palpitations and leg swelling  Negative for chest pain  Gastrointestinal: Negative for constipation and nausea  Genitourinary: Negative for dysuria  Skin: Negative for color change and pallor     Neurological: Negative for syncope and headaches           Vitals:    08/01/22 1611   BP: 138/80   Pulse: 65      Vitals:    08/01/22 1611   Weight: 99 9 kg (220 lb 3 2 oz)           Physical Exam:  General appearance:  Appears stated age, alert, well appearing and in no distress  HEENT:  PERRLA, EOMI, no scleral icterus, no conjunctival pallor  NECK:  Supple, No elevated JVP, no thyromegaly, no carotid bruits  HEART:  Regular rate and rhythm, normal S1/S2, no S3/S4, no murmur or rub  LUNGS:  Clear to auscultation bilaterally  ABDOMEN:  Soft, non-tender, positive bowel sounds, no rebound or guarding, no organomegaly   EXTREMITIES:  1+ LE edema, L>R  VASCULAR:  Normal pedal pulses   SKIN: No lesions or rashes on exposed skin  NEURO:  CN II-XII intact, no focal deficits

## 2022-08-15 ENCOUNTER — TELEPHONE (OUTPATIENT)
Dept: SLEEP CENTER | Facility: CLINIC | Age: 72
End: 2022-08-15

## 2022-08-15 NOTE — TELEPHONE ENCOUNTER
----- Message from Elizabeth Stock MD sent at 8/10/2022  4:04 PM EDT -----  Approved    ----- Message -----  From: Merced Hernandez  Sent: 8/2/2022   9:46 AM EDT  To: Sleep Medicine [facility] Provider    This diagnostic sleep study needs approval      If approved please sign and return to clerical pool  If denied please include reasons why  Also provide alternative testing if warranted  Please sign and return to clerical pool

## 2022-08-18 ENCOUNTER — TELEPHONE (OUTPATIENT)
Dept: CARDIOLOGY CLINIC | Facility: CLINIC | Age: 72
End: 2022-08-18

## 2022-08-18 DIAGNOSIS — I48.0 PAF (PAROXYSMAL ATRIAL FIBRILLATION) (HCC): Primary | ICD-10-CM

## 2022-08-18 NOTE — TELEPHONE ENCOUNTER
Patient called reporting she had recurrent episode of palpitations  This is concerning for recurrent atrial fibrillation  Will check a 2 week ZIO patch to look for recurrent atrial fibrillation  If this test is negative for AFib, will consider loop recorder  Discussed plan with patient and she is agreeable

## 2022-08-18 NOTE — TELEPHONE ENCOUNTER
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 Please advise

## 2022-09-13 ENCOUNTER — CLINICAL SUPPORT (OUTPATIENT)
Dept: CARDIOLOGY CLINIC | Facility: CLINIC | Age: 72
End: 2022-09-13
Payer: MEDICARE

## 2022-09-13 ENCOUNTER — TELEPHONE (OUTPATIENT)
Dept: CARDIOLOGY CLINIC | Facility: CLINIC | Age: 72
End: 2022-09-13

## 2022-09-13 DIAGNOSIS — I48.0 PAF (PAROXYSMAL ATRIAL FIBRILLATION) (HCC): ICD-10-CM

## 2022-09-13 PROCEDURE — 93248 EXT ECG>7D<15D REV&INTERPJ: CPT | Performed by: STUDENT IN AN ORGANIZED HEALTH CARE EDUCATION/TRAINING PROGRAM

## 2022-09-13 NOTE — TELEPHONE ENCOUNTER
----- Message from Oli Franco MD sent at 9/13/2022 10:41 AM EDT -----  Can you please call this patient to make a follow up appointment with me to review zio patch results     Thanks,  Saran Wilkes

## 2022-10-03 NOTE — PROGRESS NOTES
Cardiology Consultation     Netta Ludwig  [id number]  1950  [facility] CARDIOLOGY Branch  [address] [facility] [address]      1  PAF (paroxysmal atrial fibrillation) ([facility] [address] )     2  Benign essential HTN         Discussion/Summary:  Paroxysmal atrial fibrillation   -initially related to hyperthyroidism, but now having recurrent intermittent palpitations   -not on anticoagulation due to GI bleed  -CHADS2 Vasc score of 3 (hypertension, female, agex1)  -will check sleep study, patient reports heavy snoring at night, nighttime awakenings and daytime somnolence, currently scheduled for 01/03/2023  -reports cutting out alcohol as this was a major trigger  -2 week ZIO patch 09/13/2022 triggered episodes correlate with rare PACs, several episodes of SVT with longest lasting 14 beats which may represent atrial fibrillation versus atrial tachycardia  -can consider loop recorder or repeat ZIO patch in future if she has recurrent episodes  -also taking aspirin 81 mg daily  Hypertension  -on nebivolol 5 mg daily  -BP well controlled on current regiment    History of Present Illness:  Netta Ludwig is a 73y o  year old female with a past medical history of paroxysmal atrial fibrillation and hypertension  Patient reports feeling well today  She reports having a recurrent episode of palpitations in March of this year the last about 12 hours before resolving  Prior to that, she had 1 episode of palpitations last year  During that time she feels her heart is irregular and is very symptomatic  She has noted 1 of her triggers is alcohol use, so has been working on limiting her alcohol consumption  She is also concerned about sleep apnea  She reports her  noted her snoring loudly, and she has several to nighttime awakenings along with daytime somnolence  She does have a [employer] mobile, but has not set up yet    Otherwise denies any chest pain, headache, dizziness, syncope, dyspnea exertion or other cardiac symptoms  IntervalI history:   She reports feeling okay today  Her main issue lately has been vertigo  She reports feeling the room spin whenever she changes position in particular when she lays down at night or sits up from bed too quickly  Due to these symptoms, she has not noticed as many palpitations  She is unsure if she is having less of them or so just less aware of it because of her vertigo  Also reports having a lot of stress at work and in the rest of her life  She is also going to work on reducing her stressors as well      Patient Active Problem List   Diagnosis    PAF (paroxysmal atrial fibrillation) (HCC)    Benign essential HTN    Postablative hypothyroidism    Fall from slipping on ice    Closed fracture of medial malleolus of left tibia    Bimalleolar ankle fracture, left, closed, initial encounter    Peroneal tendonitis of left lower extremity    Obesity, morbid ([facility] [address] )     Past Medical History:   Diagnosis Date    Hypertension      Social History     Socioeconomic History    Marital status: /Civil Union     Spouse name: Not on file    Number of children: Not on file    Years of education: Not on file    Highest education level: Not on file   Occupational History    Not on file   Tobacco Use    Smoking status: Passive Smoke Exposure - Never Smoker    Smokeless tobacco: Never Used   Substance and Sexual Activity    Alcohol use: No    Drug use: No    Sexual activity: Not on file   Other Topics Concern    Not on file   Social History Narrative    Not on file     Social Determinants of Health     Financial Resource Strain: Not on file   Food Insecurity: Not on file   Transportation Needs: Not on file   Physical Activity: Not on file   Stress: Not on file   Social Connections: Not on file   Intimate Partner Violence: Not on file   Housing Stability: Not on file      Family History   Problem Relation Age of Onset    Coronary artery disease Family     No Known Problems Mother     Lung cancer Father 79    No Known Problems Sister     No Known Problems Daughter     No Known Problems Maternal Grandmother     No Known Problems Maternal Grandfather     No Known Problems Paternal Grandmother     No Known Problems Paternal Grandfather     No Known Problems Maternal Aunt     No Known Problems Maternal Aunt     No Known Problems Maternal Aunt     No Known Problems Maternal Aunt     No Known Problems Paternal Aunt     No Known Problems Paternal Aunt     No Known Problems Paternal Aunt      Past Surgical History:   Procedure Laterality Date    BREAST BIOPSY Right 1996    core bx  benign    BREAST LUMPECTOMY Right 1990    benign    BUNIONECTOMY Left     HAND SURGERY Right     ganglion cyst removal    ORIF ANKLE FRACTURE Right 2005    ORIF TIBIA & FIBULA FRACTURES Left 2/13/2019    Procedure: OPEN REDUCTION W/ INTERNAL FIXATION (ORIF) ANKLE;  Surgeon: Zoila Lujan DO;  Location[facility];  Service: Orthopedics    THYROIDECTOMY      TONSILLECTOMY AND ADENOIDECTOMY         Current Outpatient Medications:     aspirin (ECOTRIN LOW STRENGTH) 81 mg EC tablet, Take 1 tablet (81 mg total) by mouth daily, Disp: , Rfl:     Cholecalciferol 50 MCG (2000 UT) CAPS, Take 2,000 Units by mouth daily , Disp: , Rfl:     levothyroxine 100 mcg tablet, Take 100 mcg by mouth daily, Disp: , Rfl: 2    Multiple Vitamins-Minerals (multivitamin with minerals) tablet, Take 1 tablet by mouth daily, Disp: , Rfl:     nebivolol (BYSTOLIC) 5 mg tablet, TAKE 1 TABLET BY MOUTH DAILY, Disp: 90 tablet, Rfl: 3    omeprazole (PriLOSEC) 20 mg delayed release capsule, TAKE 1 CAPSULE(20 MG) BY MOUTH DAILY, Disp: 90 capsule, Rfl: 3  No Known Allergies      Labs:  Lab Results   Component Value Date    ALT 25 02/08/2015    AST 19 02/08/2015    BUN 11 02/14/2019    CALCIUM 7 4 (L) 02/14/2019     (H) 02/14/2019    CO2 26 02/14/2019    CREATININE 0 71 02/14/2019    HCT 40 2 02/14/2019    HGB 12 9 2019    MG 2 0 2015     2019    K 3 7 2019     2015    WBC 9 84 2019       Imaging: No results found  EC2022 normal sinus rhythm, nonspecific T-wave changes    Review of Systems:  Review of Systems   Constitutional: Negative for chills, diaphoresis, fatigue and fever  HENT: Negative for congestion  Eyes: Negative for photophobia and visual disturbance  Respiratory: Negative for chest tightness and shortness of breath  Cardiovascular: Positive for palpitations  Negative for chest pain and leg swelling  Gastrointestinal: Negative for abdominal distention, abdominal pain, diarrhea, nausea and vomiting  Genitourinary: Negative for difficulty urinating and dysuria  Musculoskeletal: Negative for arthralgias, gait problem and joint swelling  Skin: Negative for color change, pallor and rash  Neurological: Positive for dizziness and headaches  Negative for syncope and numbness  Psychiatric/Behavioral: Negative for agitation, behavioral problems and confusion           Vitals:    10/04/22 1454   BP: 110/82   Pulse: 72      Vitals:    10/04/22 1454   Weight: 99 3 kg (219 lb)     Height: 5' 6" (167 6 cm)     Physical Exam:  General appearance:  Appears stated age, alert, well appearing and in no distress  HEENT:  PERRLA, EOMI, no scleral icterus, no conjunctival pallor  NECK:  Supple, No elevated JVP, no thyromegaly, no carotid bruits  HEART:  Regular rate and rhythm, normal S1/S2, no S3/S4, no murmur or rub  LUNGS:  Clear to auscultation bilaterally  ABDOMEN:  Soft, non-tender, positive bowel sounds, no rebound or guarding, no organomegaly   EXTREMITIES:  No edema  VASCULAR:  Normal pedal pulses   SKIN: No lesions or rashes on exposed skin  NEURO:  CN II-XII intact, no focal deficits

## 2022-10-04 ENCOUNTER — OFFICE VISIT (OUTPATIENT)
Dept: CARDIOLOGY CLINIC | Facility: CLINIC | Age: 72
End: 2022-10-04
Payer: MEDICARE

## 2022-10-04 VITALS
HEIGHT: 66 IN | WEIGHT: 219 LBS | DIASTOLIC BLOOD PRESSURE: 82 MMHG | BODY MASS INDEX: 35.2 KG/M2 | SYSTOLIC BLOOD PRESSURE: 110 MMHG | HEART RATE: 72 BPM

## 2022-10-04 DIAGNOSIS — I10 BENIGN ESSENTIAL HTN: ICD-10-CM

## 2022-10-04 DIAGNOSIS — I48.0 PAF (PAROXYSMAL ATRIAL FIBRILLATION) (HCC): Primary | ICD-10-CM

## 2022-10-04 PROCEDURE — 99214 OFFICE O/P EST MOD 30 MIN: CPT | Performed by: STUDENT IN AN ORGANIZED HEALTH CARE EDUCATION/TRAINING PROGRAM

## 2022-10-10 DIAGNOSIS — Z12.31 ENCOUNTER FOR SCREENING MAMMOGRAM FOR BREAST CANCER: Primary | ICD-10-CM

## 2022-10-20 NOTE — ED NOTES
Cardiology at bedside        Judeen Reason, RN  02/13/19 9413 [de-identified] : 53 yo F with parotid tumor (s/p resection, pathology- pleomorphic adenoma) presents recent MRI finding of thoracic spine enhancing lesion.\par She denies back pain, LE weakness, balance problem BB, dysfunction.

## 2022-10-31 ENCOUNTER — ANNUAL EXAM (OUTPATIENT)
Dept: GYNECOLOGY | Facility: CLINIC | Age: 72
End: 2022-10-31

## 2022-10-31 VITALS
SYSTOLIC BLOOD PRESSURE: 110 MMHG | BODY MASS INDEX: 34.88 KG/M2 | DIASTOLIC BLOOD PRESSURE: 70 MMHG | HEIGHT: 67 IN | HEART RATE: 71 BPM | WEIGHT: 222.2 LBS

## 2022-10-31 DIAGNOSIS — Z01.419 ENCOUNTER FOR GYNECOLOGICAL EXAMINATION (GENERAL) (ROUTINE) WITHOUT ABNORMAL FINDINGS: Primary | ICD-10-CM

## 2022-10-31 DIAGNOSIS — Z12.4 ENCOUNTER FOR PAPANICOLAOU SMEAR FOR CERVICAL CANCER SCREENING: ICD-10-CM

## 2022-10-31 NOTE — PROGRESS NOTES
Assessment/Plan:     Recommended monthly SBE, annual CBE and annual screening mammo  ASCCP guidelines reviewed and pap with cotesting noted to be up to date; this low risk patient was advised she meets criteria to d/c pap screening at age 74  Pap done at pt request  DEXA and colonoscopy noted to be up to date  Reviewed diet/activity recommendations Calcium 1200 mg and Vit D 600-1000 IU daily  Discussed postmenopausal considerations and symptoms to report  Kegel exercises as instructed  RTO in one year for routine annual gyn exam or sooner PRN  Diagnoses and all orders for this visit:    Encounter for gynecological examination (general) (routine) without abnormal findings        Subjective:      Patient ID: Marlene Singh is a 73 y o  female        This patient presents for routine annual gyn exam   She denies  bleeding or spotting, VM sx, pelvic pain, dyspareunia, breast concerns, abnormal discharge, bowel/bladder dysfunction, depression/anx  , not sexually active for four years  Pt fell about 2 weeks ago and has several areas of ecchymosis  She does not believe she broke anything  She was never evaluated  Pap/HPV up to date and normal, 11/9/21  Patient requesting a pap, she was made aware of possible financial responsibility  Mammography up to date and normal, 10/28/21  Osteoporosis screening up to date, osteopenia, 2/18/22  The following portions of the patient's history were reviewed and updated as appropriate: allergies, current medications, past family history, past medical history, past social history, past surgical history and problem list     Review of Systems   Constitutional: Negative  Respiratory: Negative  Cardiovascular: Negative  Gastrointestinal: Negative  Endocrine: Negative  Genitourinary: Negative for dysuria, frequency, pelvic pain, urgency, vaginal bleeding, vaginal discharge and vaginal pain  Musculoskeletal: Negative  Skin: Negative  Neurological: Negative  Psychiatric/Behavioral: Negative  Objective:      /70   Pulse 71   Ht 5' 6 5" (1 689 m)   Wt 101 kg (222 lb 3 2 oz)   LMP  (LMP Unknown)   BMI 35 33 kg/m²          Physical Exam  Vitals and nursing note reviewed  Exam conducted with a chaperone present  Constitutional:       Appearance: Normal appearance  She is well-developed  HENT:      Head: Normocephalic and atraumatic  Neck:      Thyroid: No thyroid mass or thyromegaly  Cardiovascular:      Rate and Rhythm: Normal rate and regular rhythm  Heart sounds: Normal heart sounds  Pulmonary:      Effort: Pulmonary effort is normal       Breath sounds: Normal breath sounds  Chest:   Breasts: Breasts are symmetrical       Right: No inverted nipple, mass, nipple discharge, skin change or tenderness  Left: No inverted nipple, mass, nipple discharge, skin change or tenderness  Abdominal:      General: Bowel sounds are normal       Palpations: Abdomen is soft  Tenderness: There is no abdominal tenderness  Hernia: There is no hernia in the left inguinal area or right inguinal area  Genitourinary:     General: Normal vulva  Exam position: Supine  Pubic Area: No rash  Labia:         Right: No rash, tenderness, lesion or injury  Left: No rash, tenderness, lesion or injury  Urethra: No prolapse, urethral pain, urethral swelling or urethral lesion  Vagina: Normal  No signs of injury and foreign body  No vaginal discharge, erythema, tenderness, bleeding, lesions or prolapsed vaginal walls  Cervix: No cervical motion tenderness, discharge, friability, lesion, erythema, cervical bleeding or eversion  Uterus: Not deviated, not enlarged, not fixed, not tender and no uterine prolapse  Adnexa:         Right: No mass, tenderness or fullness  Left: No mass, tenderness or fullness  Rectum: No external hemorrhoid        Comments: Urethra normal without lesions  No bladder tenderness  Musculoskeletal:         General: Normal range of motion  Cervical back: Normal range of motion and neck supple  Lymphadenopathy:      Lower Body: No right inguinal adenopathy  No left inguinal adenopathy  Skin:     General: Skin is warm and dry  Neurological:      Mental Status: She is alert and oriented to person, place, and time  Psychiatric:         Speech: Speech normal          Behavior: Behavior normal  Behavior is cooperative

## 2022-11-05 LAB
LAB AP GYN PRIMARY INTERPRETATION: NORMAL
Lab: NORMAL

## 2022-11-20 ENCOUNTER — OFFICE VISIT (OUTPATIENT)
Dept: URGENT CARE | Age: 72
End: 2022-11-20

## 2022-11-20 VITALS
TEMPERATURE: 98.2 F | RESPIRATION RATE: 20 BRPM | HEART RATE: 75 BPM | BODY MASS INDEX: 35.03 KG/M2 | WEIGHT: 218 LBS | HEIGHT: 66 IN | OXYGEN SATURATION: 95 %

## 2022-11-20 DIAGNOSIS — U07.1 COVID: Primary | ICD-10-CM

## 2022-11-20 LAB
SARS-COV-2 AG UPPER RESP QL IA: POSITIVE
VALID CONTROL: ABNORMAL

## 2022-11-20 RX ORDER — ONDANSETRON 4 MG/1
4 TABLET, ORALLY DISINTEGRATING ORAL EVERY 6 HOURS PRN
Status: SHIPPED | OUTPATIENT
Start: 2022-11-20

## 2022-11-20 RX ORDER — ONDANSETRON 4 MG/1
4 TABLET, ORALLY DISINTEGRATING ORAL EVERY 6 HOURS PRN
Qty: 20 TABLET | Refills: 0 | Status: SHIPPED | OUTPATIENT
Start: 2022-11-20

## 2022-11-20 RX ORDER — ONDANSETRON 4 MG/1
4 TABLET, ORALLY DISINTEGRATING ORAL EVERY 6 HOURS PRN
Qty: 20 TABLET | Refills: 0 | Status: SHIPPED | OUTPATIENT
Start: 2022-11-20 | End: 2022-11-20

## 2022-11-20 NOTE — PROGRESS NOTES
[facility]        NAME: Ifeanyi Don is a 73 y o  female  : 1950    MRN: [id number]  DATE: 2022  TIME: 5:50 PM    Assessment and Plan   COVID [U07 1]  1  COVID  ondansetron (ZOFRAN-ODT) dispersible tablet 4 mg    Poct Covid 19 Rapid Antigen Test    ondansetron (Zofran ODT) 4 mg disintegrating tablet    DISCONTINUED: ondansetron (Zofran ODT) 4 mg disintegrating tablet            Patient Instructions     Use Zofran as directed for symptomatic relief  Motrin and/or Tylenol as needed for fevers and pain  Drink plenty of fluids and stay well hydrated  Follow up with PCP in 3-5 days  Proceed to  ER if symptoms worsen  Chief Complaint     Chief Complaint   Patient presents with   • Vomiting     Cough, vomiting, chill, headache began yesterday         History of Present Illness       44-year-old female presents with body aches pains headaches nausea vomiting  Symptoms started today  Denies any chest pain shortness of breath  No sore throats or ear pain  Reports that daughter just tested positive for COVID today  Vomiting   This is a new problem  The current episode started today  The problem occurs 2 to 4 times per day  The problem has been unchanged  The emesis has an appearance of stomach contents  There has been no fever  Associated symptoms include abdominal pain, chills, a fever, headaches and sweats  Pertinent negatives include no arthralgias, chest pain, diarrhea or myalgias  Risk factors include ill contacts  She has tried nothing for the symptoms  The treatment provided no relief  Review of Systems   Review of Systems   Constitutional: Positive for chills and fever  HENT: Negative  Eyes: Negative  Respiratory: Negative  Cardiovascular: Negative  Negative for chest pain  Gastrointestinal: Positive for abdominal pain and vomiting  Negative for diarrhea  Musculoskeletal: Negative  Negative for arthralgias and myalgias  Skin: Negative  Neurological: Positive for headaches           Current Medications       Current Outpatient Medications:   •  aspirin (ECOTRIN LOW STRENGTH) 81 mg EC tablet, Take 1 tablet (81 mg total) by mouth daily, Disp: , Rfl:   •  Cholecalciferol 50 MCG (2000 UT) CAPS, Take 2,000 Units by mouth daily , Disp: , Rfl:   •  levothyroxine 100 mcg tablet, Take 100 mcg by mouth daily, Disp: , Rfl: 2  •  Multiple Vitamins-Minerals (multivitamin with minerals) tablet, Take 1 tablet by mouth daily, Disp: , Rfl:   •  nebivolol (BYSTOLIC) 5 mg tablet, TAKE 1 TABLET BY MOUTH DAILY, Disp: 90 tablet, Rfl: 3  •  omeprazole (PriLOSEC) 20 mg delayed release capsule, TAKE 1 CAPSULE(20 MG) BY MOUTH DAILY, Disp: 90 capsule, Rfl: 3  •  ondansetron (Zofran ODT) 4 mg disintegrating tablet, Take 1 tablet (4 mg total) by mouth every 6 (six) hours as needed for nausea or vomiting, Disp: 20 tablet, Rfl: 0    Current Facility-Administered Medications:   •  ondansetron (ZOFRAN-ODT) dispersible tablet 4 mg, 4 mg, Oral, Q6H PRN, Johnny Glass PA-C    Current Allergies     Allergies as of 11/20/2022   • (No Known Allergies)            The following portions of the patient's history were reviewed and updated as appropriate: allergies, current medications, past family history, past medical history, past social history, past surgical history and problem list      Past Medical History:   Diagnosis Date   • Disease of thyroid gland    • Hypertension        Past Surgical History:   Procedure Laterality Date   • BREAST BIOPSY Right 1996    core bx  benign   • BREAST LUMPECTOMY Right 1990    benign   • BUNIONECTOMY Left    • HAND SURGERY Right     ganglion cyst removal   • ORIF ANKLE FRACTURE Right 2005   • ORIF TIBIA & FIBULA FRACTURES Left 2/13/2019    Procedure: OPEN REDUCTION W/ INTERNAL FIXATION (ORIF) ANKLE;  Surgeon: Jon Faye DO;  Location: [facility];  Service: Orthopedics   • THYROIDECTOMY     • TONSILLECTOMY AND ADENOIDECTOMY         Family History   Problem Relation Age of Onset   • Coronary artery disease Family    • No Known Problems Mother    • Lung cancer Father 79   • No Known Problems Sister    • No Known Problems Daughter    • No Known Problems Maternal Grandmother    • No Known Problems Maternal Grandfather    • No Known Problems Paternal Grandmother    • No Known Problems Paternal Grandfather    • No Known Problems Maternal Aunt    • No Known Problems Maternal Aunt    • No Known Problems Maternal Aunt    • No Known Problems Maternal Aunt    • No Known Problems Paternal Aunt    • No Known Problems Paternal Aunt    • No Known Problems Paternal Aunt          Medications have been verified  Objective   Pulse 75   Temp 98 2 °F (36 8 °C)   Resp 20   Ht 5' 6" (1 676 m)   Wt 98 9 kg (218 lb)   LMP  (LMP Unknown)   SpO2 95%   BMI 35 19 kg/m²   No LMP recorded (lmp unknown)  Patient is postmenopausal        Physical Exam     Physical Exam  Vitals and nursing note reviewed  Constitutional:       General: She is not in acute distress  Appearance: Normal appearance  She is well-developed  She is obese  She is ill-appearing  HENT:      Head: Normocephalic and atraumatic  Right Ear: Hearing, tympanic membrane, ear canal and external ear normal  There is no impacted cerumen  Left Ear: Hearing, tympanic membrane, ear canal and external ear normal  There is no impacted cerumen  Nose: Nose normal       Mouth/Throat:      Pharynx: Uvula midline  No oropharyngeal exudate  Eyes:      General:         Right eye: No discharge  Left eye: No discharge  Conjunctiva/sclera: Conjunctivae normal    Cardiovascular:      Rate and Rhythm: Normal rate and regular rhythm  Heart sounds: Normal heart sounds  No murmur heard  Pulmonary:      Effort: Pulmonary effort is normal  No respiratory distress  Breath sounds: Normal breath sounds  No wheezing or rales     Abdominal:      General: Bowel sounds are normal  Palpations: Abdomen is soft  Tenderness: There is no abdominal tenderness  Musculoskeletal:         General: Normal range of motion  Cervical back: Normal range of motion and neck supple  Lymphadenopathy:      Cervical: No cervical adenopathy  Skin:     General: Skin is warm and dry  Neurological:      Mental Status: She is alert and oriented to person, place, and time  Psychiatric:         Mood and Affect: Mood normal            Patient does not have any recent blood work and system for [facility] [address]  Patient reports she has had recent blood work done by PCP    Told patient to contact PCP tomorrow to review blood work to see if she is eligible for  Paxlovid

## 2022-11-20 NOTE — PATIENT INSTRUCTIONS
Use Zofran as directed for symptomatic relief  Motrin and/or Tylenol as needed for fevers and pain  Drink plenty of fluids and stay well hydrated  Follow up with PCP in 3-5 days  Proceed to  ER if symptoms worsen  COVID-19 (Coronavirus Disease 2019)   AMBULATORY CARE:   What you need to know about COVID-19:  COVID-19 is the disease caused by a coronavirus first discovered in December 2019  Coronaviruses generally cause upper respiratory (nose, throat, and lung) infections, such as a cold  The 2019 virus spreads quickly and easily  It can be spread starting 2 to 3 days before symptoms even begin  What you need to know about variants: The virus has changed into several new forms (called variants) since it was discovered  The variants may be more contagious (easily spread) than the original form  Some may also cause more severe illness than others  Signs and symptoms of COVID-19  may not develop  Signs and symptoms usually start about 5 days after infection but can take 2 to 14 days  You may feel like you have the flu or a bad cold  Some signs and symptoms go away in a few days  Others can last weeks, months, or possibly years  You may have any of the following:  A cough    Shortness of breath or trouble breathing that may become severe    A fever    Chills that might include shaking    Muscle pain, body aches, or a headache    A sore throat    Sudden changes or loss of your taste or smell    Feeling mentally and physically tired (fatigue)    Congestion (stuffy head and nose), or a runny nose    Diarrhea, nausea, or vomiting    Call your local emergency number (911 in the [address] [facility] [facility]) if:   You have trouble breathing or shortness of breath at rest     You have chest pain or pressure that lasts longer than 5 minutes  You become confused or hard to wake  Your lips or face are blue  Seek care immediately if:   You have a fever of 104°F (40°C) or higher        Call your doctor if:   You have symptoms of COVID-19  You have questions or concerns about your condition or care  How COVID-19 is diagnosed:  Testing is offered at many sites  You may need to quarantine until you get your results  Any of the following tests may be used:  A viral PCR test  shows if you have a current infection  A sample is taken from your nose or throat with a swab  You may need to wait 1 or more days to get the test results  An antigen test  shows if you have a protein from the COVID-19 virus  This test is often called a rapid test because the results can be available in 30 minutes or less  An antibody test  shows if you had a recent or past infection  Blood samples are used for this test  Antibodies are made by your immune system to fight the virus that causes COVID-19  Antibodies form 1 to 3 weeks after you are infected  This test is not used to show if you are immune to the virus  A CT, MRI, ultrasound, or x-ray  may be used to check for complications of [id number]  These may include pneumonia, blood clots, or other complications  Treatment:   Mild symptoms  may get better on their own  Some treatments have emergency use authorization (EUA)  Examples include monoclonal antibodies and convalescent plasma  These may be given to help prevent worsening of your symptoms  You may also need any of the following:    Decongestants  help reduce nasal congestion and help you breathe more easily  If you take decongestant pills, they may make you feel restless or cause problems with your sleep  Do not use decongestant sprays for more than a few days  Cough suppressants  help reduce coughing  Ask your healthcare provider which type of cough medicine is best for you  To soothe a sore throat,  gargle with warm salt water, or use throat lozenges or a throat spray  Drink more liquids to thin and loosen mucus and to prevent dehydration  NSAIDs or acetaminophen  can help lower a fever and relieve body aches or a headache   Follow directions  If not taken correctly, NSAIDs can cause kidney damage and acetaminophen can cause liver damage  Severe or life-threatening symptoms  are treated in the hospital  You may need any of the following:     Medicines  may be given to fight the virus or treat inflammation  Blood thinners  help prevent or treat blood clots  If you have a deep vein thrombosis (DVT) or pulmonary embolism (PE), you may need to use blood thinners for at least 3 months  Extra oxygen  may be given if you have respiratory failure  This means your lungs cannot get enough oxygen into your blood and out to your organs  A ventilator  may be used to help you breathe  What you need to know about health problems the virus may cause: You may develop long-term health problems caused by the virus  Your risk is higher if you are 65 or older  A weak immune system, obesity, diabetes, chronic kidney disease, or a heart or lung condition can also increase your risk  Your risk is also higher if you are a current or former cigarette smoker  COVID-19 can lead to any of the following:  Multisymptom inflammatory syndrome in adults (MIS-A) or in children (MIS-C), causing inflammation in the heart, digestive system, skin, or brain    Shortness of breath, serious lower respiratory conditions, such as pneumonia or acute respiratory distress syndrome (ARDS)    Blood clots or blood vessel damage    Organ damage from a lack of oxygen or from blood clots    Sleep problems    Problems thinking clearly, remembering information, or concentrating    Mood changes, depression, or anxiety    Long-term problems tasting or smelling    Loss of appetite and weight loss    Nerve pain    Fatigue (feeling mentally and physically tired)    What you need to know about COVID-19 vaccines:  Healthcare providers recommend a COVID-19 vaccine, even if you have already had COVID-19   You are considered fully vaccinated against COVID-19 two weeks after the final dose of any COVID-19 vaccine  Let your healthcare provider know when you have received the final dose of the vaccine  Make a copy of your vaccination card  Keep the original with you in case you need to show it  Keep the copy in a safe place  COVID-19 vaccines are given as a shot in 1 or 2 doses  Vaccination is recommended for everyone 5 years or older  One 2-dose vaccine is fully approved  for those 16 years or older  This vaccine also has an emergency use authorization (EUA) for children 11to 13years old  No vaccine is currently available for children younger than 5 years  A booster (additional) dose  is given to help the immune system continue to protect against severe COVID-19  A booster is recommended for all adults 18 or older  The booster can be a different brand of the COVID-19 vaccine than you originally received  The timing for the booster depends on the type of vaccine you received:    1-dose vaccine: The booster is given at least 2 months after you received the vaccine  2-dose vaccine: The booster is given at least 5 or 6 months after the second dose  A booster can be given to adolescents 15to 16years old  Only 1 COVID-19 vaccine has this EUA  The booster is given at least 5 months after the second dose of the original vaccine series  A booster is recommended for immunocompromised children 11to 6years old  Only 1 COVID-19 vaccine has this EUA  The booster is given 28 days after the second dose  Continue social distancing and other measures, even after you get the vaccine  Although it is not common, you can become infected after you get the vaccine  You may also be able to pass the virus to others without knowing you are infected  After you get the vaccine, check local, national, and international travel rules  You may need to be tested before you travel  Some countries require proof of a negative test before you travel   You may also need to quarantine after you return  Medicine may be given to prevent infection  The medicine can be given if you are at high risk for infection and cannot get the vaccine  It can also be given if your immune system does not respond well to the vaccine  How the 2019 coronavirus spreads:   Droplets are the main way all coronaviruses spread  The virus travels in droplets that form when a person talks, sings, coughs, or sneezes  The droplets can also float in the air for minutes or hours  Infection happens when you breathe in the droplets or get them in your eyes or nose  Close personal contact with an infected person increases your risk for infection  This means being within 6 feet (2 meters) of the person for at least 15 minutes over 24 hours  Person-to-person contact can spread the virus  For example, a person with the virus on his or her hands can spread it by shaking hands with someone  The virus can stay on objects and surfaces for up to 3 days  You may become infected by touching the object or surface and then touching your eyes or mouth  Help lower the risk for COVID-19:   Wash your hands often throughout the day  Use soap and water  Rub your soapy hands together, lacing your fingers, for at least 20 seconds  Rinse with warm, running water  Dry your hands with a clean towel or paper towel  Use hand  that contains alcohol if soap and water are not available  Teach children how to wash their hands and use hand   Cover sneezes and coughs  Turn your face away and cover your mouth and nose with a tissue  Throw the tissue away  Use the bend of your arm if a tissue is not available  Then wash your hands well with soap and water or use hand   Teach children how to cover a cough or sneeze  Wear a face covering (mask) when needed  Use a cloth covering with at least 2 layers  You can also create layers by putting a cloth covering over a disposable non-medical mask   Cover your mouth and your nose          Follow worldwide, national, and local social distancing guidelines  Keep at least 6 feet (2 meters) between you and others  Try not to touch your face  If you get the virus on your hands, you can transfer it to your eyes, nose, or mouth and become infected  You can also transfer it to objects, surfaces, or people  Clean and disinfect high-touch surfaces and objects often  Use disinfecting wipes, or make a solution of 4 teaspoons of bleach in 1 quart (4 cups) of water  Ask about other vaccines you may need  Get the influenza (flu) vaccine as soon as recommended each year, usually starting in September or October  Get the pneumonia vaccine if recommended  Your healthcare provider can tell you if you should also get other vaccines, and when to get them  Follow social distancing guidelines:  National and local social distancing rules vary  Rules and restrictions may change over time as restrictions are lifted  The following are general guidelines:  Stay home if you are sick or think you may have COVID-19  It is important to stay home if you are waiting for a testing appointment or for test results  Avoid close physical contact with anyone who does not live in your home  Do not shake hands with, hug, or kiss a person as a greeting  If you must use public transportation (such as a bus or subway), try to sit or stand away from others  Wear your face covering  Avoid in-person gatherings and crowds  Attend virtually if possible  Follow up with your doctor as directed:  Write down your questions so you remember to ask them during your visits  For more information:   [facility] Prevention  [address] , [address]  Phone: 9- 262 - 172-9157  Web Address: [employer] com br    © Copyright [employer] 2022 Information is for End User's use only and may not be sold, redistributed or otherwise used for commercial purposes   All illustrations and images included in CareNotes® are the copyrighted property of [employer] A M [employer] Inc  or Olya Villegas   The above information is an  only  It is not intended as medical advice for individual conditions or treatments  Talk to your doctor, nurse or pharmacist before following any medical regimen to see if it is safe and effective for you

## 2022-12-13 DIAGNOSIS — K21.9 GASTROESOPHAGEAL REFLUX DISEASE WITHOUT ESOPHAGITIS: ICD-10-CM

## 2022-12-13 RX ORDER — OMEPRAZOLE 20 MG/1
20 CAPSULE, DELAYED RELEASE ORAL DAILY
Qty: 90 CAPSULE | Refills: 3 | Status: SHIPPED | OUTPATIENT
Start: 2022-12-13

## 2023-01-30 ENCOUNTER — HOSPITAL ENCOUNTER (OUTPATIENT)
Dept: MAMMOGRAPHY | Facility: MEDICAL CENTER | Age: 73
Discharge: HOME | End: 2023-01-30

## 2023-01-30 VITALS — HEIGHT: 66 IN | BODY MASS INDEX: 35.04 KG/M2 | WEIGHT: 218.03 LBS

## 2023-01-30 DIAGNOSIS — Z12.31 ENCOUNTER FOR SCREENING MAMMOGRAM FOR BREAST CANCER: ICD-10-CM

## 2023-03-20 ENCOUNTER — OFFICE VISIT (OUTPATIENT)
Dept: CARDIOLOGY CLINIC | Facility: CLINIC | Age: 73
End: 2023-03-20

## 2023-03-20 VITALS
OXYGEN SATURATION: 95 % | HEIGHT: 66 IN | BODY MASS INDEX: 35.68 KG/M2 | WEIGHT: 222 LBS | HEART RATE: 64 BPM | SYSTOLIC BLOOD PRESSURE: 140 MMHG | DIASTOLIC BLOOD PRESSURE: 80 MMHG

## 2023-03-20 DIAGNOSIS — I48.0 PAF (PAROXYSMAL ATRIAL FIBRILLATION) (HCC): Primary | ICD-10-CM

## 2023-03-20 DIAGNOSIS — I10 BENIGN ESSENTIAL HTN: ICD-10-CM

## 2023-03-20 NOTE — PROGRESS NOTES
Cardiology Consultation     Olimpia Moreno  [id number]  1950  [facility] CARDIOLOGY [place]  [address] [address]      1  PAF (paroxysmal atrial fibrillation) ([address] )        2  Benign essential HTN            Discussion/Summary:  Paroxysmal atrial fibrillation   -initially related to hyperthyroidism, but now having recurrent intermittent palpitations   -CHADS2 Vasc score of 3 (hypertension, female, agex1)  -reports cutting out alcohol as this was a major trigger  -2 week ZIO patch 09/13/2022 triggered episodes correlate with rare PACs, several episodes of SVT with longest lasting 14 beats which may represent atrial fibrillation versus atrial tachycardia  -not on NOAC due to GI bleed on full dose aspirin, currently tolerating aspirin 81 mg daily  -Offered patient to try NOAC, however patient declined  - She agreed to loop recorder to determine if these episodes of palpitations are paroxysmal atrial fibrillation or some other arrhythmia, messaged device clinic to arrange implantation  Hypertension  -on nebivolol 5 mg daily  -BP upper normal today, appears well controlled on prior visits  - Will continue to monitor for now  Post ablative hypothyroidism    History of Present Illness:  Olimpia Moreno is a 81y o  year old female with a past medical history of paroxysmal atrial fibrillation, hypertension and post ablative hypothyroidism  8/1/2022: She reports having a recurrent episode of palpitations in March of this year the last about 12 hours before resolving  Prior to that, she had 1 episode of palpitations last year  During that time she feels her heart is irregular and is very symptomatic  She has noted 1 of her triggers is alcohol use, so has been working on limiting her alcohol consumption  She is also concerned about sleep apnea  She reports her  noted her snoring loudly, and she has several to nighttime awakenings along with daytime somnolence    She does have a [employer] mobile, but has not set up yet  Otherwise denies any chest pain, headache, dizziness, syncope, dyspnea exertion or other cardiac symptoms  IntervalI history:     She reports feeling okay today  She did have an episode of palpitations about a month ago that lasted for about 8 hours before resolving  More recently she has been stressed out because her son overdosed again and was hospitalized in the ICU  Otherwise denies any episodes of chest pain, palpitations, lower extremity edema or other cardiac symptoms  She does he knowledge some occasional episodes of lightheadedness, but no episodes of syncope  Patient Active Problem List   Diagnosis   • PAF (paroxysmal atrial fibrillation) ([facility])   • Benign essential HTN   • Postablative hypothyroidism   • Fall from slipping on ice   • Closed fracture of medial malleolus of left tibia   • Bimalleolar ankle fracture, left, closed, initial encounter   • Peroneal tendonitis of left lower extremity   • Obesity, morbid ([facility])     Past Medical History:   Diagnosis Date   • Disease of thyroid gland    • Hypertension      Social History     Socioeconomic History   • Marital status: /Civil Union     Spouse name: Not on file   • Number of children: Not on file   • Years of education: Not on file   • Highest education level: Not on file   Occupational History   • Not on file   Tobacco Use   • Smoking status: Never     Passive exposure:  Yes   • Smokeless tobacco: Never   Vaping Use   • Vaping Use: Never used   Substance and Sexual Activity   • Alcohol use: No   • Drug use: No   • Sexual activity: Not Currently   Other Topics Concern   • Not on file   Social History Narrative   • Not on file     Social Determinants of Health     Financial Resource Strain: Not on file   Food Insecurity: Not on file   Transportation Needs: Not on file   Physical Activity: Not on file   Stress: Not on file   Social Connections: Not on file   Intimate Partner Violence: Not on file Housing Stability: Not on file      Family History   Problem Relation Age of Onset   • No Known Problems Mother    • Lung cancer Father 79   • No Known Problems Sister    • No Known Problems Daughter    • No Known Problems Maternal Grandmother    • No Known Problems Maternal Grandfather    • No Known Problems Paternal Grandmother    • No Known Problems Paternal Grandfather    • No Known Problems Maternal Aunt    • No Known Problems Maternal Aunt    • No Known Problems Maternal Aunt    • No Known Problems Maternal Aunt    • No Known Problems Paternal Aunt    • No Known Problems Paternal Aunt    • No Known Problems Paternal Aunt    • Coronary artery disease Family    • Colon cancer Cousin [de-identified]   • Lung cancer Cousin [de-identified]     Past Surgical History:   Procedure Laterality Date   • BREAST BIOPSY Right 1996    core bx  benign   • BREAST LUMPECTOMY Right 1990    benign   • BUNIONECTOMY Left    • HAND SURGERY Right     ganglion cyst removal   • ORIF ANKLE FRACTURE Right 2005   • ORIF TIBIA & FIBULA FRACTURES Left 2/13/2019    Procedure: OPEN REDUCTION W/ INTERNAL FIXATION (ORIF) ANKLE;  Surgeon: Dwight Norton DO;  Location: [facility];  Service: Orthopedics   • THYROIDECTOMY     • TONSILLECTOMY AND ADENOIDECTOMY         Current Outpatient Medications:   •  aspirin (ECOTRIN LOW STRENGTH) 81 mg EC tablet, Take 1 tablet (81 mg total) by mouth daily, Disp: , Rfl:   •  Cholecalciferol 50 MCG (2000 UT) CAPS, Take 2,000 Units by mouth daily , Disp: , Rfl:   •  levothyroxine 100 mcg tablet, Take 100 mcg by mouth daily, Disp: , Rfl: 2  •  Multiple Vitamins-Minerals (multivitamin with minerals) tablet, Take 1 tablet by mouth daily, Disp: , Rfl:   •  nebivolol (BYSTOLIC) 5 mg tablet, TAKE 1 TABLET BY MOUTH DAILY, Disp: 90 tablet, Rfl: 3  •  omeprazole (PriLOSEC) 20 mg delayed release capsule, Take 1 capsule (20 mg total) by mouth daily, Disp: 90 capsule, Rfl: 3    Current Facility-Administered Medications:   •  ondansetron (ZOFRAN-ODT) dispersible tablet 4 mg, 4 mg, Oral, Q6H PRN, Johnny Glass PA-C  No Known Allergies      Labs:  Lab Results   Component Value Date    ALT 25 2015    AST 19 2015    BUN 11 2019    CALCIUM 7 4 (L) 2019     (H) 2019    CO2 26 2019    CREATININE 0 71 2019    HCT 40 2 2019    HGB 12 9 2019    MG 2 0 2015     2019    K 3 7 2019     2015    WBC 9 84 2019       Imaging: No results found  EC2022 normal sinus rhythm, nonspecific T-wave changes    Review of Systems:  Review of Systems   Constitutional: Negative for chills, diaphoresis, fatigue and fever  HENT: Negative for congestion  Eyes: Negative for photophobia and visual disturbance  Respiratory: Negative for chest tightness and shortness of breath  Cardiovascular: Positive for palpitations  Negative for chest pain and leg swelling  Gastrointestinal: Negative for abdominal distention, abdominal pain, diarrhea, nausea and vomiting  Genitourinary: Negative for difficulty urinating and dysuria  Musculoskeletal: Negative for arthralgias, gait problem and joint swelling  Skin: Negative for color change, pallor and rash  Neurological: Positive for dizziness  Negative for syncope and numbness  Psychiatric/Behavioral: Negative for agitation, behavioral problems and confusion           Vitals:    23 1557   BP: 140/80   Pulse: 64   SpO2: 95%      Vitals:    23 1557   Weight: 101 kg (222 lb)     Height: 5' 6" (167 6 cm)     Physical Exam:  General appearance:  Appears stated age, alert, well appearing and in no distress  HEENT:  PERRLA, EOMI, no scleral icterus, no conjunctival pallor  NECK:  Supple, No elevated JVP, no thyromegaly, no carotid bruits  HEART:  Regular rate and rhythm, normal S1/S2, no S3/S4, no murmur or rub  LUNGS:  Clear to auscultation bilaterally  ABDOMEN:  Soft, non-tender, positive bowel sounds, no rebound or guarding, no organomegaly   EXTREMITIES:  No edema  VASCULAR:  Normal pedal pulses   SKIN: No lesions or rashes on exposed skin  NEURO:  CN II-XII intact, no focal deficits

## 2023-04-04 ENCOUNTER — TELEPHONE (OUTPATIENT)
Dept: CARDIOLOGY CLINIC | Facility: CLINIC | Age: 73
End: 2023-04-04

## 2023-04-04 NOTE — TELEPHONE ENCOUNTER
----- Message from Bill Jhaveri sent at 3/21/2023 10:34 AM EDT -----  Regarding: loop implant    ----- Message -----  From: Gi Cantor MD  Sent: 3/20/2023   4:23 PM EDT  To: Cardiology Cardiac Device Procedures    Hi this patient needs a loop recorder to monitor for paroxysmal atrial fibrillation  Can you please call her to arrange this procedure when you get a chance? Also please let me know if you need anything else    Thank you

## 2023-04-04 NOTE — TELEPHONE ENCOUNTER
I called and informed patient that I do not have any openings available at the [facility] but can schedule it at [place]  Patient states only wants to schedule at [facility] due to other locations are too far for her  Informed I will place on waitlist for [facility] and I will call her back once I have an opening        Added to [facility] waitlist      ThanksJanice

## 2023-09-13 ENCOUNTER — HOSPITAL ENCOUNTER (OUTPATIENT)
Dept: NON INVASIVE DIAGNOSTICS | Facility: HOSPITAL | Age: 73
Discharge: HOME | End: 2023-09-13
Attending: FAMILY MEDICINE
Payer: MEDICARE

## 2023-09-13 VITALS
DIASTOLIC BLOOD PRESSURE: 80 MMHG | SYSTOLIC BLOOD PRESSURE: 140 MMHG | BODY MASS INDEX: 35.68 KG/M2 | WEIGHT: 222 LBS | HEIGHT: 66 IN | HEART RATE: 66 BPM

## 2023-09-13 DIAGNOSIS — I48.0 PAF (PAROXYSMAL ATRIAL FIBRILLATION) (HCC): ICD-10-CM

## 2023-09-13 LAB
AORTIC ROOT: 3.2 CM
APICAL FOUR CHAMBER EJECTION FRACTION: 66 %
E WAVE DECELERATION TIME: 264 MS
FRACTIONAL SHORTENING: 38 (ref 28–44)
INTERVENTRICULAR SEPTUM IN DIASTOLE (PARASTERNAL SHORT AXIS VIEW): 1 CM
INTERVENTRICULAR SEPTUM: 1 CM (ref 0.6–1.1)
LAAS-AP2: 18.9 CM2
LAAS-AP4: 23.8 CM2
LEFT ATRIUM SIZE: 5.1 CM
LEFT ATRIUM VOLUME (MOD BIPLANE): 62 ML
LEFT INTERNAL DIMENSION IN SYSTOLE: 2.8 CM (ref 2.1–4)
LEFT VENTRICULAR INTERNAL DIMENSION IN DIASTOLE: 4.5 CM (ref 3.5–6)
LEFT VENTRICULAR POSTERIOR WALL IN END DIASTOLE: 0.9 CM
LEFT VENTRICULAR STROKE VOLUME: 63 ML
LVSV (TEICH): 63 ML
MV E'TISSUE VEL-SEP: 6 CM/S
MV PEAK A VEL: 0.7 M/S
MV PEAK E VEL: 49 CM/S
MV STENOSIS PRESSURE HALF TIME: 77 MS
MV VALVE AREA P 1/2 METHOD: 2.86
RA PRESSURE ESTIMATED: 5 MMHG
RIGHT ATRIUM AREA SYSTOLE A4C: 15.5 CM2
RIGHT VENTRICLE ID DIMENSION: 3 CM
RV PSP: 32 MMHG
SL CV LEFT ATRIUM LENGTH A2C: 5.9 CM
SL CV LV EF: 65
SL CV PED ECHO LEFT VENTRICLE DIASTOLIC VOLUME (MOD BIPLANE) 2D: 94 ML
SL CV PED ECHO LEFT VENTRICLE SYSTOLIC VOLUME (MOD BIPLANE) 2D: 31 ML
TR MAX PG: 27 MMHG
TR PEAK VELOCITY: 2.6 M/S
TRICUSPID ANNULAR PLANE SYSTOLIC EXCURSION: 2.1 CM
TRICUSPID VALVE PEAK REGURGITATION VELOCITY: 2.6 M/S

## 2023-09-13 PROCEDURE — 93306 TTE W/DOPPLER COMPLETE: CPT

## 2023-09-13 PROCEDURE — 93306 TTE W/DOPPLER COMPLETE: CPT | Performed by: STUDENT IN AN ORGANIZED HEALTH CARE EDUCATION/TRAINING PROGRAM

## 2023-10-03 ENCOUNTER — PREP FOR PROCEDURE (OUTPATIENT)
Dept: CARDIOLOGY CLINIC | Facility: CLINIC | Age: 73
End: 2023-10-03

## 2023-10-03 DIAGNOSIS — I48.0 PAF (PAROXYSMAL ATRIAL FIBRILLATION) (HCC): Primary | ICD-10-CM

## 2023-10-04 ENCOUNTER — APPOINTMENT (OUTPATIENT)
Dept: LAB | Facility: HOSPITAL | Age: 73
End: 2023-10-04
Payer: MEDICARE

## 2023-10-04 LAB
ALBUMIN SERPL BCP-MCNC: 4.1 G/DL (ref 3.5–5)
ALP SERPL-CCNC: 68 U/L (ref 34–104)
ALT SERPL W P-5'-P-CCNC: 13 U/L (ref 7–52)
ANION GAP SERPL CALCULATED.3IONS-SCNC: 5 MMOL/L
AST SERPL W P-5'-P-CCNC: 16 U/L (ref 13–39)
BASOPHILS # BLD AUTO: 0.04 THOUSANDS/ÂΜL (ref 0–0.1)
BASOPHILS NFR BLD AUTO: 1 % (ref 0–1)
BILIRUB SERPL-MCNC: 0.61 MG/DL (ref 0.2–1)
BUN SERPL-MCNC: 14 MG/DL (ref 5–25)
CALCIUM SERPL-MCNC: 8.2 MG/DL (ref 8.4–10.2)
CHLORIDE SERPL-SCNC: 105 MMOL/L (ref 96–108)
CO2 SERPL-SCNC: 28 MMOL/L (ref 21–32)
CREAT SERPL-MCNC: 0.6 MG/DL (ref 0.6–1.3)
EOSINOPHIL # BLD AUTO: 0.14 THOUSAND/ÂΜL (ref 0–0.61)
EOSINOPHIL NFR BLD AUTO: 2 % (ref 0–6)
ERYTHROCYTE [DISTWIDTH] IN BLOOD BY AUTOMATED COUNT: 13 % (ref 11.6–15.1)
GFR SERPL CREATININE-BSD FRML MDRD: 92 ML/MIN/1.73SQ M
GLUCOSE SERPL-MCNC: 98 MG/DL (ref 65–140)
HCT VFR BLD AUTO: 43.7 % (ref 34.8–46.1)
HGB BLD-MCNC: 14.2 G/DL (ref 11.5–15.4)
IMM GRANULOCYTES # BLD AUTO: 0.02 THOUSAND/UL (ref 0–0.2)
IMM GRANULOCYTES NFR BLD AUTO: 0 % (ref 0–2)
LYMPHOCYTES # BLD AUTO: 2.11 THOUSANDS/ÂΜL (ref 0.6–4.47)
LYMPHOCYTES NFR BLD AUTO: 29 % (ref 14–44)
MCH RBC QN AUTO: 29.7 PG (ref 26.8–34.3)
MCHC RBC AUTO-ENTMCNC: 32.5 G/DL (ref 31.4–37.4)
MCV RBC AUTO: 91 FL (ref 82–98)
MONOCYTES # BLD AUTO: 0.62 THOUSAND/ÂΜL (ref 0.17–1.22)
MONOCYTES NFR BLD AUTO: 9 % (ref 4–12)
NEUTROPHILS # BLD AUTO: 4.31 THOUSANDS/ÂΜL (ref 1.85–7.62)
NEUTS SEG NFR BLD AUTO: 59 % (ref 43–75)
NRBC BLD AUTO-RTO: 0 /100 WBCS
PLATELET # BLD AUTO: 264 THOUSANDS/UL (ref 149–390)
PMV BLD AUTO: 9.9 FL (ref 8.9–12.7)
POTASSIUM SERPL-SCNC: 4.1 MMOL/L (ref 3.5–5.3)
PROT SERPL-MCNC: 6.6 G/DL (ref 6.4–8.4)
RBC # BLD AUTO: 4.78 MILLION/UL (ref 3.81–5.12)
SODIUM SERPL-SCNC: 138 MMOL/L (ref 135–147)
WBC # BLD AUTO: 7.24 THOUSAND/UL (ref 4.31–10.16)

## 2023-10-18 ENCOUNTER — HOSPITAL ENCOUNTER (OUTPATIENT)
Facility: HOSPITAL | Age: 73
Setting detail: OUTPATIENT SURGERY
Discharge: HOME | End: 2023-10-18
Attending: STUDENT IN AN ORGANIZED HEALTH CARE EDUCATION/TRAINING PROGRAM | Admitting: STUDENT IN AN ORGANIZED HEALTH CARE EDUCATION/TRAINING PROGRAM
Payer: MEDICARE

## 2023-10-18 VITALS
BODY MASS INDEX: 35.2 KG/M2 | RESPIRATION RATE: 17 BRPM | HEART RATE: 62 BPM | SYSTOLIC BLOOD PRESSURE: 146 MMHG | OXYGEN SATURATION: 96 % | WEIGHT: 219 LBS | DIASTOLIC BLOOD PRESSURE: 91 MMHG | TEMPERATURE: 96.7 F | HEIGHT: 66 IN

## 2023-10-18 DIAGNOSIS — I48.0 PAF (PAROXYSMAL ATRIAL FIBRILLATION) (HCC): ICD-10-CM

## 2023-10-18 PROCEDURE — NC001 PR NO CHARGE: Performed by: PHYSICIAN ASSISTANT

## 2023-10-18 PROCEDURE — 33285 INSJ SUBQ CAR RHYTHM MNTR: CPT | Performed by: PHYSICIAN ASSISTANT

## 2023-10-18 PROCEDURE — 33285 INSJ SUBQ CAR RHYTHM MNTR: CPT | Performed by: STUDENT IN AN ORGANIZED HEALTH CARE EDUCATION/TRAINING PROGRAM

## 2023-10-18 PROCEDURE — C1764 EVENT RECORDER, CARDIAC: HCPCS | Performed by: STUDENT IN AN ORGANIZED HEALTH CARE EDUCATION/TRAINING PROGRAM

## 2023-10-18 DEVICE — ICM LNQ22 LINQ II USA
Type: IMPLANTABLE DEVICE | Site: CHEST  WALL | Status: FUNCTIONAL
Brand: LINQ II™

## 2023-10-18 RX ORDER — LIDOCAINE HYDROCHLORIDE 10 MG/ML
INJECTION, SOLUTION EPIDURAL; INFILTRATION; INTRACAUDAL; PERINEURAL CODE/TRAUMA/SEDATION MEDICATION
Status: DISCONTINUED | OUTPATIENT
Start: 2023-10-18 | End: 2023-10-18 | Stop reason: HOSPADM

## 2023-10-18 NOTE — H&P
Patient presents to the hospital for loop recorder implantation for paroxysmal atrial fibrillation. She has GI bleed in the past and thus is off of her anticoagulation. She will have loop recorder implanted to better determine if she is having episodes of a fib or some other arrhythmia.

## 2023-10-18 NOTE — DISCHARGE INSTR - AVS FIRST PAGE
LOOP RECORDER INSTRUCTIONS:  - Keep loop recorder incision dry for one week. Do not use lotions, powders, creams, or ointments on incision.     - Remove outer bandage 24-48 hours after procedure. There is waterproof glue present over the incision. This should keep the incision dry. Avoid picking at the glue or peeling it off. The glue will come off in its own time.     - Because the glue is waterproof, it is safe to shower if the glue remains on over the incision. It is ok to let the soap and water gently run over the incision. Avoid direct exposure to the stream of water. Do not soak the incision. Do not scrub. Pat dry. - If the glue comes off in less that 7 days, place a waterproof bandage over the incision before showering.    - If present, leave underlying steri-strips in place. They will either fall off on their own or will be removed at the 2 week follow up appointment. If present, leave stitches in place. They will be removed at the 2 week follow up appointment. - Please call the office if you notice redness, swelling, bleeding, or drainage from incision or if you develop fevers. Cardiac Loop Recorder Insertion      WHAT YOU SHOULD KNOW:    A cardiac loop recorder is a device used to diagnose heart rhythm problems, such as a fast or irregular heartbeat. It is implanted in your left chest, just under the skin. The device records a pattern of your heart's rhythm, called an EKG. Your device records automatic EKGs, depending on how your caregiver programs it. You may also receive a handheld controller. You press a button on the controller when you have symptoms, such as dizziness, lightheadedness, or palpitations. The device will record an EKG at that moment. The recording can help your caregiver see if your symptoms may be caused by heart rhythm problems. Your caregiver will remove the device after it has collected enough data. You may need the device for up to 3 years.  The procedure to remove the device is similar to the procedure used to implant it. AFTER YOU LEAVE:    Follow up with your cardiologist as directed: You will need to present to the office in 2 weeks. A nurse at the device clinic will check your incision and remove any stitches or steri strips that may be present. They may also program your device settings again. They will retrieve data from the device every 1 to 3 months with a monitor held over your skin. You may be able to transmit data from your device from home as well. You will do this by calling a number provided by the device clinic or as they have instructed you. Ask for information about this process. Write down your questions so you remember to ask them during your visits. Wound care: Keep loop recorder incision dry for one week. Do not use lotions, powders, creams, or ointments on incision. Remove outer bandage 24-48 hours after procedure. There is waterproof glue present over the incision. This should keep the incision dry. Avoid picking at the glue or peeling it off. The glue will come off in its own time. Because the glue is waterproof, it is safe to shower if the glue remains on over the incision. It is ok to let the soap and water gently run over the incision. Avoid direct exposure to the stream of water. Do not soak the incision. Do not scrub. Pat dry. If the glue comes off in less that 7 days, place a waterproof bandage over the incision before showering. If present, leave underlying steri-strips in place. They will either fall off on their own or will be removed at the 2 week follow up appointment. If present, leave stitches in place. They will be removed at the 2 week follow up appointment. Please call the office if you notice redness, swelling, bleeding, or drainage from incision or if you develop fevers. Return to activity: If you received anesthesia, you will not be able to drive for 24 hours.  Otherwise, most people can return to normal activities soon after the procedure. Your cardiologist may want to know if your work involves electrical current or high-voltage equipment. Ask about other electrical items that could interfere with your cardiac loop recorder. Contact your cardiologist if:   You have a fever or chills. Your wound is red, swollen, or draining pus. You have questions or concerns about your condition or care. Seek care immediately or call 911 if: You feel weak, dizzy, or faint. You lose consciousness. © 2014 [address] [facility] is for End User's use only and may not be sold, redistributed or otherwise used for commercial purposes. All illustrations and images included in CareNotes® are the copyrighted property of [employer] or [employer] Ham. The above information is an  only. It is not intended as medical advice for individual conditions or treatments. Talk to your doctor, nurse or pharmacist before following any medical regimen to see if it is safe and effective for you.

## 2023-10-26 ENCOUNTER — TELEPHONE (OUTPATIENT)
Dept: CARDIOLOGY CLINIC | Facility: CLINIC | Age: 73
End: 2023-10-26

## 2023-10-26 NOTE — TELEPHONE ENCOUNTER
S/W pt, stated loop site is black & blue along with some discomfort and seems to be noticeable. Informed that site will be addressed at in clinic on 11/2 and symptoms should subside since device was recently placed.

## 2023-11-02 ENCOUNTER — IN-CLINIC DEVICE VISIT (OUTPATIENT)
Dept: CARDIOLOGY CLINIC | Facility: CLINIC | Age: 73
End: 2023-11-02
Payer: MEDICARE

## 2023-11-02 DIAGNOSIS — Z95.818 PRESENCE OF OTHER CARDIAC IMPLANTS AND GRAFTS: Primary | ICD-10-CM

## 2023-11-02 PROCEDURE — 93291 INTERROG DEV EVAL SCRMS IP: CPT | Performed by: INTERNAL MEDICINE

## 2023-11-02 NOTE — PROGRESS NOTES
Results for orders placed or performed in visit on 11/02/23   Cardiac EP device report    Narrative    MDT LOOP II/ACTIVE SYSTEM IS MRI CONDITIONAL  DEVICE INTERROGATED IN THE [facility] OFFICE. BATTERY VOLTAGE ADEQUATE (GOOD). PRESENTING RHYTHM NS 68 BPM. NO PATIENT OR DEVICE ACTIVATED EPISODES. WOUND CHECK: INCISION CLEAN AND DRY WITH EDGES APPROXIMATED; SUTURES REMOVED; WOUND CARE AND RESTRICTIONS REVIEWED WITH PATIENT. NORMAL DEVICE FUNCTION.  AM/VALDEZ

## 2023-11-16 ENCOUNTER — TELEPHONE (OUTPATIENT)
Dept: CARDIOLOGY CLINIC | Facility: CLINIC | Age: 73
End: 2023-11-16

## 2023-11-16 NOTE — TELEPHONE ENCOUNTER
Patient calling stating that her loop recorder is "sticking out and very noticeable. It has a "burning  sensation". She feels something is not right and was calling for Dr Larry Burnette. I suggested she call the device clinic and report. Someone may need to take a look at it. She states she complained on 11/2 and was told to call cardiologist if she continued to have problems.

## 2023-11-17 ENCOUNTER — CLINICAL SUPPORT (OUTPATIENT)
Dept: CARDIOLOGY CLINIC | Facility: CLINIC | Age: 73
End: 2023-11-17

## 2023-11-17 DIAGNOSIS — Z51.89 ENCOUNTER FOR WOUND CARE: Primary | ICD-10-CM

## 2023-11-17 PROCEDURE — RECHECK: Performed by: PHYSICIAN ASSISTANT

## 2023-11-17 NOTE — PROGRESS NOTES
Nehal Thomas came to the office today for a site check. Had concerns of loop sticking out and "felt it moving". Also has intermittent burning sensation in the area which is worse when moving arm. Loop implanted on 10/18/23. Pt concerned about the burning sensation as she had a h/o similar burning sensation from metal plates from previous ortho procedures where they had to remove plates. Site checked by Juanjo Bay PA-C, in office. No erythema noted. Discussed about the loop placement and burning sensation. Plan is to see pt again for site check in a month when Dr. Luis Brennan is in office to f/u.

## 2023-11-25 DIAGNOSIS — K21.9 GASTROESOPHAGEAL REFLUX DISEASE WITHOUT ESOPHAGITIS: ICD-10-CM

## 2023-11-27 RX ORDER — OMEPRAZOLE 20 MG/1
20 CAPSULE, DELAYED RELEASE ORAL DAILY
Qty: 90 CAPSULE | Refills: 3 | Status: SHIPPED | OUTPATIENT
Start: 2023-11-27

## 2023-12-18 ENCOUNTER — TELEPHONE (OUTPATIENT)
Dept: CARDIOLOGY CLINIC | Facility: CLINIC | Age: 73
End: 2023-12-18

## 2023-12-18 NOTE — TELEPHONE ENCOUNTER
Patient calling stating she is under stress with job and experiencing palpitations.  She wanted to know what to do?  But after a long discussion, patient was put on Metoprolol by PCP for this and never took the medication.  I told her it was prescribed so she should take the medication but she wanted Dr Arguello's okay.  He is unavailable for a while.  Advised she take the Metoprolol as directed by PCP which is 25 mgs bid.  Patient agrees to take.

## 2024-02-05 ENCOUNTER — REMOTE DEVICE CLINIC VISIT (OUTPATIENT)
Dept: CARDIOLOGY CLINIC | Facility: CLINIC | Age: 74
End: 2024-02-05
Payer: MEDICARE

## 2024-02-05 ENCOUNTER — CLINICAL SUPPORT (OUTPATIENT)
Dept: CARDIOLOGY CLINIC | Facility: CLINIC | Age: 74
End: 2024-02-05
Payer: MEDICARE

## 2024-02-05 DIAGNOSIS — Z95.818 PRESENCE OF OTHER CARDIAC IMPLANTS AND GRAFTS: Primary | ICD-10-CM

## 2024-02-05 DIAGNOSIS — Z51.89 VISIT FOR WOUND CHECK: Primary | ICD-10-CM

## 2024-02-05 PROCEDURE — 99211 OFF/OP EST MAY X REQ PHY/QHP: CPT | Performed by: PHYSICIAN ASSISTANT

## 2024-02-05 PROCEDURE — 93298 REM INTERROG DEV EVAL SCRMS: CPT | Performed by: INTERNAL MEDICINE

## 2024-02-05 NOTE — PROGRESS NOTES
"MDT LOOP II/ACTIVE SYSTEM IS MRI CONDITIONAL   CARELINK TRANSMISSION: LOOP RECORDER. PRESENTING RHYTHM NSR @ 60 BPM. BATTERY STATUS \"OK.\" NO PATIENT OR DEVICE ACTIVATED EPISODES. HOWEVER THERE IS AN EGRAM SHOWING AF. No AC meds due to GI bleeds. NORMAL DEVICE FUNCTION. DL   "

## 2024-02-05 NOTE — PROGRESS NOTES
Patient stopped by office to have her loop implant site checked.  Loop was implanted in October 2023.  She complained initially of some burning sensation but this is gone.  She was concerned because she felt it underneath the skin and I did explain that this is where it is supposed to be.  She had multiple questions about how the loop was checked which I did explain to patient.    Patient was also seen and evaluated by Valentine Muller PA-C.

## 2024-02-15 ENCOUNTER — APPOINTMENT (EMERGENCY)
Dept: CT IMAGING | Facility: HOSPITAL | Age: 74
End: 2024-02-15
Payer: MEDICARE

## 2024-02-15 ENCOUNTER — HOSPITAL ENCOUNTER (INPATIENT)
Facility: HOSPITAL | Age: 74
LOS: 2 days | Discharge: HOME | DRG: 605 | End: 2024-02-17
Attending: SURGERY | Admitting: SURGERY
Payer: COMMERCIAL

## 2024-02-15 ENCOUNTER — APPOINTMENT (EMERGENCY)
Dept: RADIOLOGY | Facility: HOSPITAL | Age: 74
End: 2024-02-15
Payer: MEDICARE

## 2024-02-15 ENCOUNTER — HOSPITAL ENCOUNTER (EMERGENCY)
Facility: HOSPITAL | Age: 74
End: 2024-02-15
Attending: EMERGENCY MEDICINE | Admitting: EMERGENCY MEDICINE
Payer: MEDICARE

## 2024-02-15 VITALS
TEMPERATURE: 97.8 F | DIASTOLIC BLOOD PRESSURE: 70 MMHG | RESPIRATION RATE: 18 BRPM | SYSTOLIC BLOOD PRESSURE: 168 MMHG | OXYGEN SATURATION: 98 % | HEART RATE: 86 BPM

## 2024-02-15 DIAGNOSIS — S30.1XXA ABDOMINAL WALL HEMATOMA, INITIAL ENCOUNTER: ICD-10-CM
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
## 2024-02-15 DIAGNOSIS — S20.219A CHEST WALL HEMATOMA: ICD-10-CM

## 2024-02-15 DIAGNOSIS — R91.1 PULMONARY NODULE: ICD-10-CM

## 2024-02-15 DIAGNOSIS — S30.1XXA ABDOMINAL WALL HEMATOMA, INITIAL ENCOUNTER: Primary | ICD-10-CM

## 2024-02-15 PROBLEM — R91.8 OPACITY OF LUNG ON IMAGING STUDY: Status: ACTIVE | Noted: 2024-02-15

## 2024-02-15 PROBLEM — G89.11 ACUTE PAIN DUE TO TRAUMA: Status: ACTIVE | Noted: 2024-02-15

## 2024-02-15 LAB
2HR DELTA HS TROPONIN: 0 NG/L
ABO GROUP BLD: NORMAL
ANION GAP SERPL CALCULATED.3IONS-SCNC: 10 MMOL/L
ANION GAP SERPL CALCULATED.3IONS-SCNC: 8 MMOL/L
BASOPHILS # BLD AUTO: 0.04 THOUSANDS/ÂΜL (ref 0–0.1)
BASOPHILS # BLD AUTO: 0.04 THOUSANDS/ÂΜL (ref 0–0.1)
BASOPHILS NFR BLD AUTO: 0 % (ref 0–1)
BASOPHILS NFR BLD AUTO: 0 % (ref 0–1)
BLD GP AB SCN SERPL QL: NEGATIVE
BUN SERPL-MCNC: 12 MG/DL (ref 5–25)
BUN SERPL-MCNC: 12 MG/DL (ref 5–25)
CALCIUM SERPL-MCNC: 8.1 MG/DL (ref 8.4–10.2)
CALCIUM SERPL-MCNC: 8.3 MG/DL (ref 8.4–10.2)
CARDIAC TROPONIN I PNL SERPL HS: 4 NG/L
CARDIAC TROPONIN I PNL SERPL HS: 4 NG/L
CHLORIDE SERPL-SCNC: 104 MMOL/L (ref 96–108)
CHLORIDE SERPL-SCNC: 106 MMOL/L (ref 96–108)
CO2 SERPL-SCNC: 25 MMOL/L (ref 21–32)
CO2 SERPL-SCNC: 26 MMOL/L (ref 21–32)
CREAT SERPL-MCNC: 0.68 MG/DL (ref 0.6–1.3)
CREAT SERPL-MCNC: 0.71 MG/DL (ref 0.6–1.3)
EOSINOPHIL # BLD AUTO: 0.01 THOUSAND/ÂΜL (ref 0–0.61)
EOSINOPHIL # BLD AUTO: 0.02 THOUSAND/ÂΜL (ref 0–0.61)
EOSINOPHIL NFR BLD AUTO: 0 % (ref 0–6)
EOSINOPHIL NFR BLD AUTO: 0 % (ref 0–6)
ERYTHROCYTE [DISTWIDTH] IN BLOOD BY AUTOMATED COUNT: 13.1 % (ref 11.6–15.1)
ERYTHROCYTE [DISTWIDTH] IN BLOOD BY AUTOMATED COUNT: 13.2 % (ref 11.6–15.1)
GFR SERPL CREATININE-BSD FRML MDRD: 85 ML/MIN/1.73SQ M
GFR SERPL CREATININE-BSD FRML MDRD: 88 ML/MIN/1.73SQ M
GLUCOSE SERPL-MCNC: 100 MG/DL (ref 65–140)
GLUCOSE SERPL-MCNC: 135 MG/DL (ref 65–140)
HCT VFR BLD AUTO: 43 % (ref 34.8–46.1)
HCT VFR BLD AUTO: 43.9 % (ref 34.8–46.1)
HGB BLD-MCNC: 14.3 G/DL (ref 11.5–15.4)
HGB BLD-MCNC: 14.6 G/DL (ref 11.5–15.4)
IMM GRANULOCYTES # BLD AUTO: 0.07 THOUSAND/UL (ref 0–0.2)
IMM GRANULOCYTES # BLD AUTO: 0.07 THOUSAND/UL (ref 0–0.2)
IMM GRANULOCYTES NFR BLD AUTO: 0 % (ref 0–2)
IMM GRANULOCYTES NFR BLD AUTO: 1 % (ref 0–2)
LYMPHOCYTES # BLD AUTO: 1.34 THOUSANDS/ÂΜL (ref 0.6–4.47)
LYMPHOCYTES # BLD AUTO: 1.73 THOUSANDS/ÂΜL (ref 0.6–4.47)
LYMPHOCYTES NFR BLD AUTO: 13 % (ref 14–44)
LYMPHOCYTES NFR BLD AUTO: 8 % (ref 14–44)
MCH RBC QN AUTO: 29.3 PG (ref 26.8–34.3)
MCH RBC QN AUTO: 30 PG (ref 26.8–34.3)
MCHC RBC AUTO-ENTMCNC: 33.3 G/DL (ref 31.4–37.4)
MCHC RBC AUTO-ENTMCNC: 33.3 G/DL (ref 31.4–37.4)
MCV RBC AUTO: 88 FL (ref 82–98)
MCV RBC AUTO: 90 FL (ref 82–98)
MONOCYTES # BLD AUTO: 0.94 THOUSAND/ÂΜL (ref 0.17–1.22)
MONOCYTES # BLD AUTO: 1.07 THOUSAND/ÂΜL (ref 0.17–1.22)
MONOCYTES NFR BLD AUTO: 6 % (ref 4–12)
MONOCYTES NFR BLD AUTO: 8 % (ref 4–12)
NEUTROPHILS # BLD AUTO: 10.36 THOUSANDS/ÂΜL (ref 1.85–7.62)
NEUTROPHILS # BLD AUTO: 13.67 THOUSANDS/ÂΜL (ref 1.85–7.62)
NEUTS SEG NFR BLD AUTO: 78 % (ref 43–75)
NEUTS SEG NFR BLD AUTO: 86 % (ref 43–75)
NRBC BLD AUTO-RTO: 0 /100 WBCS
NRBC BLD AUTO-RTO: 0 /100 WBCS
PLATELET # BLD AUTO: 277 THOUSANDS/UL (ref 149–390)
PLATELET # BLD AUTO: 287 THOUSANDS/UL (ref 149–390)
PMV BLD AUTO: 9.6 FL (ref 8.9–12.7)
PMV BLD AUTO: 9.8 FL (ref 8.9–12.7)
POTASSIUM SERPL-SCNC: 3.6 MMOL/L (ref 3.5–5.3)
POTASSIUM SERPL-SCNC: 3.7 MMOL/L (ref 3.5–5.3)
RBC # BLD AUTO: 4.87 MILLION/UL (ref 3.81–5.12)
RBC # BLD AUTO: 4.88 MILLION/UL (ref 3.81–5.12)
RH BLD: POSITIVE
SODIUM SERPL-SCNC: 139 MMOL/L (ref 135–147)
SODIUM SERPL-SCNC: 140 MMOL/L (ref 135–147)
SPECIMEN EXPIRATION DATE: NORMAL
WBC # BLD AUTO: 13.28 THOUSAND/UL (ref 4.31–10.16)
WBC # BLD AUTO: 16.08 THOUSAND/UL (ref 4.31–10.16)

## 2024-02-15 PROCEDURE — 36415 COLL VENOUS BLD VENIPUNCTURE: CPT | Performed by: PHYSICIAN ASSISTANT

## 2024-02-15 PROCEDURE — 86901 BLOOD TYPING SEROLOGIC RH(D): CPT

## 2024-02-15 PROCEDURE — 85025 COMPLETE CBC W/AUTO DIFF WBC: CPT

## 2024-02-15 PROCEDURE — 90471 IMMUNIZATION ADMIN: CPT

## 2024-02-15 PROCEDURE — 70450 CT HEAD/BRAIN W/O DYE: CPT

## 2024-02-15 PROCEDURE — 73130 X-RAY EXAM OF HAND: CPT

## 2024-02-15 PROCEDURE — 99285 EMERGENCY DEPT VISIT HI MDM: CPT

## 2024-02-15 PROCEDURE — 84484 ASSAY OF TROPONIN QUANT: CPT

## 2024-02-15 PROCEDURE — 99223 1ST HOSP IP/OBS HIGH 75: CPT | Performed by: SURGERY

## 2024-02-15 PROCEDURE — G1004 CDSM NDSC: HCPCS

## 2024-02-15 PROCEDURE — 85025 COMPLETE CBC W/AUTO DIFF WBC: CPT | Performed by: PHYSICIAN ASSISTANT

## 2024-02-15 PROCEDURE — 90715 TDAP VACCINE 7 YRS/> IM: CPT

## 2024-02-15 PROCEDURE — 71260 CT THORAX DX C+: CPT

## 2024-02-15 PROCEDURE — 99284 EMERGENCY DEPT VISIT MOD MDM: CPT

## 2024-02-15 PROCEDURE — 96375 TX/PRO/DX INJ NEW DRUG ADDON: CPT

## 2024-02-15 PROCEDURE — 93005 ELECTROCARDIOGRAM TRACING: CPT

## 2024-02-15 PROCEDURE — 86900 BLOOD TYPING SEROLOGIC ABO: CPT

## 2024-02-15 PROCEDURE — 71250 CT THORAX DX C-: CPT

## 2024-02-15 PROCEDURE — 80048 BASIC METABOLIC PNL TOTAL CA: CPT

## 2024-02-15 PROCEDURE — 72125 CT NECK SPINE W/O DYE: CPT

## 2024-02-15 PROCEDURE — 74177 CT ABD & PELVIS W/CONTRAST: CPT

## 2024-02-15 PROCEDURE — G0168 WOUND CLOSURE BY ADHESIVE: HCPCS | Performed by: PHYSICIAN ASSISTANT

## 2024-02-15 PROCEDURE — 73564 X-RAY EXAM KNEE 4 OR MORE: CPT

## 2024-02-15 PROCEDURE — 99285 EMERGENCY DEPT VISIT HI MDM: CPT | Performed by: PHYSICIAN ASSISTANT

## 2024-02-15 PROCEDURE — 86850 RBC ANTIBODY SCREEN: CPT

## 2024-02-15 PROCEDURE — 80048 BASIC METABOLIC PNL TOTAL CA: CPT | Performed by: PHYSICIAN ASSISTANT

## 2024-02-15 PROCEDURE — 96374 THER/PROPH/DIAG INJ IV PUSH: CPT

## 2024-02-15 RX ORDER — LEVOTHYROXINE SODIUM 0.1 MG/1
100 TABLET ORAL
Status: DISCONTINUED | OUTPATIENT
Start: 2024-02-16 | End: 2024-02-17 | Stop reason: HOSPADM

## 2024-02-15 RX ORDER — HYDROMORPHONE HCL IN WATER/PF 6 MG/30 ML
0.2 PATIENT CONTROLLED ANALGESIA SYRINGE INTRAVENOUS EVERY 2 HOUR PRN
Status: DISCONTINUED | OUTPATIENT
Start: 2024-02-15 | End: 2024-02-17 | Stop reason: HOSPADM

## 2024-02-15 RX ORDER — OXYCODONE HYDROCHLORIDE 5 MG/1
5 TABLET ORAL EVERY 4 HOURS PRN
Status: DISCONTINUED | OUTPATIENT
Start: 2024-02-15 | End: 2024-02-17 | Stop reason: HOSPADM

## 2024-02-15 RX ORDER — DIAZEPAM 5 MG/ML
2.5 INJECTION, SOLUTION INTRAMUSCULAR; INTRAVENOUS ONCE
Status: COMPLETED | OUTPATIENT
Start: 2024-02-15 | End: 2024-02-15

## 2024-02-15 RX ORDER — NEBIVOLOL 5 MG/1
5 TABLET ORAL DAILY
Status: DISCONTINUED | OUTPATIENT
Start: 2024-02-16 | End: 2024-02-17 | Stop reason: HOSPADM

## 2024-02-15 RX ORDER — CHOLECALCIFEROL (VITAMIN D3) 50 MCG
2000 TABLET ORAL DAILY
COMMUNITY

## 2024-02-15 RX ORDER — PANTOPRAZOLE SODIUM 40 MG/1
40 TABLET, DELAYED RELEASE ORAL
Status: DISCONTINUED | OUTPATIENT
Start: 2024-02-16 | End: 2024-02-17 | Stop reason: HOSPADM

## 2024-02-15 RX ORDER — ACETAMINOPHEN 325 MG/1
650 TABLET ORAL EVERY 4 HOURS PRN
Status: DISCONTINUED | OUTPATIENT
Start: 2024-02-15 | End: 2024-02-17 | Stop reason: HOSPADM

## 2024-02-15 RX ADMIN — ACETAMINOPHEN 650 MG: 325 TABLET, FILM COATED ORAL at 21:41

## 2024-02-15 RX ADMIN — DIAZEPAM 2.5 MG: 10 INJECTION, SOLUTION INTRAMUSCULAR; INTRAVENOUS at 16:37

## 2024-02-15 RX ADMIN — DESMOPRESSIN ACETATE 26.67 MCG: 4 SOLUTION INTRAVENOUS at 21:44

## 2024-02-15 RX ADMIN — HYDROMORPHONE HYDROCHLORIDE 0.2 MG: 0.2 INJECTION, SOLUTION INTRAMUSCULAR; INTRAVENOUS; SUBCUTANEOUS at 23:17

## 2024-02-15 RX ADMIN — TETANUS TOXOID, REDUCED DIPHTHERIA TOXOID AND ACELLULAR PERTUSSIS VACCINE, ADSORBED 0.5 ML: 5; 2.5; 8; 8; 2.5 SUSPENSION INTRAMUSCULAR at 20:11

## 2024-02-15 RX ADMIN — OXYCODONE HYDROCHLORIDE 5 MG: 5 TABLET ORAL at 21:40

## 2024-02-15 RX ADMIN — MORPHINE SULFATE 2 MG: 2 INJECTION, SOLUTION INTRAMUSCULAR; INTRAVENOUS at 15:24

## 2024-02-15 RX ADMIN — IOHEXOL 100 ML: 350 INJECTION, SOLUTION INTRAVENOUS at 16:22
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
## 2024-02-15 NOTE — ED NOTES
Pt notified staff around 1500 that she noticed some pain and bruising to LLQ. Area with dark bruising and swelling. Very hard upon palpation. Provider already bedside and ordered additional studies.      Rebekah Drake RN  02/15/24 0573

## 2024-02-15 NOTE — ASSESSMENT & PLAN NOTE
- CT chest: Large hematoma in the subcutaneous fat of the right anterior chest wall with no acute displaced rib or costal cartilage fracture.   - check frequently for size  - serial H&H  - consider EKG and troponins
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
## 2024-02-15 NOTE — ASSESSMENT & PLAN NOTE
- CT abdomen pelvis: Large left lower quadrant anterior abdominal wall contusion with more focal small organized hematoma centrally, within which there is a curvilinear blush of contrast, indicating active extravasation, noting that differentiation between arterial and   venous extravasation cannot be made on this single phase study.  - Abdomen binder placed  - serial q4 H &H  - Consider IR consult  - serial abdominal exams  - consider reversing aspirin
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
## 2024-02-16 LAB
4HR DELTA HS TROPONIN: 0 NG/L
ALBUMIN SERPL BCP-MCNC: 3.8 G/DL (ref 3.5–5)
ALP SERPL-CCNC: 65 U/L (ref 34–104)
ALT SERPL W P-5'-P-CCNC: 14 U/L (ref 7–52)
ANION GAP SERPL CALCULATED.3IONS-SCNC: 7 MMOL/L
AST SERPL W P-5'-P-CCNC: 16 U/L (ref 13–39)
ATRIAL RATE: 71 BPM
BASOPHILS # BLD AUTO: 0.03 THOUSANDS/ÂΜL (ref 0–0.1)
BASOPHILS NFR BLD AUTO: 0 % (ref 0–1)
BILIRUB SERPL-MCNC: 0.97 MG/DL (ref 0.2–1)
BUN SERPL-MCNC: 17 MG/DL (ref 5–25)
CALCIUM SERPL-MCNC: 8.3 MG/DL (ref 8.4–10.2)
CARDIAC TROPONIN I PNL SERPL HS: 4 NG/L
CHLORIDE SERPL-SCNC: 106 MMOL/L (ref 96–108)
CO2 SERPL-SCNC: 27 MMOL/L (ref 21–32)
CREAT SERPL-MCNC: 0.61 MG/DL (ref 0.6–1.3)
EOSINOPHIL # BLD AUTO: 0.02 THOUSAND/ÂΜL (ref 0–0.61)
EOSINOPHIL NFR BLD AUTO: 0 % (ref 0–6)
ERYTHROCYTE [DISTWIDTH] IN BLOOD BY AUTOMATED COUNT: 13.4 % (ref 11.6–15.1)
GFR SERPL CREATININE-BSD FRML MDRD: 91 ML/MIN/1.73SQ M
GLUCOSE SERPL-MCNC: 121 MG/DL (ref 65–140)
HCT VFR BLD AUTO: 39.7 % (ref 34.8–46.1)
HCT VFR BLD AUTO: 40.5 % (ref 34.8–46.1)
HCT VFR BLD AUTO: 40.8 % (ref 34.8–46.1)
HGB BLD-MCNC: 12.8 G/DL (ref 11.5–15.4)
HGB BLD-MCNC: 12.9 G/DL (ref 11.5–15.4)
HGB BLD-MCNC: 13.2 G/DL (ref 11.5–15.4)
IMM GRANULOCYTES # BLD AUTO: 0.02 THOUSAND/UL (ref 0–0.2)
IMM GRANULOCYTES NFR BLD AUTO: 0 % (ref 0–2)
LYMPHOCYTES # BLD AUTO: 1.61 THOUSANDS/ÂΜL (ref 0.6–4.47)
LYMPHOCYTES NFR BLD AUTO: 19 % (ref 14–44)
MCH RBC QN AUTO: 29.3 PG (ref 26.8–34.3)
MCHC RBC AUTO-ENTMCNC: 31.6 G/DL (ref 31.4–37.4)
MCV RBC AUTO: 93 FL (ref 82–98)
MONOCYTES # BLD AUTO: 1.02 THOUSAND/ÂΜL (ref 0.17–1.22)
MONOCYTES NFR BLD AUTO: 12 % (ref 4–12)
NEUTROPHILS # BLD AUTO: 5.8 THOUSANDS/ÂΜL (ref 1.85–7.62)
NEUTS SEG NFR BLD AUTO: 69 % (ref 43–75)
NRBC BLD AUTO-RTO: 0 /100 WBCS
P AXIS: 75 DEGREES
PLATELET # BLD AUTO: 249 THOUSANDS/UL (ref 149–390)
PMV BLD AUTO: 10.1 FL (ref 8.9–12.7)
POTASSIUM SERPL-SCNC: 3.8 MMOL/L (ref 3.5–5.3)
PR INTERVAL: 132 MS
PROT SERPL-MCNC: 6.3 G/DL (ref 6.4–8.4)
QRS AXIS: 17 DEGREES
QRSD INTERVAL: 84 MS
QT INTERVAL: 392 MS
QTC INTERVAL: 425 MS
RBC # BLD AUTO: 4.4 MILLION/UL (ref 3.81–5.12)
SODIUM SERPL-SCNC: 140 MMOL/L (ref 135–147)
T WAVE AXIS: -11 DEGREES
VENTRICULAR RATE: 71 BPM
WBC # BLD AUTO: 8.5 THOUSAND/UL (ref 4.31–10.16)

## 2024-02-16 PROCEDURE — 84484 ASSAY OF TROPONIN QUANT: CPT

## 2024-02-16 PROCEDURE — 85018 HEMOGLOBIN: CPT

## 2024-02-16 PROCEDURE — 93010 ELECTROCARDIOGRAM REPORT: CPT | Performed by: INTERNAL MEDICINE

## 2024-02-16 PROCEDURE — 80053 COMPREHEN METABOLIC PANEL: CPT

## 2024-02-16 PROCEDURE — 85025 COMPLETE CBC W/AUTO DIFF WBC: CPT

## 2024-02-16 PROCEDURE — 85014 HEMATOCRIT: CPT

## 2024-02-16 PROCEDURE — 97163 PT EVAL HIGH COMPLEX 45 MIN: CPT

## 2024-02-16 PROCEDURE — 99232 SBSQ HOSP IP/OBS MODERATE 35: CPT | Performed by: PHYSICIAN ASSISTANT

## 2024-02-16 PROCEDURE — 97166 OT EVAL MOD COMPLEX 45 MIN: CPT

## 2024-02-16 RX ORDER — ENOXAPARIN SODIUM 100 MG/ML
30 INJECTION SUBCUTANEOUS EVERY 12 HOURS SCHEDULED
Status: DISCONTINUED | OUTPATIENT
Start: 2024-02-16 | End: 2024-02-17 | Stop reason: HOSPADM

## 2024-02-16 RX ADMIN — ENOXAPARIN SODIUM 30 MG: 30 INJECTION SUBCUTANEOUS at 21:26

## 2024-02-16 RX ADMIN — OXYCODONE HYDROCHLORIDE 5 MG: 5 TABLET ORAL at 21:28

## 2024-02-16 RX ADMIN — PANTOPRAZOLE SODIUM 40 MG: 40 TABLET, DELAYED RELEASE ORAL at 05:42

## 2024-02-16 RX ADMIN — ACETAMINOPHEN 650 MG: 325 TABLET, FILM COATED ORAL at 12:27

## 2024-02-16 RX ADMIN — NEBIVOLOL 5 MG: 5 TABLET ORAL at 08:25

## 2024-02-16 RX ADMIN — LEVOTHYROXINE SODIUM 100 MCG: 100 TABLET ORAL at 05:42

## 2024-02-16 NOTE — ASSESSMENT & PLAN NOTE
CTCHest: 1.3 x 0.6 cm irregular opacity in the right upper lobe.   - Incidental finding\  - outpatient f/u

## 2024-02-16 NOTE — PLAN OF CARE
Problem: PAIN - ADULT  Goal: Verbalizes/displays adequate comfort level or baseline comfort level  Description: Interventions:  - Encourage patient to monitor pain and request assistance  - Assess pain using appropriate pain scale  - Administer analgesics based on type and severity of pain and evaluate response  - Implement non-pharmacological measures as appropriate and evaluate response  - Consider cultural and social influences on pain and pain management  - Notify physician/advanced practitioner if interventions unsuccessful or patient reports new pain  Outcome: Progressing     Problem: INFECTION - ADULT  Goal: Absence or prevention of progression during hospitalization  Description: INTERVENTIONS:  - Assess and monitor for signs and symptoms of infection  - Monitor lab/diagnostic results  - Monitor all insertion sites, i.e. indwelling lines, tubes, and drains  - Monitor endotracheal if appropriate and nasal secretions for changes in amount and color  - [place] appropriate cooling/warming therapies per order  - Administer medications as ordered  - Instruct and encourage patient and family to use good hand hygiene technique  - Identify and instruct in appropriate isolation precautions for identified infection/condition  Outcome: Progressing     Problem: SAFETY ADULT  Goal: Patient will remain free of falls  Description: INTERVENTIONS:  - Educate patient/family on patient safety including physical limitations  - Instruct patient to call for assistance with activity   - Consult OT/PT to assist with strengthening/mobility   - Keep Call bell within reach  - Keep bed low and locked with side rails adjusted as appropriate  - Keep care items and personal belongings within reach  - Initiate and maintain comfort rounds  - Make Fall Risk Sign visible to staff  - Apply yellow socks and bracelet for high fall risk patients  - Consider moving patient to room near nurses station  Outcome: Progressing  Goal: Maintain or  return to baseline ADL function  Description: INTERVENTIONS:  -  Assess patient's ability to carry out ADLs; assess patient's baseline for ADL function and identify physical deficits which impact ability to perform ADLs (bathing, care of mouth/teeth, toileting, grooming, dressing, etc.)  - Assess/evaluate cause of self-care deficits   - Assess range of motion  - Assess patient's mobility; develop plan if impaired  - Assess patient's need for assistive devices and provide as appropriate  - Encourage maximum independence but intervene and supervise when necessary  - Involve family in performance of ADLs  - Assess for home care needs following discharge   - Consider OT consult to assist with ADL evaluation and planning for discharge  - Provide patient education as appropriate  Outcome: Progressing  Goal: Maintains/Returns to pre admission functional level  Description: INTERVENTIONS:  - Perform AM-PAC 6 Click Basic Mobility/ Daily Activity assessment daily.  - Set and communicate daily mobility goal to care team and patient/family/caregiver.   - Collaborate with rehabilitation services on mobility goals if consulted  - Record patient progress and toleration of activity level   Outcome: Progressing     Problem: DISCHARGE PLANNING  Goal: Discharge to home or other facility with appropriate resources  Description: INTERVENTIONS:  - Identify barriers to discharge w/patient and caregiver  - Arrange for needed discharge resources and transportation as appropriate  - Identify discharge learning needs (meds, wound care, etc.)  - Arrange for interpretive services to assist at discharge as needed  - Refer to Case Management Department for coordinating discharge planning if the patient needs post-hospital services based on physician/advanced practitioner order or complex needs related to functional status, cognitive ability, or social support system  Outcome: Progressing     Problem: Knowledge Deficit  Goal:  Patient/family/caregiver demonstrates understanding of disease process, treatment plan, medications, and discharge instructions  Description: Complete learning assessment and assess knowledge base.  Interventions:  - Provide teaching at level of understanding  - Provide teaching via preferred learning methods  Outcome: Progressing

## 2024-02-16 NOTE — ASSESSMENT & PLAN NOTE
- CT abdomen pelvis: Large left lower quadrant anterior abdominal wall contusion with more focal small organized hematoma centrally, within which there is a curvilinear blush of contrast, indicating active extravasation  - On asa at home for preventative measures - given DDAVP on admission    - Patient will stop taking aspirin and will discuss ongoing need with her PCP at her next physical.   - Abdomen binder in place  - Hemoglobin and hemodynamics remain stable  - Continue to monitor
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
## 2024-02-16 NOTE — ASSESSMENT & PLAN NOTE
CTCHest: 1.3 x 0.6 cm irregular opacity in the right upper lobe.   - Incidental finding  - outpatient f/u with non contrast CT chest in 3 months to ensure resolution
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
## 2024-02-16 NOTE — PLAN OF CARE
Problem: SAFETY ADULT  Goal: Maintains/Returns to pre admission functional level  Description: INTERVENTIONS:  - Perform AM-PAC 6 Click Basic Mobility/ Daily Activity assessment daily.  - Set and communicate daily mobility goal to care team and patient/family/caregiver.   - Collaborate with rehabilitation services on mobility goals if consulted    - Out of bed for toileting  - Record patient progress and toleration of activity level   Outcome: Progressing     Problem: PAIN - ADULT  Goal: Verbalizes/displays adequate comfort level or baseline comfort level  Description: Interventions:  - Encourage patient to monitor pain and request assistance  - Assess pain using appropriate pain scale  - Administer analgesics based on type and severity of pain and evaluate response  - Implement non-pharmacological measures as appropriate and evaluate response  - Consider cultural and social influences on pain and pain management  - Notify physician/advanced practitioner if interventions unsuccessful or patient reports new pain  Outcome: Progressing     Problem: Knowledge Deficit  Goal: Patient/family/caregiver demonstrates understanding of disease process, treatment plan, medications, and discharge instructions  Description: Complete learning assessment and assess knowledge base.  Interventions:  - Provide teaching at level of understanding  - Provide teaching via preferred learning methods  Outcome: Progressing     Problem: PAIN - ADULT  Goal: Verbalizes/displays adequate comfort level or baseline comfort level  Description: Interventions:  - Encourage patient to monitor pain and request assistance  - Assess pain using appropriate pain scale  - Administer analgesics based on type and severity of pain and evaluate response  - Implement non-pharmacological measures as appropriate and evaluate response  - Consider cultural and social influences on pain and pain management  - Notify physician/advanced practitioner if interventions  unsuccessful or patient reports new pain  Outcome: Progressing

## 2024-02-16 NOTE — ASSESSMENT & PLAN NOTE
- CT chest: Large hematoma/contusion in the subcutaneous fat of the right anterior chest wall with no acute displaced rib or costal cartilage fracture.   - Hgb and hemodynamics remain stable  - multimodal pain regimen

## 2024-02-17 VITALS
HEART RATE: 72 BPM | DIASTOLIC BLOOD PRESSURE: 91 MMHG | TEMPERATURE: 97.4 F | OXYGEN SATURATION: 94 % | SYSTOLIC BLOOD PRESSURE: 137 MMHG | RESPIRATION RATE: 19 BRPM

## 2024-02-17 LAB
ANION GAP SERPL CALCULATED.3IONS-SCNC: 8 MMOL/L
BUN SERPL-MCNC: 16 MG/DL (ref 5–25)
CALCIUM SERPL-MCNC: 7.7 MG/DL (ref 8.4–10.2)
CHLORIDE SERPL-SCNC: 102 MMOL/L (ref 96–108)
CO2 SERPL-SCNC: 27 MMOL/L (ref 21–32)
CREAT SERPL-MCNC: 0.54 MG/DL (ref 0.6–1.3)
ERYTHROCYTE [DISTWIDTH] IN BLOOD BY AUTOMATED COUNT: 13.2 % (ref 11.6–15.1)
GFR SERPL CREATININE-BSD FRML MDRD: 95 ML/MIN/1.73SQ M
GLUCOSE SERPL-MCNC: 104 MG/DL (ref 65–140)
HCT VFR BLD AUTO: 37.7 % (ref 34.8–46.1)
HGB BLD-MCNC: 12.1 G/DL (ref 11.5–15.4)
MCH RBC QN AUTO: 29.3 PG (ref 26.8–34.3)
MCHC RBC AUTO-ENTMCNC: 32.1 G/DL (ref 31.4–37.4)
MCV RBC AUTO: 91 FL (ref 82–98)
PLATELET # BLD AUTO: 227 THOUSANDS/UL (ref 149–390)
PMV BLD AUTO: 9.6 FL (ref 8.9–12.7)
POTASSIUM SERPL-SCNC: 3.5 MMOL/L (ref 3.5–5.3)
RBC # BLD AUTO: 4.13 MILLION/UL (ref 3.81–5.12)
SODIUM SERPL-SCNC: 137 MMOL/L (ref 135–147)
WBC # BLD AUTO: 7.78 THOUSAND/UL (ref 4.31–10.16)

## 2024-02-17 PROCEDURE — 85027 COMPLETE CBC AUTOMATED: CPT | Performed by: PHYSICIAN ASSISTANT

## 2024-02-17 PROCEDURE — 99238 HOSP IP/OBS DSCHRG MGMT 30/<: CPT | Performed by: SURGERY

## 2024-02-17 PROCEDURE — 80048 BASIC METABOLIC PNL TOTAL CA: CPT | Performed by: PHYSICIAN ASSISTANT

## 2024-02-17 RX ORDER — OXYCODONE HYDROCHLORIDE 5 MG/1
2.5 TABLET ORAL EVERY 6 HOURS PRN
Qty: 20 TABLET | Refills: 0 | Status: SHIPPED | OUTPATIENT
Start: 2024-02-17 | End: 2024-02-27

## 2024-02-17 RX ORDER — METHOCARBAMOL 500 MG/1
500 TABLET, FILM COATED ORAL 4 TIMES DAILY
Qty: 28 TABLET | Refills: 0 | Status: SHIPPED | OUTPATIENT
Start: 2024-02-17 | End: 2024-02-24

## 2024-02-17 RX ADMIN — LEVOTHYROXINE SODIUM 100 MCG: 100 TABLET ORAL at 05:17

## 2024-02-17 RX ADMIN — ACETAMINOPHEN 650 MG: 325 TABLET, FILM COATED ORAL at 10:46

## 2024-02-17 RX ADMIN — ENOXAPARIN SODIUM 30 MG: 30 INJECTION SUBCUTANEOUS at 09:17

## 2024-02-17 RX ADMIN — Medication 2.5 MG: at 05:20

## 2024-02-17 RX ADMIN — NEBIVOLOL 5 MG: 5 TABLET ORAL at 09:17

## 2024-02-17 RX ADMIN — PANTOPRAZOLE SODIUM 40 MG: 40 TABLET, DELAYED RELEASE ORAL at 05:16

## 2024-02-17 NOTE — PLAN OF CARE
Problem: PAIN - ADULT  Goal: Verbalizes/displays adequate comfort level or baseline comfort level  Description: Interventions:  - Encourage patient to monitor pain and request assistance  - Assess pain using appropriate pain scale  - Administer analgesics based on type and severity of pain and evaluate response  - Implement non-pharmacological measures as appropriate and evaluate response  - Consider cultural and social influences on pain and pain management  - Notify physician/advanced practitioner if interventions unsuccessful or patient reports new pain  Outcome: Progressing     Problem: INFECTION - ADULT  Goal: Absence or prevention of progression during hospitalization  Description: INTERVENTIONS:  - Assess and monitor for signs and symptoms of infection  - Monitor lab/diagnostic results  - Monitor all insertion sites, i.e. indwelling lines, tubes, and drains  - Monitor endotracheal if appropriate and nasal secretions for changes in amount and color  - Treichlers appropriate cooling/warming therapies per order  - Administer medications as ordered  - Instruct and encourage patient and family to use good hand hygiene technique  - Identify and instruct in appropriate isolation precautions for identified infection/condition  Outcome: Progressing     Problem: SAFETY ADULT  Goal: Patient will remain free of falls  Description: INTERVENTIONS:  - Educate patient/family on patient safety including physical limitations  - Instruct patient to call for assistance with activity   - Consult OT/PT to assist with strengthening/mobility   - Keep Call bell within reach  - Keep bed low and locked with side rails adjusted as appropriate  - Keep care items and personal belongings within reach  - Initiate and maintain comfort rounds  - Make Fall Risk Sign visible to staff  - Apply yellow socks and bracelet for high fall risk patients  - Consider moving patient to room near nurses station  Outcome: Progressing  Goal: Maintain or  return to baseline ADL function  Description: INTERVENTIONS:  -  Assess patient's ability to carry out ADLs; assess patient's baseline for ADL function and identify physical deficits which impact ability to perform ADLs (bathing, care of mouth/teeth, toileting, grooming, dressing, etc.)  - Assess/evaluate cause of self-care deficits   - Assess range of motion  - Assess patient's mobility; develop plan if impaired  - Assess patient's need for assistive devices and provide as appropriate  - Encourage maximum independence but intervene and supervise when necessary  - Involve family in performance of ADLs  - Assess for home care needs following discharge   - Consider OT consult to assist with ADL evaluation and planning for discharge  - Provide patient education as appropriate  Outcome: Progressing  Goal: Maintains/Returns to pre admission functional level  Description: INTERVENTIONS:  - Perform AM-PAC 6 Click Basic Mobility/ Daily Activity assessment daily.  - Set and communicate daily mobility goal to care team and patient/family/caregiver.   - Collaborate with rehabilitation services on mobility goals if consulted  - Record patient progress and toleration of activity level   Outcome: Progressing     Problem: DISCHARGE PLANNING  Goal: Discharge to home or other facility with appropriate resources  Description: INTERVENTIONS:  - Identify barriers to discharge w/patient and caregiver  - Arrange for needed discharge resources and transportation as appropriate  - Identify discharge learning needs (meds, wound care, etc.)  - Arrange for interpretive services to assist at discharge as needed  - Refer to Case Management Department for coordinating discharge planning if the patient needs post-hospital services based on physician/advanced practitioner order or complex needs related to functional status, cognitive ability, or social support system  Outcome: Progressing     Problem: Knowledge Deficit  Goal:  Patient/family/caregiver demonstrates understanding of disease process, treatment plan, medications, and discharge instructions  Description: Complete learning assessment and assess knowledge base.  Interventions:  - Provide teaching at level of understanding  - Provide teaching via preferred learning methods  Outcome: Progressing

## 2024-02-17 NOTE — INCIDENTAL FINDINGS
The following findings require follow up:  Radiographic finding   Finding: 1.3 x 0.6cm irregular opacity in the Right Upper lobe   Follow up required: Repeat CT scan of chest in 3 months   Follow up should be done within 3 month(s)    Please notify the following clinician to assist with the follow up:   Dr. Spencer Marquis, patient's PCP. Discussed incidental finding with patient at length of RUL nodule. She will require a repeat CT chest without contrast in 3 months to evaluate for resolution. Patient and daughter express verbal understanding

## 2024-02-17 NOTE — ASSESSMENT & PLAN NOTE
- CT abdomen pelvis: Large left lower quadrant anterior abdominal wall contusion with more focal small organized hematoma centrally, within which there is a curvilinear blush of contrast, indicating active extravasation  - On asa at home for preventative measures - given DDAVP on admission    - Patient will stop taking aspirin and will discuss ongoing need with her PCP at her next physical.   - Abdomen binder in place  - Hemoglobin and hemodynamics remain stable
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
## 2024-02-17 NOTE — DISCHARGE INSTRUCTIONS
Follow up with PCP to discuss pulmonary nodule. Will need a repeat CT scan without contrast in 3 months to ensure resolution

## 2024-02-20 ENCOUNTER — APPOINTMENT (OUTPATIENT)
Dept: RADIOLOGY | Facility: MEDICAL CENTER | Age: 74
End: 2024-02-20
Payer: MEDICARE

## 2024-02-20 ENCOUNTER — HOSPITAL ENCOUNTER (OUTPATIENT)
Dept: CT IMAGING | Facility: HOSPITAL | Age: 74
Discharge: HOME | End: 2024-02-20
Attending: ORTHOPAEDIC SURGERY
Payer: MEDICARE

## 2024-02-20 ENCOUNTER — OFFICE VISIT (OUTPATIENT)
Dept: OBGYN CLINIC | Facility: MEDICAL CENTER | Age: 74
End: 2024-02-20
Payer: COMMERCIAL

## 2024-02-20 VITALS
DIASTOLIC BLOOD PRESSURE: 85 MMHG | HEART RATE: 73 BPM | WEIGHT: 221 LBS | BODY MASS INDEX: 35.52 KG/M2 | HEIGHT: 66 IN | SYSTOLIC BLOOD PRESSURE: 148 MMHG
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
## 2024-02-20 DIAGNOSIS — G89.11 ACUTE PAIN DUE TO TRAUMA: ICD-10-CM

## 2024-02-20 DIAGNOSIS — S62.015A CLOSED NONDISPLACED FRACTURE OF DISTAL POLE OF SCAPHOID BONE OF LEFT WRIST, INITIAL ENCOUNTER: ICD-10-CM

## 2024-02-20 DIAGNOSIS — S63.649A: ICD-10-CM

## 2024-02-20 DIAGNOSIS — S62.022A CLOSED TRANSVERSE FRACTURE OF WAIST OF SCAPHOID OF LEFT WRIST, INITIAL ENCOUNTER: Primary | ICD-10-CM

## 2024-02-20 PROCEDURE — 73110 X-RAY EXAM OF WRIST: CPT

## 2024-02-20 PROCEDURE — 99204 OFFICE O/P NEW MOD 45 MIN: CPT | Performed by: ORTHOPAEDIC SURGERY

## 2024-02-20 PROCEDURE — 73200 CT UPPER EXTREMITY W/O DYE: CPT

## 2024-02-20 PROCEDURE — 73120 X-RAY EXAM OF HAND: CPT

## 2024-02-20 RX ORDER — AMMONIUM LACTATE 12 G/100G
1 CREAM TOPICAL DAILY
COMMUNITY
Start: 2023-05-23 | End: 2024-05-22

## 2024-02-20 RX ORDER — PROMETHAZINE HYDROCHLORIDE AND CODEINE PHOSPHATE 6.25; 1 MG/5ML; MG/5ML
SOLUTION ORAL
COMMUNITY

## 2024-02-20 RX ORDER — AMOXICILLIN 500 MG/1
CAPSULE ORAL
COMMUNITY
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
## 2024-02-21 ENCOUNTER — TELEPHONE (OUTPATIENT)
Dept: OBGYN CLINIC | Facility: MEDICAL CENTER | Age: 74
End: 2024-02-21

## 2024-02-21 ENCOUNTER — TELEPHONE (OUTPATIENT)
Age: 74
End: 2024-02-21
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
## 2024-02-21 NOTE — TELEPHONE ENCOUNTER
Called patient discussed her CT scan results.  We discussed that the scaphoid fracture does appear acute.  There is some cystic changes but these seem more related to prior arthritic changes at the CMC and STT joints.  Overall her fracture is well aligned but there is some dorsal comminution.  Significant humpback deformity but her entire proximal row is slightly extended likely related to her increased volar tilt of her radial articular surface.  We also discussed the multiple other nondisplaced fractures noted on CT scan.  Please see that report for further details.    At this point we discussed 2 main treatment options include nonoperative management in a cast until scaphoid healing which would likely to be 8 to 12 weeks.  The alternative option is surgical intervention for scaphoid ORIF which may provide some more reliable healing given the dorsal comminution but would still require a lengthy period of immobilization potentially 6 weeks or more.  We discussed the risks associate with surgery include but are not limited to infection, bleeding, wrist stiffness, posttraumatic arthritis, nonunion/malunion, hardware failure, complex regional pain syndrome, continued pain, anesthetic complications, damage to surrounding structures.  She understands these risks and will think about her options.  She will call back when she is made a decision.

## 2024-02-21 NOTE — TELEPHONE ENCOUNTER
Caller: Inés    Doctor: Joanne    Reason for call: Went for her CT scan last night and the results are in her my-chart    Call back#: [phone]

## 2024-04-08 ENCOUNTER — TELEPHONE (OUTPATIENT)
Dept: CARDIOLOGY CLINIC | Facility: CLINIC | Age: 74
End: 2024-04-08
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
## 2024-04-08 NOTE — TELEPHONE ENCOUNTER
We put her on for a nurse visit tomorrow. I will have her loop interrogated and have PA check placement.
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
## 2024-04-09 ENCOUNTER — CLINICAL SUPPORT (OUTPATIENT)
Dept: CARDIOLOGY CLINIC | Facility: CLINIC | Age: 74
End: 2024-04-09
Payer: MEDICARE

## 2024-04-09 DIAGNOSIS — I48.0 PAF (PAROXYSMAL ATRIAL FIBRILLATION) (HCC): Primary | ICD-10-CM

## 2024-04-09 PROCEDURE — 99211 OFF/OP EST MAY X REQ PHY/QHP: CPT

## 2024-04-09 RX ORDER — FLECAINIDE ACETATE 50 MG/1
50 TABLET ORAL 2 TIMES DAILY
Qty: 60 TABLET | Refills: 3 | Status: SHIPPED | OUTPATIENT
Start: 2024-04-09
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
## 2024-04-19 ENCOUNTER — TELEPHONE (OUTPATIENT)
Dept: CARDIOLOGY CLINIC | Facility: CLINIC | Age: 74
End: 2024-04-19

## 2024-04-19 NOTE — TELEPHONE ENCOUNTER
I spoke with patient about Flecainide instructions, 50mg bid.      Also set patient up for one week EKG.

## 2024-04-19 NOTE — TELEPHONE ENCOUNTER
----- Message from Madelin Briscoe sent at 4/19/2024 10:24 AM EDT -----  Regarding: Flecainide use  Just spoke to pt , she just received her script for Flecainide and needs someone to contact on the usage and if she should start it today. Thanks

## 2024-04-24 ENCOUNTER — TELEPHONE (OUTPATIENT)
Dept: CARDIOLOGY CLINIC | Facility: CLINIC | Age: 74
End: 2024-04-24

## 2024-04-24 NOTE — TELEPHONE ENCOUNTER
----- Message from Brook Rodrigues sent at 4/24/2024 10:17 AM EDT -----  I called the patient and she stated she is not going to start the flecainide until next week due to being on steroids now for her hand.  I did tell her to call the MA'S phone when she is ready to reg and they can put the call out to me for scheduling.  I want to try and make this as easy on her as possible.  Valentine if this is not acceptable, please let me know.  ----- Message -----  From: Valentine Muller PA-C  Sent: 4/22/2024  12:50 PM EDT  To: Lisbeth Schaffer; Cardiology Ep Clerical    Hi,    This patient spoke to the device clinic regarding her loop recorder today. When I spoke with her today, she was very upset and on the verge of tears because when she called the office earlier, she said she waited on hold for a long time and was unable to get an appointment.     Can someone call her and schedule a 1 week EKG visit (this is after starting flecainide) and a F/U visit with Dr. García in a few months?    Thanks,  Valentine

## 2024-05-06 ENCOUNTER — REMOTE DEVICE CLINIC VISIT (OUTPATIENT)
Dept: CARDIOLOGY CLINIC | Facility: CLINIC | Age: 74
End: 2024-05-06
Payer: MEDICARE

## 2024-05-06 DIAGNOSIS — Z95.818 PRESENCE OF OTHER CARDIAC IMPLANTS AND GRAFTS: Primary | ICD-10-CM

## 2024-05-06 PROCEDURE — 93298 REM INTERROG DEV EVAL SCRMS: CPT | Performed by: STUDENT IN AN ORGANIZED HEALTH CARE EDUCATION/TRAINING PROGRAM

## 2024-05-06 NOTE — PROGRESS NOTES
"MDT LOOP II/ACTIVE SYSTEM IS MRI CONDITIONAL   CARELINK TRANSMISSION: LOOP RECORDER. PRESENTING RHYTHM SB @ 54 BPM. BATTERY STATUS \"OK.\" NO PATIENT OR DEVICE ACTIVATED EPISODES. HOWEVER THERE IS AN EGRAM RVR. HX OF PAF. PT TAKES FLECAINIDE, BYSTOLIC AND METOPROLOL TART. NO AC MEDS DUE TO GI BLEEDING. NORMAL DEVICE FUNCTION. DL   "

## 2024-06-03 ENCOUNTER — TELEPHONE (OUTPATIENT)
Dept: CARDIOLOGY CLINIC | Facility: CLINIC | Age: 74
End: 2024-06-03

## 2024-06-03 NOTE — TELEPHONE ENCOUNTER
SAVI --    Pt called stating she was supposed to start Flecainide on 4/9/24, but she held off b/c she had a lot of things going on. She did start it this morning and I scheduled her for 1wk EKG pot Flecainide start on 6/10/24. Pt is scheduled for f/u w/ you on 7/10/24.

## 2024-06-03 NOTE — TELEPHONE ENCOUNTER
Called pt due to afib seen on loop recorder since 6/2/24 @ 18:06. Pt is not on any anticoagulation. Pt on metoprolol & bystolic. Pt stated she felt when she went into afib but feels to be back in rhythm after she started flecainide this morning. Pt will try to send transmission later today when she returns home from work. Pt stated she drank alcohol yesterday which usually triggers her afib (Pt stated she hasn't consumed alcohol for some time). Report in chart for review.

## 2024-06-10 ENCOUNTER — CLINICAL SUPPORT (OUTPATIENT)
Dept: CARDIOLOGY CLINIC | Facility: CLINIC | Age: 74
End: 2024-06-10
Payer: MEDICARE

## 2024-06-10 DIAGNOSIS — I48.0 PAF (PAROXYSMAL ATRIAL FIBRILLATION) (HCC): Primary | ICD-10-CM

## 2024-06-10 PROCEDURE — RECHECK

## 2024-06-10 PROCEDURE — 93000 ELECTROCARDIOGRAM COMPLETE: CPT

## 2024-06-10 NOTE — PROGRESS NOTES
"Pt presents to the office under the direction of Dr. García for a 1 week EKG post Flecainide start. She was initiated on flecainide 50 mg BID on 06/03/24. She denies any cardiac symptoms and states she feels \"better\" with the med.    BP - 124/88  Hr - 58    EKG was reviewed by Dr. García and appears normal. Pt will continue with current meds.  "

## 2024-06-28 ENCOUNTER — HOSPITAL ENCOUNTER (OUTPATIENT)
Dept: CT IMAGING | Facility: HOSPITAL | Age: 74
Discharge: HOME | End: 2024-06-28
Payer: MEDICARE

## 2024-06-28 DIAGNOSIS — R91.1 PULMONARY NODULE 1 CM OR GREATER IN DIAMETER: ICD-10-CM

## 2024-06-28 PROCEDURE — 71250 CT THORAX DX C-: CPT

## 2024-07-10 ENCOUNTER — OFFICE VISIT (OUTPATIENT)
Dept: CARDIOLOGY CLINIC | Facility: CLINIC | Age: 74
End: 2024-07-10
Payer: MEDICARE

## 2024-07-10 VITALS
WEIGHT: 219 LBS | HEART RATE: 74 BPM | DIASTOLIC BLOOD PRESSURE: 72 MMHG | SYSTOLIC BLOOD PRESSURE: 114 MMHG | BODY MASS INDEX: 35.2 KG/M2 | HEIGHT: 66 IN

## 2024-07-10 DIAGNOSIS — I10 BENIGN ESSENTIAL HTN: ICD-10-CM

## 2024-07-10 DIAGNOSIS — I48.0 PAF (PAROXYSMAL ATRIAL FIBRILLATION) (HCC): Primary | ICD-10-CM

## 2024-07-10 PROCEDURE — 93000 ELECTROCARDIOGRAM COMPLETE: CPT | Performed by: INTERNAL MEDICINE

## 2024-07-10 PROCEDURE — 99213 OFFICE O/P EST LOW 20 MIN: CPT | Performed by: INTERNAL MEDICINE
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
## 2024-07-11 ENCOUNTER — PREP FOR PROCEDURE (OUTPATIENT)
Dept: CARDIOLOGY CLINIC | Facility: CLINIC | Age: 74
End: 2024-07-11

## 2024-07-11 ENCOUNTER — TELEPHONE (OUTPATIENT)
Dept: CARDIOLOGY CLINIC | Facility: CLINIC | Age: 74
End: 2024-07-11

## 2024-07-11 DIAGNOSIS — I48.0 PAF (PAROXYSMAL ATRIAL FIBRILLATION) (HCC): Primary | ICD-10-CM

## 2024-07-11 NOTE — TELEPHONE ENCOUNTER
Patient scheduled for A ITZEL/PFA AFIB ABLATION  on 8/26/24 at [facility] with Dr. RAYA.      7/11/24  Mailed patient instructions.     Patient aware of all general instructions.    Medication holds:  NO MED HOLDS     Labs to be done BETWEEN 7/26/24 & 8/19/24  CMP / CBC (FASTING 8 HOURS)      Thank you,  Lina Davila

## 2024-07-11 NOTE — LETTER
2024               CARDIAC ABLATION INSTRUCTIONS     Inés Duran   : 1950  MRN: [id number]  [address] [facility] [address]    Procedure: ITZEL/AFIB ABLATION      Procedure Date: 2024    Location: [facility] access hospital  Address: [address] [place] [id number]        Labs to be done  BETWEEN 24 & 24 : CMP /  CBC (FASTING 8 HOURS)        Arrival Time: The Hospital will contact you the day prior to your procedure, usually between 4PM - 6PM to instruct you on the time and place to report. If you do not hear from a [facility]  by 6PM the evening prior to your procedure, please contact the campus you are scheduled at.     DO NOT drink or eat ANYTHING after midnight the night prior to your procedure. You may have a minimal amount of water with your AM medications.     Arrange for a responsible adult to drive you to and from the hospital.    You should continue to take your morning medications with a sip of water UNLESS ADVISED OTHERWISE.       DO NOT stop taking Plavix or Aspirin unless advised otherwise.     If you are currently taking Fish Oil, Krill oil and/or Vitamin E please DO NOT TAKE FOR A WEEK PRIOR TO PROCEDURE.     If you are diabetic, DO NOT take any of your diabetic pills the morning of your procedure. Oral diabetic medications may include Glucophage, Prandin, Glyburide, Micronase, Avandia, Glucovance, Precose, Glynase, and Starlix.     Bring a list of daily medications, vitamins, minerals, herbals and nutritional supplements you take. Please include dosages and the times you take them each day.     If you are packing an overnight bag, pack minimal clothing, you will be given hospital sleepwear.   DO NOT bring money, valuables or jewelry. The wedding band is ok.     If you use CPAP machine, bring it to the hospital.      Bring your Photo ID and Insurance cards with you.     Please notify us if you have been admitted to the  hospital within 30 days.      FAILURE TO FOLLOW ANY OF THESE INSTRUCTIONS COULD RESULT IN THE CANCELLATION OF YOUR PROCEDURE      Please call [phone] if you do not hear from the  by 6:00PM the night before your procedure.    All patients enter through ENTRANCE B.  Parking is available free of charge or park on Parking Deck B.        Thank you,   Lina Davila  Surgery Coordinator  [facility] Cardiology   [address] [place] [id number]  Teams: [phone]

## 2024-07-11 NOTE — TELEPHONE ENCOUNTER
----- Message from July García DO sent at 7/10/2024  4:49 PM EDT -----  Please schedule PFA afib with abbot mapping.  ITZEL.    No CT prior    Thanks     july

## 2024-08-04 DIAGNOSIS — I48.0 PAF (PAROXYSMAL ATRIAL FIBRILLATION) (HCC): ICD-10-CM

## 2024-08-05 ENCOUNTER — REMOTE DEVICE CLINIC VISIT (OUTPATIENT)
Dept: CARDIOLOGY CLINIC | Facility: CLINIC | Age: 74
End: 2024-08-05
Payer: MEDICARE

## 2024-08-05 DIAGNOSIS — I48.0 PAF (PAROXYSMAL ATRIAL FIBRILLATION) (HCC): Primary | ICD-10-CM

## 2024-08-05 PROCEDURE — 93298 REM INTERROG DEV EVAL SCRMS: CPT | Performed by: INTERNAL MEDICINE

## 2024-08-05 NOTE — PROGRESS NOTES
"MDT LOOP II/ACTIVE SYSTEM IS MRI CONDITIONAL   CARELINK TRANSMISSION: LOOP RECORDER. PRESENTING RHYTHM NSR @ 75 BPM. BATTERY STATUS \"OK.\" NO PATIENT OR DEVICE ACTIVATED EPISODES. HOWEVER THERE IS AN EGRAM SHOWING AF FOR 37:14 HOURS. NO ALERT RECEIVED. HX OF PAF.  HX OF PAF. PT TAKES FLECAINIDE, METOPROLOL TART, BYSTOLIC AND ASA 81. NO AC MEDS DUE TO GI BLEEDS. ABLATION SCHEDUELD FOR 8/26/24. NORMAL DEVICE FUNCTION. DL   "

## 2024-08-06 DIAGNOSIS — I48.0 PAF (PAROXYSMAL ATRIAL FIBRILLATION) (HCC): ICD-10-CM

## 2024-08-06 RX ORDER — FLECAINIDE ACETATE 50 MG/1
50 TABLET ORAL 2 TIMES DAILY
Qty: 60 TABLET | Refills: 3 | Status: SHIPPED | OUTPATIENT
Start: 2024-08-06 | End: 2024-08-06

## 2024-08-06 RX ORDER — FLECAINIDE ACETATE 50 MG/1
50 TABLET ORAL 2 TIMES DAILY
Qty: 180 TABLET | Refills: 1 | Status: SHIPPED | OUTPATIENT
Start: 2024-08-06

## 2024-08-07 LAB
ALBUMIN SERPL-MCNC: 4.1 G/DL (ref 3.5–5.7)
ALP SERPL-CCNC: 77 U/L (ref 35–120)
ALT SERPL-CCNC: 14 U/L
ANION GAP SERPL CALCULATED.3IONS-SCNC: 9 MMOL/L (ref 3–11)
AST SERPL-CCNC: 12 U/L
BASOPHILS # BLD AUTO: 0 THOU/CMM (ref 0–0.1)
BASOPHILS NFR BLD AUTO: 1 %
BILIRUB SERPL-MCNC: 0.7 MG/DL (ref 0.2–1)
BUN SERPL-MCNC: 13 MG/DL (ref 7–25)
CALCIUM SERPL-MCNC: 8.5 MG/DL (ref 8.5–10.1)
CHLORIDE SERPL-SCNC: 107 MMOL/L (ref 100–109)
CHOLEST SERPL-MCNC: 178 MG/DL
CHOLEST/HDLC SERPL: 3.4 {RATIO}
CO2 SERPL-SCNC: 26 MMOL/L (ref 21–31)
CREAT SERPL-MCNC: 0.61 MG/DL (ref 0.4–1.1)
CYTOLOGY CMNT CVX/VAG CYTO-IMP: ABNORMAL
DIFFERENTIAL METHOD BLD: ABNORMAL
EOSINOPHIL # BLD AUTO: 0.1 THOU/CMM (ref 0–0.5)
EOSINOPHIL NFR BLD AUTO: 2 %
ERYTHROCYTE [DISTWIDTH] IN BLOOD BY AUTOMATED COUNT: 13.3 % (ref 12–16)
GFR/BSA.PRED SERPLBLD CYS-BASED-ARV: 95 ML/MIN/{1.73_M2}
GLUCOSE SERPL-MCNC: 102 MG/DL (ref 65–99)
HCT VFR BLD AUTO: 42 % (ref 35–43)
HDLC SERPL-MCNC: 52 MG/DL (ref 23–92)
HGB BLD-MCNC: 14.2 G/DL (ref 11.5–14.5)
LDLC SERPL CALC-MCNC: 107 MG/DL
LYMPHOCYTES # BLD AUTO: 1.9 THOU/CMM (ref 1–3)
LYMPHOCYTES NFR BLD AUTO: 32 %
MCH RBC QN AUTO: 29.8 PG (ref 26–34)
MCHC RBC AUTO-ENTMCNC: 33.7 G/DL (ref 32–37)
MCV RBC AUTO: 89 FL (ref 80–100)
MONOCYTES # BLD AUTO: 0.5 THOU/CMM (ref 0.3–1)
MONOCYTES NFR BLD AUTO: 9 %
NEUTROPHILS # BLD AUTO: 3.4 THOU/CMM (ref 1.8–7.8)
NEUTROPHILS NFR BLD AUTO: 56 %
NONHDLC SERPL-MCNC: 126 MG/DL
PLATELET # BLD AUTO: 293 THOU/CMM (ref 140–350)
PMV BLD REES-ECKER: 8.2 FL (ref 7.5–11.3)
POTASSIUM SERPL-SCNC: 4.2 MMOL/L (ref 3.5–5.2)
PROT SERPL-MCNC: 6.4 G/DL (ref 6.3–8.3)
RBC # BLD AUTO: 4.74 MILL/CMM (ref 3.7–4.7)
SODIUM SERPL-SCNC: 142 MMOL/L (ref 135–145)
TRIGL SERPL-MCNC: 96 MG/DL
WBC # BLD AUTO: 6 THOU/CMM (ref 4–10)

## 2024-08-07 NOTE — PROGRESS NOTES
Cardiology Consultation     Inés Duran  [id number]  1950  [facility] CARDIOLOGY [place]  [address] [facility] [address]      1. PAF (paroxysmal atrial fibrillation) (HCC)  apixaban (Eliquis) 5 mg      2. Benign essential HTN        3. Obesity, morbid (HCC)              Discussion/Summary:  Paroxysmal atrial fibrillation   -initially related to hyperthyroidism, but now having recurrent intermittent palpitations   -CHADS2 Vasc score of 3 (hypertension, female, agex1)  -reports cutting out alcohol as this was a major trigger  -2 week ZIO patch 09/13/2022 triggered episodes correlate with rare PACs, several episodes of SVT with longest lasting 14 beats which may represent atrial fibrillation versus atrial tachycardia  -not on NOAC due to GI bleed on full dose aspirin, currently tolerating aspirin 81 mg daily  -Offered patient to try NOAC, however patient declined  - Following with EP Dr. García  - Underwent loop recorder implantation 10/18/2023  - Currently on nebivolol 5 mg daily  -Takes flecainide 100 mg as needed for recurrent A-fib with Lopressor 25 mg  - Has additional Lopressor 25 mg daily as needed for palpitations  - Scheduled for A-fib ablation in 8/26/2024  - Patient interested in Watchman procedure, will check with Dr. García before referring to Dr. Villagomez for evaluation  Hypertension  -Continue with nebivolol 5 mg daily  -BP upper normal today, appears well controlled on prior visits  - Will continue to monitor for now  Obesity  - BMI of 35  Post ablative hypothyroidism    Follow-up in 6 months.    History of Present Illness:  Inés Duran is a 73 y.o. year old female with a past medical history of paroxysmal atrial fibrillation, hypertension and post ablative hypothyroidism.    She reports feeling well today.  She is concerned about going for the A-fib ablation.  Had a long discussion with her about the risks and benefits of it and she is agreeable to proceed with the A-fib  ablation at the end of the month.  Also discussed Watchman procedure and she is interested in this as well.  Will reach out to Dr. García to get his opinion.  She still having intermittent episodes of symptomatic atrial fibrillation.  She has been using flecainide as an abortive medication with success.        Patient Active Problem List   Diagnosis    PAF (paroxysmal atrial fibrillation) (HCC)    Benign essential HTN    Postablative hypothyroidism    Fall from slipping on ice    Closed fracture of medial malleolus of left tibia    Bimalleolar ankle fracture, left, closed, initial encounter    Peroneal tendonitis of left lower extremity    Obesity, morbid (HCC)    Abdominal wall hematoma    Chest wall hematoma    Acute pain due to trauma    Opacity of lung on imaging study    Closed nondisplaced fracture of distal pole of scaphoid bone of left wrist, initial encounter     Past Medical History:   Diagnosis Date    Disease of thyroid gland     Hypertension      Social History     Socioeconomic History    Marital status: /Civil Union     Spouse name: Not on file    Number of children: Not on file    Years of education: Not on file    Highest education level: Not on file   Occupational History    Not on file   Tobacco Use    Smoking status: Never     Passive exposure: Yes    Smokeless tobacco: Never   Vaping Use    Vaping status: Never Used   Substance and Sexual Activity    Alcohol use: No    Drug use: No    Sexual activity: Not Currently   Other Topics Concern    Not on file   Social History Narrative    Not on file     Social Determinants of Health     Financial Resource Strain: Not on file   Food Insecurity: No Food Insecurity (2/16/2024)    Hunger Vital Sign     Worried About Running Out of Food in the Last Year: Never true     Ran Out of Food in the Last Year: Never true   Transportation Needs: No Transportation Needs (2/16/2024)    PRAPARE - Transportation     Lack of Transportation (Medical): No     Lack  of Transportation (Non-Medical): No   Physical Activity: Not on file   Stress: Not on file   Social Connections: Not on file   Intimate Partner Violence: Not on file   Housing Stability: Unknown (2/16/2024)    Housing Stability Vital Sign     Unable to Pay for Housing in the Last Year: Patient declined     Number of Times Moved in the Last Year: 1     Homeless in the Last Year: No      Family History   Problem Relation Age of Onset    No Known Problems Mother     Lung cancer Father 67    No Known Problems Sister     No Known Problems Daughter     No Known Problems Maternal Grandmother     No Known Problems Maternal Grandfather     No Known Problems Paternal Grandmother     No Known Problems Paternal Grandfather     No Known Problems Maternal Aunt     No Known Problems Maternal Aunt     No Known Problems Maternal Aunt     No Known Problems Maternal Aunt     No Known Problems Paternal Aunt     No Known Problems Paternal Aunt     No Known Problems Paternal Aunt     Coronary artery disease Family     Colon cancer Cousin 80    Lung cancer Cousin 80     Past Surgical History:   Procedure Laterality Date    BREAST BIOPSY Right 1996    core bx  benign    BREAST LUMPECTOMY Right 1990    benign    BUNIONECTOMY Left     CARDIAC ELECTROPHYSIOLOGY PROCEDURE N/A 10/18/2023    Procedure: Cardiac loop recorder implant;  Surgeon: Leonel Olivas MD;  [facility];  Service: Cardiology    HAND SURGERY Right     ganglion cyst removal    ORIF ANKLE FRACTURE Right 2005    ORIF TIBIA & FIBULA FRACTURES Left 2/13/2019    Procedure: OPEN REDUCTION W/ INTERNAL FIXATION (ORIF) ANKLE;  Surgeon: Marnie Silvestre DO;  Location: [facility] OR;  Service: Orthopedics    THYROIDECTOMY      TONSILLECTOMY AND ADENOIDECTOMY         Current Outpatient Medications:     apixaban (Eliquis) 5 mg, Take 1 tablet (5 mg total) by mouth 2 (two) times a day, Disp: 60 tablet, Rfl: 3    Cholecalciferol (Vitamin D) 50 MCG (2000 UT) tablet, Take 2,000  Units by mouth daily, Disp: , Rfl:     flecainide (TAMBOCOR) 50 mg tablet, TAKE 1 TABLET(50 MG) BY MOUTH TWICE DAILY (Patient taking differently: Take 50 mg by mouth as needed (atiral fibrillation)), Disp: 180 tablet, Rfl: 1    levothyroxine 100 mcg tablet, Take 100 mcg by mouth daily, Disp: , Rfl: 2    metoprolol tartrate (LOPRESSOR) 25 mg tablet, Take 25 mg by mouth as needed Take two tablets as needed for A fib episodes, Disp: , Rfl:     Multiple Vitamins-Minerals (multivitamin with minerals) tablet, Take 1 tablet by mouth daily, Disp: , Rfl:     nebivolol (BYSTOLIC) 5 mg tablet, TAKE 1 TABLET BY MOUTH DAILY, Disp: 90 tablet, Rfl: 3    omeprazole (PriLOSEC) 20 mg delayed release capsule, TAKE 1 CAPSULE(20 MG) BY MOUTH DAILY, Disp: 90 capsule, Rfl: 3    ammonium lactate (LAC-HYDRIN) 12 % cream, Apply 1 application. topically as needed for dry skin (feet) (Patient not taking: Reported on 2024), Disp: , Rfl:     Current Facility-Administered Medications:     ondansetron (ZOFRAN-ODT) dispersible tablet 4 mg, 4 mg, Oral, Q6H PRN, Johnny Glass PA-C  No Known Allergies      Labs:  Lab Results   Component Value Date    ALT 14 2024    ALT 14 2024    AST 12 2024    AST 12 2024    BUN 13 2024    BUN 13 2024    CALCIUM 8.5 2024    CALCIUM 8.5 2024     2024     2024    CO2 26 2024    CO2 26 2024    CREATININE 0.61 2024    CREATININE 0.61 2024    HDL 52 2024    HCT 42.0 2024    HGB 14.2 2024    MG 2.0 2015     2024    K 4.2 2024    K 4.2 2024     2015    TRIG 96 2024    WBC 6.0 2024       Imaging: No results found.    EC2022 normal sinus rhythm, nonspecific T-wave changes    Review of Systems:  Review of Systems   Constitutional:  Negative for chills, diaphoresis, fatigue and fever.   HENT:  Negative for congestion.    Eyes:  Negative for  "photophobia and visual disturbance.   Respiratory:  Negative for chest tightness and shortness of breath.    Cardiovascular:  Positive for palpitations. Negative for chest pain and leg swelling.   Gastrointestinal:  Negative for abdominal distention, abdominal pain, diarrhea, nausea and vomiting.   Genitourinary:  Negative for difficulty urinating and dysuria.   Musculoskeletal:  Negative for arthralgias, gait problem and joint swelling.   Skin:  Negative for color change, pallor and rash.   Neurological:  Negative for dizziness, syncope and numbness.   Psychiatric/Behavioral:  Negative for agitation, behavioral problems and confusion.          Vitals:    08/08/24 1525   BP: 126/70   Pulse: 68   SpO2: 100%        Vitals:    08/08/24 1525   Weight: 98.3 kg (216 lb 12.8 oz)       Height: 5' 6\" (167.6 cm)     Physical Exam:  General appearance:  Appears stated age, alert, well appearing and in no distress  HEENT:  PERRLA, EOMI, no scleral icterus, no conjunctival pallor  NECK:  Supple, No elevated JVP, no thyromegaly, no carotid bruits  HEART:  Regular rate and rhythm, normal S1/S2, no S3/S4, no murmur or rub  LUNGS:  Clear to auscultation bilaterally  ABDOMEN:  Soft, non-tender, positive bowel sounds, no rebound or guarding, no organomegaly   EXTREMITIES:  No edema  VASCULAR:  Normal pedal pulses   SKIN: No lesions or rashes on exposed skin  NEURO:  CN II-XII intact, no focal deficits  "

## 2024-08-07 NOTE — H&P (VIEW-ONLY)
Cardiology Consultation     Inés Duran  [id number]  1950  [facility] CARDIOLOGY [place]  [address] [place] [address]      1. PAF (paroxysmal atrial fibrillation) (HCC)  apixaban (Eliquis) 5 mg      2. Benign essential HTN        3. Obesity, morbid (HCC)              Discussion/Summary:  Paroxysmal atrial fibrillation   -initially related to hyperthyroidism, but now having recurrent intermittent palpitations   -CHADS2 Vasc score of 3 (hypertension, female, agex1)  -reports cutting out alcohol as this was a major trigger  -2 week ZIO patch 09/13/2022 triggered episodes correlate with rare PACs, several episodes of SVT with longest lasting 14 beats which may represent atrial fibrillation versus atrial tachycardia  -not on NOAC due to GI bleed on full dose aspirin, currently tolerating aspirin 81 mg daily  -Offered patient to try NOAC, however patient declined  - Following with EP Dr. García  - Underwent loop recorder implantation 10/18/2023  - Currently on nebivolol 5 mg daily  -Takes flecainide 100 mg as needed for recurrent A-fib with Lopressor 25 mg  - Has additional Lopressor 25 mg daily as needed for palpitations  - Scheduled for A-fib ablation in 8/26/2024  - Patient interested in Watchman procedure, will check with Dr. García before referring to Dr. Villagomez for evaluation  Hypertension  -Continue with nebivolol 5 mg daily  -BP upper normal today, appears well controlled on prior visits  - Will continue to monitor for now  Obesity  - BMI of 35  Post ablative hypothyroidism    Follow-up in 6 months.    History of Present Illness:  Inés Duran is a 73 y.o. year old female with a past medical history of paroxysmal atrial fibrillation, hypertension and post ablative hypothyroidism.    She reports feeling well today.  She is concerned about going for the A-fib ablation.  Had a long discussion with her about the risks and benefits of it and she is agreeable to proceed with the A-fib  ablation at the end of the month.  Also discussed Watchman procedure and she is interested in this as well.  Will reach out to Dr. García to get his opinion.  She still having intermittent episodes of symptomatic atrial fibrillation.  She has been using flecainide as an abortive medication with success.        Patient Active Problem List   Diagnosis    PAF (paroxysmal atrial fibrillation) (HCC)    Benign essential HTN    Postablative hypothyroidism    Fall from slipping on ice    Closed fracture of medial malleolus of left tibia    Bimalleolar ankle fracture, left, closed, initial encounter    Peroneal tendonitis of left lower extremity    Obesity, morbid (HCC)    Abdominal wall hematoma    Chest wall hematoma    Acute pain due to trauma    Opacity of lung on imaging study    Closed nondisplaced fracture of distal pole of scaphoid bone of left wrist, initial encounter     Past Medical History:   Diagnosis Date    Disease of thyroid gland     Hypertension      Social History     Socioeconomic History    Marital status: /Civil Union     Spouse name: Not on file    Number of children: Not on file    Years of education: Not on file    Highest education level: Not on file   Occupational History    Not on file   Tobacco Use    Smoking status: Never     Passive exposure: Yes    Smokeless tobacco: Never   Vaping Use    Vaping status: Never Used   Substance and Sexual Activity    Alcohol use: No    Drug use: No    Sexual activity: Not Currently   Other Topics Concern    Not on file   Social History Narrative    Not on file     Social Determinants of Health     Financial Resource Strain: Not on file   Food Insecurity: No Food Insecurity (2/16/2024)    Hunger Vital Sign     Worried About Running Out of Food in the Last Year: Never true     Ran Out of Food in the Last Year: Never true   Transportation Needs: No Transportation Needs (2/16/2024)    PRAPARE - Transportation     Lack of Transportation (Medical): No     Lack  of Transportation (Non-Medical): No   Physical Activity: Not on file   Stress: Not on file   Social Connections: Not on file   Intimate Partner Violence: Not on file   Housing Stability: Unknown (2/16/2024)    Housing Stability Vital Sign     Unable to Pay for Housing in the Last Year: Patient declined     Number of Times Moved in the Last Year: 1     Homeless in the Last Year: No      Family History   Problem Relation Age of Onset    No Known Problems Mother     Lung cancer Father 67    No Known Problems Sister     No Known Problems Daughter     No Known Problems Maternal Grandmother     No Known Problems Maternal Grandfather     No Known Problems Paternal Grandmother     No Known Problems Paternal Grandfather     No Known Problems Maternal Aunt     No Known Problems Maternal Aunt     No Known Problems Maternal Aunt     No Known Problems Maternal Aunt     No Known Problems Paternal Aunt     No Known Problems Paternal Aunt     No Known Problems Paternal Aunt     Coronary artery disease Family     Colon cancer Cousin 80    Lung cancer Cousin 80     Past Surgical History:   Procedure Laterality Date    BREAST BIOPSY Right 1996    core bx  benign    BREAST LUMPECTOMY Right 1990    benign    BUNIONECTOMY Left     CARDIAC ELECTROPHYSIOLOGY PROCEDURE N/A 10/18/2023    Procedure: Cardiac loop recorder implant;  Surgeon: Leonel Olivas MD;  [facility];  Service: Cardiology    HAND SURGERY Right     ganglion cyst removal    ORIF ANKLE FRACTURE Right 2005    ORIF TIBIA & FIBULA FRACTURES Left 2/13/2019    Procedure: OPEN REDUCTION W/ INTERNAL FIXATION (ORIF) ANKLE;  Surgeon: Marnie Silvestre DO;  Location: [facility] OR;  Service: Orthopedics    THYROIDECTOMY      TONSILLECTOMY AND ADENOIDECTOMY         Current Outpatient Medications:     apixaban (Eliquis) 5 mg, Take 1 tablet (5 mg total) by mouth 2 (two) times a day, Disp: 60 tablet, Rfl: 3    Cholecalciferol (Vitamin D) 50 MCG (2000 UT) tablet, Take 2,000  Units by mouth daily, Disp: , Rfl:     flecainide (TAMBOCOR) 50 mg tablet, TAKE 1 TABLET(50 MG) BY MOUTH TWICE DAILY (Patient taking differently: Take 50 mg by mouth as needed (atiral fibrillation)), Disp: 180 tablet, Rfl: 1    levothyroxine 100 mcg tablet, Take 100 mcg by mouth daily, Disp: , Rfl: 2    metoprolol tartrate (LOPRESSOR) 25 mg tablet, Take 25 mg by mouth as needed Take two tablets as needed for A fib episodes, Disp: , Rfl:     Multiple Vitamins-Minerals (multivitamin with minerals) tablet, Take 1 tablet by mouth daily, Disp: , Rfl:     nebivolol (BYSTOLIC) 5 mg tablet, TAKE 1 TABLET BY MOUTH DAILY, Disp: 90 tablet, Rfl: 3    omeprazole (PriLOSEC) 20 mg delayed release capsule, TAKE 1 CAPSULE(20 MG) BY MOUTH DAILY, Disp: 90 capsule, Rfl: 3    ammonium lactate (LAC-HYDRIN) 12 % cream, Apply 1 application. topically as needed for dry skin (feet) (Patient not taking: Reported on 2024), Disp: , Rfl:     Current Facility-Administered Medications:     ondansetron (ZOFRAN-ODT) dispersible tablet 4 mg, 4 mg, Oral, Q6H PRN, Johnny Glass PA-C  No Known Allergies      Labs:  Lab Results   Component Value Date    ALT 14 2024    ALT 14 2024    AST 12 2024    AST 12 2024    BUN 13 2024    BUN 13 2024    CALCIUM 8.5 2024    CALCIUM 8.5 2024     2024     2024    CO2 26 2024    CO2 26 2024    CREATININE 0.61 2024    CREATININE 0.61 2024    HDL 52 2024    HCT 42.0 2024    HGB 14.2 2024    MG 2.0 2015     2024    K 4.2 2024    K 4.2 2024     2015    TRIG 96 2024    WBC 6.0 2024       Imaging: No results found.    EC2022 normal sinus rhythm, nonspecific T-wave changes    Review of Systems:  Review of Systems   Constitutional:  Negative for chills, diaphoresis, fatigue and fever.   HENT:  Negative for congestion.    Eyes:  Negative for  "photophobia and visual disturbance.   Respiratory:  Negative for chest tightness and shortness of breath.    Cardiovascular:  Positive for palpitations. Negative for chest pain and leg swelling.   Gastrointestinal:  Negative for abdominal distention, abdominal pain, diarrhea, nausea and vomiting.   Genitourinary:  Negative for difficulty urinating and dysuria.   Musculoskeletal:  Negative for arthralgias, gait problem and joint swelling.   Skin:  Negative for color change, pallor and rash.   Neurological:  Negative for dizziness, syncope and numbness.   Psychiatric/Behavioral:  Negative for agitation, behavioral problems and confusion.          Vitals:    08/08/24 1525   BP: 126/70   Pulse: 68   SpO2: 100%        Vitals:    08/08/24 1525   Weight: 98.3 kg (216 lb 12.8 oz)       Height: 5' 6\" (167.6 cm)     Physical Exam:  General appearance:  Appears stated age, alert, well appearing and in no distress  HEENT:  PERRLA, EOMI, no scleral icterus, no conjunctival pallor  NECK:  Supple, No elevated JVP, no thyromegaly, no carotid bruits  HEART:  Regular rate and rhythm, normal S1/S2, no S3/S4, no murmur or rub  LUNGS:  Clear to auscultation bilaterally  ABDOMEN:  Soft, non-tender, positive bowel sounds, no rebound or guarding, no organomegaly   EXTREMITIES:  No edema  VASCULAR:  Normal pedal pulses   SKIN: No lesions or rashes on exposed skin  NEURO:  CN II-XII intact, no focal deficits  "

## 2024-08-08 ENCOUNTER — OFFICE VISIT (OUTPATIENT)
Dept: CARDIOLOGY CLINIC | Facility: CLINIC | Age: 74
End: 2024-08-08
Payer: MEDICARE

## 2024-08-08 VITALS
OXYGEN SATURATION: 100 % | HEART RATE: 68 BPM | BODY MASS INDEX: 34.84 KG/M2 | SYSTOLIC BLOOD PRESSURE: 126 MMHG | HEIGHT: 66 IN | WEIGHT: 216.8 LBS | DIASTOLIC BLOOD PRESSURE: 70 MMHG

## 2024-08-08 DIAGNOSIS — I48.0 PAF (PAROXYSMAL ATRIAL FIBRILLATION) (HCC): Primary | ICD-10-CM

## 2024-08-08 DIAGNOSIS — I10 BENIGN ESSENTIAL HTN: ICD-10-CM

## 2024-08-08 DIAGNOSIS — E66.01 OBESITY, MORBID (HCC): ICD-10-CM

## 2024-08-08 PROCEDURE — 99214 OFFICE O/P EST MOD 30 MIN: CPT | Performed by: STUDENT IN AN ORGANIZED HEALTH CARE EDUCATION/TRAINING PROGRAM

## 2024-08-12 DIAGNOSIS — I48.0 PAF (PAROXYSMAL ATRIAL FIBRILLATION) (HCC): Primary | ICD-10-CM

## 2024-08-25 ENCOUNTER — ANESTHESIA EVENT (OUTPATIENT)
Dept: NON INVASIVE DIAGNOSTICS | Facility: HOSPITAL | Age: 74
End: 2024-08-25
Payer: MEDICARE

## 2024-08-26 ENCOUNTER — ANESTHESIA (OUTPATIENT)
Dept: NON INVASIVE DIAGNOSTICS | Facility: HOSPITAL | Age: 74
End: 2024-08-26
Payer: MEDICARE

## 2024-08-26 ENCOUNTER — TELEPHONE (OUTPATIENT)
Age: 74
End: 2024-08-26

## 2024-08-26 ENCOUNTER — HOSPITAL ENCOUNTER (OUTPATIENT)
Facility: HOSPITAL | Age: 74
Setting detail: OUTPATIENT SURGERY
Discharge: HOME | End: 2024-08-26
Attending: INTERNAL MEDICINE | Admitting: INTERNAL MEDICINE
Payer: MEDICARE

## 2024-08-26 VITALS
OXYGEN SATURATION: 92 % | SYSTOLIC BLOOD PRESSURE: 137 MMHG | HEIGHT: 66 IN | BODY MASS INDEX: 34.87 KG/M2 | DIASTOLIC BLOOD PRESSURE: 66 MMHG | RESPIRATION RATE: 18 BRPM | TEMPERATURE: 97.3 F | HEART RATE: 62 BPM | WEIGHT: 217 LBS

## 2024-08-26 DIAGNOSIS — I48.0 PAF (PAROXYSMAL ATRIAL FIBRILLATION) (HCC): ICD-10-CM

## 2024-08-26 LAB
ANION GAP SERPL CALCULATED.3IONS-SCNC: 10 MMOL/L (ref 4–13)
ATRIAL RATE: 68 BPM
ATRIAL RATE: 70 BPM
BASOPHILS # BLD AUTO: 0.05 THOUSANDS/ÂΜL (ref 0–0.1)
BASOPHILS NFR BLD AUTO: 1 % (ref 0–1)
BUN SERPL-MCNC: 15 MG/DL (ref 5–25)
CALCIUM SERPL-MCNC: 8.5 MG/DL (ref 8.4–10.2)
CHLORIDE SERPL-SCNC: 107 MMOL/L (ref 96–108)
CO2 SERPL-SCNC: 24 MMOL/L (ref 21–32)
CREAT SERPL-MCNC: 0.65 MG/DL (ref 0.6–1.3)
EOSINOPHIL # BLD AUTO: 0.05 THOUSAND/ÂΜL (ref 0–0.61)
EOSINOPHIL NFR BLD AUTO: 1 % (ref 0–6)
ERYTHROCYTE [DISTWIDTH] IN BLOOD BY AUTOMATED COUNT: 13.2 % (ref 11.6–15.1)
GFR SERPL CREATININE-BSD FRML MDRD: 89 ML/MIN/1.73SQ M
GLUCOSE P FAST SERPL-MCNC: 109 MG/DL (ref 65–99)
GLUCOSE SERPL-MCNC: 109 MG/DL (ref 65–140)
HCT VFR BLD AUTO: 42.9 % (ref 34.8–46.1)
HGB BLD-MCNC: 14 G/DL (ref 11.5–15.4)
IMM GRANULOCYTES # BLD AUTO: 0.02 THOUSAND/UL (ref 0–0.2)
IMM GRANULOCYTES NFR BLD AUTO: 0 % (ref 0–2)
INR PPP: 1.02 (ref 0.85–1.19)
KCT BLD-ACNC: 347 SEC (ref 89–137)
KCT BLD-ACNC: 395 SEC (ref 89–137)
LYMPHOCYTES # BLD AUTO: 1.81 THOUSANDS/ÂΜL (ref 0.6–4.47)
LYMPHOCYTES NFR BLD AUTO: 29 % (ref 14–44)
MCH RBC QN AUTO: 29.2 PG (ref 26.8–34.3)
MCHC RBC AUTO-ENTMCNC: 32.6 G/DL (ref 31.4–37.4)
MCV RBC AUTO: 89 FL (ref 82–98)
MONOCYTES # BLD AUTO: 0.56 THOUSAND/ÂΜL (ref 0.17–1.22)
MONOCYTES NFR BLD AUTO: 9 % (ref 4–12)
NEUTROPHILS # BLD AUTO: 3.67 THOUSANDS/ÂΜL (ref 1.85–7.62)
NEUTS SEG NFR BLD AUTO: 60 % (ref 43–75)
NRBC BLD AUTO-RTO: 0 /100 WBCS
P AXIS: -12 DEGREES
P AXIS: 65 DEGREES
PLATELET # BLD AUTO: 239 THOUSANDS/UL (ref 149–390)
PMV BLD AUTO: 9.9 FL (ref 8.9–12.7)
POTASSIUM SERPL-SCNC: 3.7 MMOL/L (ref 3.5–5.3)
PR INTERVAL: 138 MS
PR INTERVAL: 148 MS
PROTHROMBIN TIME: 13.7 SECONDS (ref 12.3–15)
QRS AXIS: 5 DEGREES
QRS AXIS: 67 DEGREES
QRSD INTERVAL: 82 MS
QRSD INTERVAL: 84 MS
QT INTERVAL: 402 MS
QT INTERVAL: 404 MS
QTC INTERVAL: 427 MS
QTC INTERVAL: 436 MS
RBC # BLD AUTO: 4.8 MILLION/UL (ref 3.81–5.12)
SODIUM SERPL-SCNC: 141 MMOL/L (ref 135–147)
SPECIMEN SOURCE: ABNORMAL
SPECIMEN SOURCE: ABNORMAL
T WAVE AXIS: -6 DEGREES
T WAVE AXIS: 22 DEGREES
VENTRICULAR RATE: 68 BPM
VENTRICULAR RATE: 70 BPM
WBC # BLD AUTO: 6.16 THOUSAND/UL (ref 4.31–10.16)

## 2024-08-26 PROCEDURE — 85347 COAGULATION TIME ACTIVATED: CPT

## 2024-08-26 PROCEDURE — C1769 GUIDE WIRE: HCPCS | Performed by: INTERNAL MEDICINE

## 2024-08-26 PROCEDURE — 80048 BASIC METABOLIC PNL TOTAL CA: CPT | Performed by: PHYSICIAN ASSISTANT

## 2024-08-26 PROCEDURE — C1733 CATH, EP, OTHR THAN COOL-TIP: HCPCS | Performed by: INTERNAL MEDICINE

## 2024-08-26 PROCEDURE — C1766 INTRO/SHEATH,STRBLE,NON-PEEL: HCPCS | Performed by: INTERNAL MEDICINE

## 2024-08-26 PROCEDURE — 93656 COMPRE EP EVAL ABLTJ ATR FIB: CPT | Performed by: INTERNAL MEDICINE

## 2024-08-26 PROCEDURE — C1894 INTRO/SHEATH, NON-LASER: HCPCS | Performed by: INTERNAL MEDICINE

## 2024-08-26 PROCEDURE — 93010 ELECTROCARDIOGRAM REPORT: CPT | Performed by: INTERNAL MEDICINE

## 2024-08-26 PROCEDURE — 93005 ELECTROCARDIOGRAM TRACING: CPT

## 2024-08-26 PROCEDURE — C1893 INTRO/SHEATH, FIXED,NON-PEEL: HCPCS | Performed by: INTERNAL MEDICINE

## 2024-08-26 PROCEDURE — C1759 CATH, INTRA ECHOCARDIOGRAPHY: HCPCS | Performed by: INTERNAL MEDICINE

## 2024-08-26 PROCEDURE — 85025 COMPLETE CBC W/AUTO DIFF WBC: CPT | Performed by: PHYSICIAN ASSISTANT

## 2024-08-26 PROCEDURE — 76937 US GUIDE VASCULAR ACCESS: CPT | Performed by: INTERNAL MEDICINE

## 2024-08-26 PROCEDURE — C1731 CATH, EP, 20 OR MORE ELEC: HCPCS | Performed by: INTERNAL MEDICINE

## 2024-08-26 PROCEDURE — C1760 CLOSURE DEV, VASC: HCPCS | Performed by: INTERNAL MEDICINE

## 2024-08-26 PROCEDURE — C1730 CATH, EP, 19 OR FEW ELECT: HCPCS | Performed by: INTERNAL MEDICINE

## 2024-08-26 PROCEDURE — 85610 PROTHROMBIN TIME: CPT | Performed by: PHYSICIAN ASSISTANT

## 2024-08-26 DEVICE — PERCLOSE™ PROSTYLE™ SUTURE-MEDIATED CLOSURE AND REPAIR SYSTEM
Type: IMPLANTABLE DEVICE | Site: GROIN | Status: FUNCTIONAL
Brand: PERCLOSE™ PROSTYLE™

## 2024-08-26 RX ORDER — FENTANYL CITRATE 50 UG/ML
INJECTION, SOLUTION INTRAMUSCULAR; INTRAVENOUS AS NEEDED
Status: DISCONTINUED | OUTPATIENT
Start: 2024-08-26 | End: 2024-08-26

## 2024-08-26 RX ORDER — PROTAMINE SULFATE 10 MG/ML
INJECTION, SOLUTION INTRAVENOUS AS NEEDED
Status: DISCONTINUED | OUTPATIENT
Start: 2024-08-26 | End: 2024-08-26

## 2024-08-26 RX ORDER — FENTANYL CITRATE/PF 50 MCG/ML
25 SYRINGE (ML) INJECTION
Status: DISCONTINUED | OUTPATIENT
Start: 2024-08-26 | End: 2024-08-26 | Stop reason: HOSPADM

## 2024-08-26 RX ORDER — PROPOFOL 10 MG/ML
INJECTION, EMULSION INTRAVENOUS AS NEEDED
Status: DISCONTINUED | OUTPATIENT
Start: 2024-08-26 | End: 2024-08-26

## 2024-08-26 RX ORDER — ONDANSETRON 2 MG/ML
4 INJECTION INTRAMUSCULAR; INTRAVENOUS ONCE AS NEEDED
Status: DISCONTINUED | OUTPATIENT
Start: 2024-08-26 | End: 2024-08-26 | Stop reason: HOSPADM

## 2024-08-26 RX ORDER — SODIUM CHLORIDE 9 MG/ML
INJECTION, SOLUTION INTRAVENOUS CONTINUOUS PRN
Status: DISCONTINUED | OUTPATIENT
Start: 2024-08-26 | End: 2024-08-26

## 2024-08-26 RX ORDER — DEXAMETHASONE SODIUM PHOSPHATE 10 MG/ML
INJECTION, SOLUTION INTRAMUSCULAR; INTRAVENOUS AS NEEDED
Status: DISCONTINUED | OUTPATIENT
Start: 2024-08-26 | End: 2024-08-26

## 2024-08-26 RX ORDER — HEPARIN SODIUM 1000 [USP'U]/ML
INJECTION, SOLUTION INTRAVENOUS; SUBCUTANEOUS CODE/TRAUMA/SEDATION MEDICATION
Status: DISCONTINUED | OUTPATIENT
Start: 2024-08-26 | End: 2024-08-26 | Stop reason: HOSPADM

## 2024-08-26 RX ORDER — ONDANSETRON 2 MG/ML
INJECTION INTRAMUSCULAR; INTRAVENOUS AS NEEDED
Status: DISCONTINUED | OUTPATIENT
Start: 2024-08-26 | End: 2024-08-26

## 2024-08-26 RX ORDER — CEFAZOLIN SODIUM 2 G/50ML
SOLUTION INTRAVENOUS AS NEEDED
Status: DISCONTINUED | OUTPATIENT
Start: 2024-08-26 | End: 2024-08-26

## 2024-08-26 RX ORDER — LIDOCAINE HYDROCHLORIDE 10 MG/ML
INJECTION, SOLUTION EPIDURAL; INFILTRATION; INTRACAUDAL; PERINEURAL AS NEEDED
Status: DISCONTINUED | OUTPATIENT
Start: 2024-08-26 | End: 2024-08-26

## 2024-08-26 RX ORDER — SODIUM CHLORIDE 9 MG/ML
20 INJECTION, SOLUTION INTRAVENOUS ONCE
Status: COMPLETED | OUTPATIENT
Start: 2024-08-26 | End: 2024-08-26

## 2024-08-26 RX ORDER — ROCURONIUM BROMIDE 10 MG/ML
INJECTION, SOLUTION INTRAVENOUS AS NEEDED
Status: DISCONTINUED | OUTPATIENT
Start: 2024-08-26 | End: 2024-08-26

## 2024-08-26 RX ORDER — SACCHAROMYCES BOULARDII 250 MG
CAPSULE ORAL
COMMUNITY

## 2024-08-26 RX ORDER — HYDROMORPHONE HCL IN WATER/PF 6 MG/30 ML
0.2 PATIENT CONTROLLED ANALGESIA SYRINGE INTRAVENOUS
Status: DISCONTINUED | OUTPATIENT
Start: 2024-08-26 | End: 2024-08-26 | Stop reason: HOSPADM

## 2024-08-26 RX ORDER — PHENYLEPHRINE HCL IN 0.9% NACL 1 MG/10 ML
SYRINGE (ML) INTRAVENOUS AS NEEDED
Status: DISCONTINUED | OUTPATIENT
Start: 2024-08-26 | End: 2024-08-26

## 2024-08-26 RX ORDER — GLYCOPYRROLATE 0.2 MG/ML
INJECTION INTRAMUSCULAR; INTRAVENOUS AS NEEDED
Status: DISCONTINUED | OUTPATIENT
Start: 2024-08-26 | End: 2024-08-26

## 2024-08-26 RX ORDER — PANTOPRAZOLE SODIUM 40 MG/1
40 TABLET, DELAYED RELEASE ORAL
Status: DISCONTINUED | OUTPATIENT
Start: 2024-08-27 | End: 2024-08-26 | Stop reason: HOSPADM

## 2024-08-26 RX ORDER — ACETAMINOPHEN 325 MG/1
650 TABLET ORAL EVERY 4 HOURS PRN
Status: DISCONTINUED | OUTPATIENT
Start: 2024-08-26 | End: 2024-08-26 | Stop reason: HOSPADM

## 2024-08-26 RX ADMIN — DEXAMETHASONE SODIUM PHOSPHATE 10 MG: 10 INJECTION, SOLUTION INTRAMUSCULAR; INTRAVENOUS at 10:03

## 2024-08-26 RX ADMIN — Medication 40 MCG: at 10:16

## 2024-08-26 RX ADMIN — CEFAZOLIN SODIUM 2000 MG: 2 SOLUTION INTRAVENOUS at 10:02

## 2024-08-26 RX ADMIN — PROPOFOL 50 MG: 10 INJECTION, EMULSION INTRAVENOUS at 09:57

## 2024-08-26 RX ADMIN — ROCURONIUM BROMIDE 20 MG: 10 INJECTION, SOLUTION INTRAVENOUS at 10:53

## 2024-08-26 RX ADMIN — PHENYLEPHRINE HYDROCHLORIDE 40 MCG/MIN: 10 INJECTION INTRAVENOUS at 10:23

## 2024-08-26 RX ADMIN — FENTANYL CITRATE 100 MCG: 50 INJECTION INTRAMUSCULAR; INTRAVENOUS at 09:56

## 2024-08-26 RX ADMIN — PROTAMINE SULFATE 50 MG: 10 INJECTION, SOLUTION INTRAVENOUS at 11:33

## 2024-08-26 RX ADMIN — LIDOCAINE HYDROCHLORIDE 50 MG: 10 INJECTION, SOLUTION EPIDURAL; INFILTRATION; INTRACAUDAL; PERINEURAL at 09:56

## 2024-08-26 RX ADMIN — ACETAMINOPHEN 650 MG: 325 TABLET ORAL at 12:47

## 2024-08-26 RX ADMIN — SUGAMMADEX 200 MG: 100 INJECTION, SOLUTION INTRAVENOUS at 11:38

## 2024-08-26 RX ADMIN — GLYCOPYRROLATE 0.3 MG: 0.2 INJECTION, SOLUTION INTRAMUSCULAR; INTRAVENOUS at 10:42

## 2024-08-26 RX ADMIN — ROCURONIUM BROMIDE 20 MG: 10 INJECTION, SOLUTION INTRAVENOUS at 10:27

## 2024-08-26 RX ADMIN — Medication 20 MCG: at 10:13

## 2024-08-26 RX ADMIN — ONDANSETRON 4 MG: 2 INJECTION INTRAMUSCULAR; INTRAVENOUS at 11:37

## 2024-08-26 RX ADMIN — ROCURONIUM BROMIDE 50 MG: 10 INJECTION, SOLUTION INTRAVENOUS at 09:57

## 2024-08-26 RX ADMIN — PROPOFOL 150 MG: 10 INJECTION, EMULSION INTRAVENOUS at 09:56

## 2024-08-26 RX ADMIN — SODIUM CHLORIDE: 0.9 INJECTION, SOLUTION INTRAVENOUS at 09:44

## 2024-08-26 RX ADMIN — SUGAMMADEX 200 MG: 100 INJECTION, SOLUTION INTRAVENOUS at 11:43

## 2024-08-26 RX ADMIN — SODIUM CHLORIDE 20 ML/HR: 0.9 INJECTION, SOLUTION INTRAVENOUS at 08:02

## 2024-08-26 NOTE — TELEPHONE ENCOUNTER
We only have 2 weeks left of the 5mg samples but we have 2.5mg samples as well that can be doubled up.     Placed call to Melecio. He is ok with doubling up on the 2.5 mg.     Eliquis 5mg  LOT: [id number]  EXP: Sept 2025  Dispensed: 2 boxes    Eliquis 2.5mg  LOT: [id number]  EXP: Dec 2025  Dispensed: 1 box  LOT: [id number]  EXP: April 2026  Dispensed: 3 boxes

## 2024-08-26 NOTE — ANESTHESIA POSTPROCEDURE EVALUATION
Post-Op Assessment Note    CV Status:  Stable  Pain Score: 0    Pain management: adequate       Mental Status:  Alert and awake   Hydration Status:  Euvolemic   PONV Controlled:  Controlled   Airway Patency:  Patent     Post Op Vitals Reviewed: Yes    No anethesia notable event occurred.    Staff: Anesthesiologist, CRNA               BP   135/72   Temp      Pulse  77   Resp   16   SpO2   100

## 2024-08-26 NOTE — DISCHARGE INSTR - AVS FIRST PAGE
NEW MEDICATIONS:  Please start taking Eliquis 5 mg twice daily for 60 days in the post-ablation setting.  Discontinue flecainide   Continue all other medications as previously taking     RESTRICTIONS:   No heavy lifting or strenuous activity for one week.    Do not submerge in any water for one week or until groin heals. You may shower. Please let soap and water run over the groins. Do not scrub the groins. Pat the area dry. You may place band-aid on groins daily for no more than 5 days.    If you notice bleeding, swelling, or large firm lumps near ablation incision, please contact Dr. García's office at [phone].

## 2024-08-26 NOTE — INTERVAL H&P NOTE
H&P reviewed. After examining the patient I find no changes in the patients condition since the H&P had been written.    There were no vitals filed for this visit.    Patient with history of paroxysmal atrial fibrillation as well as hypertension, and obesity.  She has [id number] score of 3 however is currently not on anticoagulation secondary to history of GI bleeding..  She has history of paroxysmal atrial fibrillation underwent 2-week Zio monitor 9/13/2022 showing PACs as well as SVT which may or may not have represented atrial fibrillation versus atrial tachycardia.  Patient underwent loop recorder implantation on 10/18/2023 which did show evidence of atrial fibrillation.  She is currently on rate control with Lopressor daily as well as flecainide 100 mg as needed for recurrent atrial fibrillation.  [facility] to undergo atrial fibrillation ablation with Dr. García.  Discussion about Watchman procedure in the outpatient setting, however patient will need to be started on anticoagulation in the post ablation setting.

## 2024-08-26 NOTE — ANESTHESIA PREPROCEDURE EVALUATION
Procedure:  Cardiac eps/afib ablation PFA (Chest)    Relevant Problems   ANESTHESIA (within normal limits)      CARDIO   (+) Benign essential HTN   (+) PAF (paroxysmal atrial fibrillation) (HCC)      ENDO   (+) Postablative hypothyroidism      GI/HEPATIC (within normal limits)      /RENAL (within normal limits)      GYN (within normal limits)      HEMATOLOGY (within normal limits)      MUSCULOSKELETAL (within normal limits)      NEURO/PSYCH (within normal limits)      PULMONARY (within normal limits)        Physical Exam    Airway    Mallampati score: II  TM Distance: >3 FB  Neck ROM: full     Dental   Comment: Crowns x 2     Cardiovascular  Cardiovascular exam normal    Pulmonary  Pulmonary exam normal     Other Findings  post-pubertal.      Anesthesia Plan  ASA Score- 3     Anesthesia Type- general with ASA Monitors.         Additional Monitors:     Airway Plan: ETT.           Plan Factors-Exercise tolerance (METS): >4 METS.    Chart reviewed. EKG reviewed. Imaging results reviewed. Existing labs reviewed. Patient summary reviewed.    Patient is not a current smoker.              Induction- intravenous.    Postoperative Plan- Plan for postoperative opioid use.     Perioperative Resuscitation Plan - Level 1 - Full Code.       Informed Consent- Anesthetic plan and risks discussed with patient.  I personally reviewed this patient with the CRNA. Discussed and agreed on the Anesthesia Plan with the CRNA..    Discussed General Anesthesia with patient including but not limited to risk of cardiac insult, pulmonary complication, stroke, reaction to medications and death. All questions answered and consent was obtained.

## 2024-08-26 NOTE — TELEPHONE ENCOUNTER
Spoke with patient's  Melecio, patient just had an ablation done. He was told by Dr Arguello that she would receive a month of Eliquis 5mg 2x/day samples and is inquiring if he can stop by the office to pick them up.    No answer when calling office.    Please advise Melecio at [phone]

## 2024-08-28 ENCOUNTER — TELEPHONE (OUTPATIENT)
Age: 74
End: 2024-08-28

## 2024-08-28 NOTE — TELEPHONE ENCOUNTER
Caller: Patient     Doctor: Tang Arguello MD     Reason for call: Patient called and stated while in the hospital she saw Micheal IVY and was advised to call office and let him know how she is feeling. Pt states that her heart keeps going in and out of rhythm and she would like to know if this is ok? Pt stated she feels fine and her oxygen levels are ok.  Please call pt and advise.      Call back#: [phone] (Patient)

## 2024-08-28 NOTE — TELEPHONE ENCOUNTER
"Spoke with patient.  Complains of palpitations since yesterday which feel more like a \"skipped\" beat.  HR per pulse ox has been in the 60 bpm range, unable to check BP.  O2 98%.      I will ask Rachel Briscoe to check on patient's loop readings for past couple of days and get her input on those to hopefully explain what she is feeling.      Patient is s/p afib ablation 8/26/24 with Dr. García.  "

## 2024-09-25 ENCOUNTER — HOSPITAL ENCOUNTER (OUTPATIENT)
Dept: ULTRASOUND IMAGING | Facility: CLINIC | Age: 74
Discharge: HOME | End: 2024-09-25
Payer: MEDICARE

## 2024-09-25 ENCOUNTER — HOSPITAL ENCOUNTER (OUTPATIENT)
Dept: MAMMOGRAPHY | Facility: CLINIC | Age: 74
Discharge: HOME | End: 2024-09-25
Payer: MEDICARE

## 2024-09-25 VITALS — HEIGHT: 66 IN | BODY MASS INDEX: 34.87 KG/M2 | WEIGHT: 217 LBS

## 2024-09-25 DIAGNOSIS — N63.0 LUMP IN FEMALE BREAST: ICD-10-CM

## 2024-09-25 PROCEDURE — 77066 DX MAMMO INCL CAD BI: CPT

## 2024-09-25 PROCEDURE — 76642 ULTRASOUND BREAST LIMITED: CPT

## 2024-09-25 PROCEDURE — G0279 TOMOSYNTHESIS, MAMMO: HCPCS

## 2024-09-25 NOTE — PROGRESS NOTES
Met with patient and Dr. Holland                 regarding recommendation for;      __x___ RIGHT ______LEFT      __x___Ultrasound guided  ______Stereotactic  Breast biopsy.      __x___Verbalized understanding.      Blood thinners:  __x___yes _____no    Date stopped: ____n/a_______    Biopsy teaching sheet given:  ____x___yes ______no    Pre procedure health information obtained. Pt has a hx of A-Fib with recent oblation. Pt has HTN, GERD and hypothyroidism.

## 2024-09-26 ENCOUNTER — HOSPITAL ENCOUNTER (OUTPATIENT)
Dept: ULTRASOUND IMAGING | Facility: CLINIC | Age: 74
Discharge: HOME | End: 2024-09-26
Payer: MEDICARE

## 2024-09-26 ENCOUNTER — HOSPITAL ENCOUNTER (OUTPATIENT)
Dept: MAMMOGRAPHY | Facility: CLINIC | Age: 74
Discharge: HOME | End: 2024-09-26
Payer: MEDICARE

## 2024-09-26 VITALS — DIASTOLIC BLOOD PRESSURE: 89 MMHG | SYSTOLIC BLOOD PRESSURE: 143 MMHG | HEART RATE: 67 BPM

## 2024-09-26 DIAGNOSIS — R92.8 ABNORMAL ULTRASOUND OF BREAST: ICD-10-CM

## 2024-09-26 DIAGNOSIS — R92.8 ABNORMAL MAMMOGRAM: ICD-10-CM

## 2024-09-26 PROCEDURE — 88305 TISSUE EXAM BY PATHOLOGIST: CPT | Performed by: PATHOLOGY

## 2024-09-26 PROCEDURE — 88342 IMHCHEM/IMCYTCHM 1ST ANTB: CPT | Performed by: PATHOLOGY

## 2024-09-26 PROCEDURE — 19083 BX BREAST 1ST LESION US IMAG: CPT

## 2024-09-26 PROCEDURE — A4648 IMPLANTABLE TISSUE MARKER: HCPCS

## 2024-09-26 PROCEDURE — 88341 IMHCHEM/IMCYTCHM EA ADD ANTB: CPT | Performed by: PATHOLOGY

## 2024-09-26 RX ORDER — LIDOCAINE HYDROCHLORIDE 10 MG/ML
5 INJECTION, SOLUTION EPIDURAL; INFILTRATION; INTRACAUDAL; PERINEURAL ONCE
Status: COMPLETED | OUTPATIENT
Start: 2024-09-26 | End: 2024-09-26

## 2024-09-26 RX ADMIN — LIDOCAINE HYDROCHLORIDE 5 ML: 10 INJECTION, SOLUTION EPIDURAL; INFILTRATION; INTRACAUDAL; PERINEURAL at 10:21

## 2024-09-26 NOTE — PROGRESS NOTES
Progress Note -     Name: Inés Duran 73 y.o. female I MRN: [id number]  Unit/Bed#:  I Date of Admission: 9/26/2024   Date of Service: 9/26/2024 I Hospital Day: 0     Assessment & Plan      Procedure type:    ___x__ultrasound guided _____stereotactic    Breast:    _____Left __x___Right    Location: 10 o'clock 8 cmfn    Needle: 12g    # of passes: 4    Clip: Butterfly     Performed by: Dr. Holland     Pressure held for 5 minutes by: Yi Atwood Strips:    ___X__yes _____no    Band aid:    __X___yes_____no    Tolerated procedure:    __X___yes _____no

## 2024-09-26 NOTE — PROGRESS NOTES
Progress Note -     Name: Inés Duran 73 y.o. female I MRN: [id number]  Unit/Bed#:  I Date of Admission: 9/26/2024   Date of Service: 9/26/2024 I Hospital Day: 0     Assessment & Plan      Ice pack given:    __X___yes _____no    Discharge instructions reviewed and given to patient:    __X___yes _____no    Discharged via:    __X___amulatory    _____wheelchair    _____stretcher    Biopsy site clean and dry with no bleeding on discharge:    __X___yes ____no

## 2024-09-26 NOTE — PROGRESS NOTES
Progress Note -     Name: Inés Duran 73 y.o. female I MRN: [id number]  Unit/Bed#:  I Date of Admission: 9/26/2024   Date of Service: 9/26/2024 I Hospital Day: 0     Assessment & Plan      Patient arrived via:    __X___ambulatory    _____wheelchair    _____stretcher      Domestic violence screen    ___X___negative______positive    Breast Implants:    _______yes ____X____no

## 2024-09-27 NOTE — PROGRESS NOTES
Post procedure call completed 9/27/2024 8:30    Bleeding: _____yes __X___no    Pain: _____yes ___X___no    Redness/Swelling: ______yes ___X___no    Band aid removed: _____yes ___X__no (discussed removing when she showers)    Steri-Strips intact: ___X___yes _____no (discussed with patient to remove steri strips on .10/1/2024.. if they have not come off on their own)    Pt with no questions at this time, adv will call when results available, adv to call with any questions or concerns, has name/# for contact

## 2024-09-30 PROCEDURE — 88305 TISSUE EXAM BY PATHOLOGIST: CPT | Performed by: PATHOLOGY

## 2024-09-30 PROCEDURE — 88341 IMHCHEM/IMCYTCHM EA ADD ANTB: CPT | Performed by: PATHOLOGY

## 2024-09-30 PROCEDURE — 88342 IMHCHEM/IMCYTCHM 1ST ANTB: CPT | Performed by: PATHOLOGY

## 2024-10-01 ENCOUNTER — TELEPHONE (OUTPATIENT)
Dept: MAMMOGRAPHY | Facility: CLINIC | Age: 74
End: 2024-10-01

## 2024-10-01 NOTE — TELEPHONE ENCOUNTER
I just wanted to let you know that the above patient was given her negative breast biopsy results.  The patient had no questions and was advised to return to yearly screenings.     If you have any questions or need further assistance please let me know.    Thank you,  YANETH Good,RN  Breast Nurse Navigator

## 2024-10-24 NOTE — PROGRESS NOTES
Electrophysiology Follow Up  [facility]  [facility] [address], [place] [address]    Name: Inés Duran  : 1950  MRN: [id number]    ASSESSMENT/PLAN:  Paroxysmal atrial fibrillation s/p PFA ablation (PVI) 24 by Dr. Raquel Lowery 2022 showed PAC's + SVT with some episodes possibly being afib vs AT  Loop implanted 10/2023 which did show definitive afib\  Maintained on scheduled flecainide in the past but notes afib got worse w/ scheduled dosing  Now taking 100mg PO flecainide PRN for afib episodes   Continued w/ occasional episode, now s/p afib ablation 24  [id number] 3 (age, sex, HTN) - not on AC given GI bleed hx  Rate control: Nebivolol 5mg daily w/ PRN metoprolol tartrate 25mg when in afib   ECHO (2023) - EF 65%, normal wall motion, LA size WNL  HTN  BP in office today 114/72  Hx of GI bleed  Obesity      Discussion/Plan:    Patient recently underwent afib ablation on 24 by Dr. García    Since this procedure they have been maintained on PRN flecainide 100mg + PRN metporolol tartrate 25mg for afib episoes + scheduled daily nebivolol 5mg     She has been compliant with her post-ablation Eliquis 5mg BID course, but was not on AC prior to her procedure given prior GI bleed hx    Her [id number] is 3 and her HASBLED is 3    She should continue her Eliquis for now given she remains in the healing period, but we did talk about the possibility of watchman placement for which she remains agreeable.     She has follow up in November w/ Dr. Villagomez to discuss the watchman        Otherwise since her ablation she reports she has been feeling well overall, but does note occasional palpitation (not as severe when compared to pre-ablation). She otherwise denies other acute cardiac complaint    She affirms her groin access sites have healed well    EKG in office today showing NSR w/ ventricular rate 68bpm    Her loop was interrogated in  office today by myself showing no afib recurrence post-ablation    No acute medication changes made today. To continue monitoring loop moving forwards    Modifiable risk factors for atrial fibrillation were discussed today including weight loss, avoiding alcohol/tobacco products, and treatment of MEGA/HTN/DM     Patient has been instructed to follow up in our EP office in November w/ Dr. Villagomez or as needed. She will call our office with any questions or concerns in the meantime.    Rhythm History:   Atrial fibrillation:      Atrial flutter:      SVT:      VT/VF/PVC:     Device history:   Pacemaker:     Defibrillator:     BIV PPM:     BIV ICD:     ILR:    Interim History/HPI:   Interim history: Inés Duran is a 73 y.o. female with a PMH of afib s/p ablation, GI bleed hx, HTN, and obesity.    She presents today for routine outpatient follow up given her recent afib ablation 08/26/24 by Dr. García. She reports that since this procedure she has been feeling well overall, but does note occasional palpitation (not as severe when compared to pre-ablation). She otherwise denies other acute cardiac complaint    EKG: NSR w/ ventricular rate 68bpm    Review of Systems   Constitutional:  Negative for appetite change, chills, fatigue, fever and unexpected weight change.   Respiratory:  Negative for chest tightness and shortness of breath.    Cardiovascular:  Negative for chest pain, palpitations and leg swelling.   Neurological:  Negative for dizziness, syncope, weakness and light-headedness.         OBJECTIVE:   Vitals:   LMP  (LMP Unknown)   There is no height or weight on file to calculate BMI.        Physical Exam:   Physical Exam  Constitutional:       General: She is not in acute distress.     Appearance: Normal appearance. She is not ill-appearing.   HENT:      Head: Normocephalic and atraumatic.      Nose: Nose normal.   Eyes:      General:         Right eye: No discharge.         Left eye: No discharge.   Neck:       Vascular: No carotid bruit.   Cardiovascular:      Rate and Rhythm: Normal rate and regular rhythm.      Pulses: Normal pulses.      Heart sounds: Normal heart sounds.   Pulmonary:      Effort: Pulmonary effort is normal.      Breath sounds: Normal breath sounds.   Musculoskeletal:      Right lower leg: No edema.      Left lower leg: No edema.   Skin:     General: Skin is warm and dry.      Capillary Refill: Capillary refill takes less than 2 seconds.   Neurological:      Mental Status: She is alert.            Medications:      Current Outpatient Medications:     apixaban (Eliquis) 5 mg, Take 1 tablet (5 mg total) by mouth 2 (two) times a day, Disp: 60 tablet, Rfl: 0    Cholecalciferol (Vitamin D) 50 MCG (2000 UT) tablet, Take 2,000 Units by mouth daily, Disp: , Rfl:     levothyroxine 100 mcg tablet, Take 100 mcg by mouth daily, Disp: , Rfl: 2    metoprolol tartrate (LOPRESSOR) 25 mg tablet, Take 25 mg by mouth as needed Take two tablets as needed for A fib episodes, Disp: , Rfl:     Multiple Vitamins-Minerals (multivitamin with minerals) tablet, Take 1 tablet by mouth daily, Disp: , Rfl:     nebivolol (BYSTOLIC) 5 mg tablet, TAKE 1 TABLET BY MOUTH DAILY, Disp: 90 tablet, Rfl: 3    NON FORMULARY, Take 500 mg by mouth every other day D-Mannose, Disp: , Rfl:     omeprazole (PriLOSEC) 20 mg delayed release capsule, TAKE 1 CAPSULE(20 MG) BY MOUTH DAILY, Disp: 90 capsule, Rfl: 3    saccharomyces boulardii (FLORASTOR) 250 mg capsule, Take by mouth probiotic, Disp: , Rfl:     Current Facility-Administered Medications:     ondansetron (ZOFRAN-ODT) dispersible tablet 4 mg, 4 mg, Oral, Q6H PRN, Johnny Glass PA-C       Historical Information   Past Medical History:   Diagnosis Date    Disease of thyroid gland     Hypertension        Past Surgical History:   Procedure Laterality Date    BREAST BIOPSY Right 1996    core bx  benign    BREAST BIOPSY Right 09/26/2024    1000 8cmfn    BREAST LUMPECTOMY Right 1990    benign     BUNIONECTOMY Left     CARDIAC ELECTROPHYSIOLOGY PROCEDURE N/A 10/18/2023    Procedure: Cardiac loop recorder implant;  Surgeon: Leonel Olivas MD;  Location: [facility];  Service: Cardiology    CARDIAC ELECTROPHYSIOLOGY PROCEDURE N/A 08/26/2024    Procedure: Cardiac eps/afib ablation PFA;  Surgeon: Jh García DO;  Location: [facility];  Service: Cardiology    HAND SURGERY Right     ganglion cyst removal    ORIF ANKLE FRACTURE Right 2005    ORIF TIBIA & FIBULA FRACTURES Left 02/13/2019    Procedure: OPEN REDUCTION W/ INTERNAL FIXATION (ORIF) ANKLE;  Surgeon: Marnie Silvestre DO;  Location: [facility];  Service: Orthopedics    THYROIDECTOMY      TONSILLECTOMY AND ADENOIDECTOMY      US GUIDED BREAST BIOPSY RIGHT COMPLETE Right 9/26/2024       Social History     Substance and Sexual Activity   Alcohol Use No     Social History     Substance and Sexual Activity   Drug Use No     Social History     Tobacco Use   Smoking Status Never    Passive exposure: Yes   Smokeless Tobacco Never       Family History   Problem Relation Age of Onset    No Known Problems Mother     Lung cancer Father 67    No Known Problems Sister     No Known Problems Daughter     No Known Problems Maternal Grandmother     No Known Problems Maternal Grandfather     No Known Problems Paternal Grandmother     No Known Problems Paternal Grandfather     No Known Problems Maternal Aunt     No Known Problems Maternal Aunt     No Known Problems Maternal Aunt     No Known Problems Maternal Aunt     No Known Problems Paternal Aunt     No Known Problems Paternal Aunt     No Known Problems Paternal Aunt     Coronary artery disease Family     Colon cancer Cousin 80    Lung cancer Cousin 80         Labs & Results:  Below is the patient's most recent value for Albumin, ALT, AST, BUN, Calcium, Chloride, Cholesterol, CO2, Creatinine, GFR, Glucose, HDL, Hematocrit, Hemoglobin, Hemoglobin A1C, LDL, Magnesium, Phosphorus, Platelets, Potassium, PSA,  Sodium, Triglycerides, and WBC.   Lab Results   Component Value Date    ALT 14 08/07/2024    ALT 14 08/07/2024    AST 12 08/07/2024    AST 12 08/07/2024    BUN 15 08/26/2024    CALCIUM 8.5 08/26/2024     08/26/2024    CO2 24 08/26/2024    CREATININE 0.65 08/26/2024    HDL 52 08/07/2024    HCT 42.9 08/26/2024    HGB 14.0 08/26/2024    MG 2.0 02/09/2015     08/26/2024    K 3.7 08/26/2024     02/09/2015    TRIG 96 08/07/2024    WBC 6.16 08/26/2024     Note: for a comprehensive list of the patient's lab results, access the Results Review activity.

## 2024-10-29 ENCOUNTER — OFFICE VISIT (OUTPATIENT)
Dept: CARDIOLOGY CLINIC | Facility: CLINIC | Age: 74
End: 2024-10-29
Payer: MEDICARE

## 2024-10-29 VITALS
BODY MASS INDEX: 35.41 KG/M2 | WEIGHT: 220.3 LBS | HEIGHT: 66 IN | SYSTOLIC BLOOD PRESSURE: 114 MMHG | HEART RATE: 68 BPM | DIASTOLIC BLOOD PRESSURE: 72 MMHG

## 2024-10-29 DIAGNOSIS — Z87.19 HISTORY OF GI BLEED: ICD-10-CM

## 2024-10-29 DIAGNOSIS — I10 BENIGN ESSENTIAL HTN: ICD-10-CM

## 2024-10-29 DIAGNOSIS — E66.01 OBESITY, MORBID (HCC): ICD-10-CM

## 2024-10-29 DIAGNOSIS — I48.0 PAF (PAROXYSMAL ATRIAL FIBRILLATION) (HCC): Primary | ICD-10-CM

## 2024-10-29 PROCEDURE — 99214 OFFICE O/P EST MOD 30 MIN: CPT

## 2024-10-29 PROCEDURE — 93000 ELECTROCARDIOGRAM COMPLETE: CPT

## 2024-11-07 ENCOUNTER — OFFICE VISIT (OUTPATIENT)
Dept: CARDIOLOGY CLINIC | Facility: CLINIC | Age: 74
End: 2024-11-07
Payer: COMMERCIAL

## 2024-11-07 VITALS
WEIGHT: 218.9 LBS | DIASTOLIC BLOOD PRESSURE: 88 MMHG | BODY MASS INDEX: 35.18 KG/M2 | SYSTOLIC BLOOD PRESSURE: 128 MMHG | HEIGHT: 66 IN

## 2024-11-07 DIAGNOSIS — I48.0 PAF (PAROXYSMAL ATRIAL FIBRILLATION) (HCC): Primary | ICD-10-CM

## 2024-11-07 PROCEDURE — 99214 OFFICE O/P EST MOD 30 MIN: CPT | Performed by: INTERNAL MEDICINE

## 2024-11-07 PROCEDURE — 93000 ELECTROCARDIOGRAM COMPLETE: CPT | Performed by: INTERNAL MEDICINE

## 2024-11-07 NOTE — PROGRESS NOTES
"HEART AND VASCULAR  CARDIAC ELECTROPHYSIOLOGY   [facility]  [facility]    Outpatient Follow-up  Today's Date: 11/07/24        Patient name: Inés Duran  YOB: 1950  Sex: female         Chief Complaint: referral for Watchman      ASSESSMENT:  Problem List Items Addressed This Visit          Cardiovascular and Mediastinum    PAF (paroxysmal atrial fibrillation) (HCC) - Primary    Relevant Orders    POCT ECG       74 yo female  1) Paroxysmal afib with loop in place, post PFA ablation Augu 2024 by Dr garcía and no afib recurrence since. CHADS2Vasc=3 Hasbled 3.   2) H/o Gi bleed and wasn't on AC prior to her ablatino      PLAN:  She is seeing me for Watchman and if she were having afib would be reasonable but since no further afib since her ablation not sure the benefit the Watchman would give her. I think there is no \"zero risk\" choice and if goal is stop anticoagulation since she is well monitored with loop we can do that and resume anticoagulation and Watchman if she has afib recurrence.  She can take aspirin 81mg instead. Will d/w Dr García since he did ablation.     Orders Placed This Encounter   Procedures    POCT ECG     There are no discontinued medications.      .............................................................................................    HPI/Subjective:     74 yo female post ablation here to discuss Watchman.     Please note HPI is listed by problem with with update following it, it is copied again in the assessment above and reflects medical decision making as well.           Past Medical History:   Diagnosis Date    Disease of thyroid gland     Hypertension        No Known Allergies  I reviewed the Home Medication list and Allergies in the chart.   Scheduled Meds:  Current Outpatient Medications   Medication Sig Dispense Refill    Cholecalciferol (Vitamin D) 50 MCG (2000 UT) tablet Take 2,000 Units by mouth daily      levothyroxine 100 mcg " tablet Take 100 mcg by mouth daily  2    Multiple Vitamins-Minerals (multivitamin with minerals) tablet Take 1 tablet by mouth daily      nebivolol (BYSTOLIC) 5 mg tablet TAKE 1 TABLET BY MOUTH DAILY 90 tablet 3    NON FORMULARY Take 500 mg by mouth every other day D-Mannose      omeprazole (PriLOSEC) 20 mg delayed release capsule TAKE 1 CAPSULE(20 MG) BY MOUTH DAILY 90 capsule 3    saccharomyces boulardii (FLORASTOR) 250 mg capsule Take by mouth probiotic      apixaban (Eliquis) 5 mg Take 1 tablet (5 mg total) by mouth 2 (two) times a day 60 tablet 0    metoprolol tartrate (LOPRESSOR) 25 mg tablet Take 25 mg by mouth as needed Take two tablets as needed for A fib episodes       Current Facility-Administered Medications   Medication Dose Route Frequency Provider Last Rate Last Admin    ondansetron (ZOFRAN-ODT) dispersible tablet 4 mg  4 mg Oral Q6H PRN Johnny Glass PA-C         PRN Meds:.  ondansetron        Family History   Problem Relation Age of Onset    No Known Problems Mother     Lung cancer Father 67    No Known Problems Sister     No Known Problems Daughter     No Known Problems Maternal Grandmother     No Known Problems Maternal Grandfather     No Known Problems Paternal Grandmother     No Known Problems Paternal Grandfather     No Known Problems Maternal Aunt     No Known Problems Maternal Aunt     No Known Problems Maternal Aunt     No Known Problems Maternal Aunt     No Known Problems Paternal Aunt     No Known Problems Paternal Aunt     No Known Problems Paternal Aunt     Coronary artery disease Family     Colon cancer Cousin 80    Lung cancer Cousin 80       Social History     Socioeconomic History    Marital status: /Civil Union     Spouse name: Not on file    Number of children: Not on file    Years of education: Not on file    Highest education level: Not on file   Occupational History    Not on file   Tobacco Use    Smoking status: Never     Passive exposure: Yes    Smokeless tobacco:  "Never   Vaping Use    Vaping status: Never Used   Substance and Sexual Activity    Alcohol use: No    Drug use: No    Sexual activity: Not Currently   Other Topics Concern    Not on file   Social History Narrative    Not on file     Social Determinants of Health     Financial Resource Strain: Not on file   Food Insecurity: No Food Insecurity (2/16/2024)    Nursing - Inadequate Food Risk Classification     Worried About Running Out of Food in the Last Year: Never true     Ran Out of Food in the Last Year: Never true     Ran Out of Food in the Last Year: Not on file   Transportation Needs: No Transportation Needs (2/16/2024)    PRAPARE - Transportation     Lack of Transportation (Medical): No     Lack of Transportation (Non-Medical): No   Physical Activity: Not on file   Stress: Not on file   Social Connections: Not on file   Intimate Partner Violence: Not on file   Housing Stability: Unknown (2/16/2024)    Housing Stability Vital Sign     Unable to Pay for Housing in the Last Year: Patient declined     Number of Times Moved in the Last Year: 1     Homeless in the Last Year: No         OBJECTIVE:    /88 (BP Location: Right arm, Patient Position: Sitting, Cuff Size: Standard)   Ht 5' 6\" (1.676 m)   Wt 99.3 kg (218 lb 14.4 oz)   LMP  (LMP Unknown)   BMI 35.33 kg/m²   Vitals:    11/07/24 1324   Weight: 99.3 kg (218 lb 14.4 oz)     GEN: No acute distress, Alert and oriented, well appearing  HEENT:External ears normal, oral pharynx clear, mucous membranes moist  EYES: Pupils equal, sclera anicteric, midline, normal conjuctiva  NECK: No JVD, supple, no obvious masses or thryomegaly or goiter  CARDIOVASCULAR:  RRR, No murmur, rub, gallops S1,S2  LUNGS: Clear To auscultation bilaterally, normal effort, no rales, rhonchi, crackles   ABDOMEN:  nondistended,  without obvious organomegaly or ascites  EXTREMITIES/VASCULAR:  No edema. warm an well perfused.  PSYCH: Normal Affect,  linear speech pattern without evidence of " "psychosis.   NEURO: Grossly intact, moving all extremiteis equal, face symmetric, alert and responsive, no obvious focal defecits   GAIT:  Ambulates normally without difficulty  HEME: No bleeding, bruising, petechia, purpura   SKIN: No significant rashes on visibile skin, warm, no diaphoresis or pallor.     Lab Results:       LABS:      Chemistry        Component Value Date/Time     02/09/2015 0245    K 3.7 08/26/2024 0758    K 4.2 08/07/2024 0924    K 4.2 08/07/2024 0924     08/26/2024 0758     08/07/2024 0924     08/07/2024 0924    CO2 24 08/26/2024 0758    CO2 26 08/07/2024 0924    CO2 26 08/07/2024 0924    BUN 15 08/26/2024 0758    BUN 13 08/07/2024 0924    BUN 13 08/07/2024 0924    CREATININE 0.65 08/26/2024 0758    CREATININE 0.61 08/07/2024 0924        Component Value Date/Time    CALCIUM 8.5 08/26/2024 0758    CALCIUM 8.5 08/07/2024 0924    CALCIUM 8.5 08/07/2024 0924    ALKPHOS 77 08/07/2024 0924    ALKPHOS 77 08/07/2024 0924    AST 12 08/07/2024 0924    AST 12 08/07/2024 0924    ALT 14 08/07/2024 0924    ALT 14 08/07/2024 0924    BILITOT 0.3 02/08/2015 0130            No results found for: \"CHOL\"  Lab Results   Component Value Date    HDL 52 08/07/2024     Lab Results   Component Value Date    LDLCALC 107 08/07/2024     Lab Results   Component Value Date    TRIG 96 08/07/2024     No results found for: \"CHOLHDL\"    IMAGING: No results found.     Cardiac testing:   No results found for this or any previous visit.    No results found for this or any previous visit.    No results found for this or any previous visit.    No results found for this or any previous visit.          I reviewed and interpreted the following LABS/EKG/TELE/IMAGING and below is summary of my interpretation (if data available):        Current EKG and Rhythm Strip:NSR 62bpm.      "

## 2024-11-19 DIAGNOSIS — K21.9 GASTROESOPHAGEAL REFLUX DISEASE WITHOUT ESOPHAGITIS: ICD-10-CM

## 2024-11-21 ENCOUNTER — REMOTE DEVICE CLINIC VISIT (OUTPATIENT)
Dept: CARDIOLOGY CLINIC | Facility: CLINIC | Age: 74
End: 2024-11-21
Payer: MEDICARE

## 2024-11-21 ENCOUNTER — RESULTS FOLLOW-UP (OUTPATIENT)
Dept: CARDIOLOGY CLINIC | Facility: CLINIC | Age: 74
End: 2024-11-21

## 2024-11-21 DIAGNOSIS — I48.0 PAF (PAROXYSMAL ATRIAL FIBRILLATION) (HCC): Primary | ICD-10-CM

## 2024-11-21 PROCEDURE — 93298 REM INTERROG DEV EVAL SCRMS: CPT | Performed by: INTERNAL MEDICINE

## 2024-11-21 NOTE — PROGRESS NOTES
"MDT LOOP II/ACTIVE SYSTEM IS MRI CONDITIONAL   CARELINK TRANSMISSION: LOOP RECORDER. PRESENTING RHYTHM NSR @ 75 BPM. BATTERY STATUS \"OK.\" NO PATIENT OR DEVICE ACTIVATED EPISODES. NORMAL DEVICE FUNCTION. DL   "

## 2025-02-05 ENCOUNTER — RESULTS FOLLOW-UP (OUTPATIENT)
Dept: CARDIOLOGY CLINIC | Facility: CLINIC | Age: 75
End: 2025-02-05

## 2025-02-05 ENCOUNTER — REMOTE DEVICE CLINIC VISIT (OUTPATIENT)
Dept: CARDIOLOGY CLINIC | Facility: CLINIC | Age: 75
End: 2025-02-05
Payer: MEDICARE

## 2025-02-05 DIAGNOSIS — I48.0 PAF (PAROXYSMAL ATRIAL FIBRILLATION) (HCC): Primary | ICD-10-CM

## 2025-02-05 PROCEDURE — 93298 REM INTERROG DEV EVAL SCRMS: CPT | Performed by: INTERNAL MEDICINE

## 2025-02-05 NOTE — PROGRESS NOTES
"MDT LOOP II/ACTIVE SYSTEM IS MRI CONDITIONAL   CARELINK TRANSMISSION: LOOP RECORDER. PRESENTING RHYTHM AF W/ RVR @ 125 BPM. BATTERY STATUS \"OK.\" 2 DEVICE CLASSIFIED AF EPISODES, LONGEST EPISODE IS 2:38 HOURS, AVAILABLE STRIPS DEMONSTRATE  ATRIAL FIBRILLATION.  AF BURDEN IS 12.3%. HX OF PAF. PT TAKES METOPROLOL SUC, BYSTOLIC AND ELIQUIS. NO PATIENT ACTIVATED EPISODES. NORMAL DEVICE FUNCTION. DL   "

## 2025-02-10 ENCOUNTER — RESULTS FOLLOW-UP (OUTPATIENT)
Dept: NON INVASIVE DIAGNOSTICS | Facility: HOSPITAL | Age: 75
End: 2025-02-10

## 2025-02-12 DIAGNOSIS — I48.0 PAF (PAROXYSMAL ATRIAL FIBRILLATION) (HCC): ICD-10-CM

## 2025-02-12 NOTE — TELEPHONE ENCOUNTER
----- Message from Federico Jaime PA-C sent at 2/12/2025  1:25 PM EST -----  Hello,    She is a patient of Dr. Hernández, but has seen Dr. Soto's for watchman consideration in the past. At that appointment plan was to continue monitoring loop while off AC and if she recurred to consider watchman.    She should resume taking Eliquis 5mg by mouth twice daily.    Dr. Soto's, would you be ok if I offer to schedule this patient for watchman?    Thanks!  ----- Message -----  From: Shanda Guy RN  Sent: 2/12/2025  12:58 PM EST  To: Jh García DO; Cardiology Ep Provider

## 2025-02-12 NOTE — TELEPHONE ENCOUNTER
Patient returned call to office.  She states on 2/5 when she was in Afib she felt it. She stated that prior to the Afib it was later in the evening and she had rolled over in bed and coughed hard.  Patient reports no other symptoms.    2/10 device check report states patient is in SR.  Patient reports she is not on AC just taking baby aspirin at this time.    I informed patient that Dr. García will review and our office will provide further recommendations once reviewed.  Patient was satisfied with our call.

## 2025-02-12 NOTE — TELEPHONE ENCOUNTER
Eliquis ordered and sent to [facility]. Patient will inform us if it is affordable if not then we will look to provide samples.    Patient is still taking ASA at this time and wanted to clarification if she is to stop taking the ASA. Please advise.    I informed patient we would be in contact with her regarding potential plans for watchman and if she is to take ASA in addition to Eliquis.

## 2025-02-13 ENCOUNTER — TELEPHONE (OUTPATIENT)
Dept: CARDIAC SURGERY | Facility: CLINIC | Age: 75
End: 2025-02-13

## 2025-02-13 NOTE — TELEPHONE ENCOUNTER
Spoke with patient's wife and confirmed 3/20/25 for [facility]. Informed her I will call her last week of February to discuss pre procedure instructions.

## 2025-02-13 NOTE — TELEPHONE ENCOUNTER
Spoke with patient. She will be in today to  samples of Xarelto. She is aware that she will be taking 20 mg Daily till her ablation/watchman is completed.  Dr. Villagomez has approved this change. Currently out of Eliquis samples.    Clinical team to put samples to the side for patient for  today.

## 2025-02-13 NOTE — TELEPHONE ENCOUNTER
Pt called and reported that she went to  the Eliquis and found that it was $600. She was under the impression that she might be able to  some samples as this is too expensive for her.   Please call the patient and let her know if this is an option for her.

## 2025-02-25 ENCOUNTER — OFFICE VISIT (OUTPATIENT)
Dept: CARDIOLOGY CLINIC | Facility: CLINIC | Age: 75
End: 2025-02-25
Payer: MEDICARE

## 2025-02-25 VITALS
DIASTOLIC BLOOD PRESSURE: 72 MMHG | HEIGHT: 66 IN | OXYGEN SATURATION: 97 % | BODY MASS INDEX: 34.49 KG/M2 | SYSTOLIC BLOOD PRESSURE: 124 MMHG | HEART RATE: 64 BPM | WEIGHT: 214.6 LBS

## 2025-02-25 DIAGNOSIS — Z87.19 HISTORY OF GI BLEED: ICD-10-CM

## 2025-02-25 DIAGNOSIS — I10 BENIGN ESSENTIAL HTN: ICD-10-CM

## 2025-02-25 DIAGNOSIS — K21.9 GASTROESOPHAGEAL REFLUX DISEASE WITHOUT ESOPHAGITIS: ICD-10-CM

## 2025-02-25 DIAGNOSIS — I48.0 PAF (PAROXYSMAL ATRIAL FIBRILLATION) (HCC): Primary | ICD-10-CM

## 2025-02-25 DIAGNOSIS — E66.01 OBESITY, MORBID (HCC): ICD-10-CM

## 2025-02-25 PROCEDURE — 99214 OFFICE O/P EST MOD 30 MIN: CPT | Performed by: STUDENT IN AN ORGANIZED HEALTH CARE EDUCATION/TRAINING PROGRAM

## 2025-02-25 RX ORDER — FLECAINIDE ACETATE 100 MG/1
100 TABLET ORAL AS NEEDED
Start: 2025-02-25

## 2025-02-25 NOTE — PROGRESS NOTES
Cardiology Consultation     Inés Duran  [id number]  1950  [facility] CARDIOLOGY [place]  [address] [address]      1. PAF (paroxysmal atrial fibrillation) (HCC)  flecainide (TAMBOCOR) 100 mg tablet    rivaroxaban (Xarelto) 20 mg tablet      2. Benign essential HTN        3. Gastroesophageal reflux disease without esophagitis        4. Obesity, morbid (HCC)        5. History of GI bleed                Discussion/Summary:  Paroxysmal atrial fibrillation   -initially related to hyperthyroidism, but now having recurrent intermittent palpitations   -CHADS2 Vasc score of 3 (hypertension, female, agex1)  -reports cutting out alcohol as this was a major trigger  -Previously not on NOAC due to GI bleed on full dose aspirin, was tolerating aspirin 81 mg daily, now back on Xarelto 20 mg daily  - Following with EP Dr. García  - Underwent loop recorder implantation 10/18/2023  - Currently on nebivolol 5 mg daily  -Takes flecainide 100 mg as needed for recurrent A-fib with Lopressor 25 mg  - Has additional Lopressor 25 mg daily as needed for palpitations  - Scheduled for A-fib ablation in 8/26/2024  - Patient interested in Watchman procedure, saw Dr. Villagomez 11/7/2024-> not recommending watchman due to lack of recurrent A-fib since her ablation, can reconsidered if she has recurrent A-fib-> had recurrent A-fib on 2/5/2025 on loop recorder, and scheduled for watchman on 3/20/2025  -Loop recorder 2/5/2025 showed A-fib episode lasting 2 hours 30 minutes A-fib burden 12.3%, repeat interrogation 2/10/2025 showed patient back in sinus rhythm  Hypertension  -Continue with nebivolol 5 mg daily  - Blood pressure is well-controlled today.  Obesity  - BMI of 35  Post ablative hypothyroidism    Follow-up in 6 months.    History of Present Illness:  Inés Duran is a 74 y.o. year old female with a past medical history of paroxysmal atrial fibrillation, hypertension and post ablative  hypothyroidism.    Patient has seen Dr. Villagomez and he recommended holding off on the Watchman procedure since she did not have any A-fib since her ablation with Dr. García.  Shortly after seeing Dr. Villagomez, she had a recurrent episode of atrial fibrillation on her loop recorder.  Due to this, she was scheduled for the Watchman procedure on 3/20.  I spoke to the patient today, she did not realize she was scheduled for the Watchman.  Reviewed the procedure with her and she is okay with continuing.  Will also have the scheduling nurse call her again to go through the instructions.      Patient Active Problem List   Diagnosis    PAF (paroxysmal atrial fibrillation) (HCC)    Benign essential HTN    Postablative hypothyroidism    Fall from slipping on ice    Closed fracture of medial malleolus of left tibia    Bimalleolar ankle fracture, left, closed, initial encounter    Peroneal tendonitis of left lower extremity    Obesity, morbid (HCC)    Abdominal wall hematoma    Chest wall hematoma    Acute pain due to trauma    Opacity of lung on imaging study    Closed nondisplaced fracture of distal pole of scaphoid bone of left wrist, initial encounter    History of GI bleed     Past Medical History:   Diagnosis Date    Disease of thyroid gland     Hypertension      Social History     Socioeconomic History    Marital status: /Civil Union     Spouse name: Not on file    Number of children: Not on file    Years of education: Not on file    Highest education level: Not on file   Occupational History    Not on file   Tobacco Use    Smoking status: Never     Passive exposure: Yes    Smokeless tobacco: Never   Vaping Use    Vaping status: Never Used   Substance and Sexual Activity    Alcohol use: No    Drug use: No    Sexual activity: Not Currently   Other Topics Concern    Not on file   Social History Narrative    Not on file     Social Drivers of Health     Financial Resource Strain: Not on file   Food Insecurity: No Food  Insecurity (2/16/2024)    Nursing - Inadequate Food Risk Classification     Worried About Running Out of Food in the Last Year: Never true     Ran Out of Food in the Last Year: Never true     Ran Out of Food in the Last Year: Not on file   Transportation Needs: No Transportation Needs (2/16/2024)    PRAPARE - Transportation     Lack of Transportation (Medical): No     Lack of Transportation (Non-Medical): No   Physical Activity: Not on file   Stress: Not on file   Social Connections: Not on file   Intimate Partner Violence: Not on file   Housing Stability: Unknown (2/16/2024)    Housing Stability Vital Sign     Unable to Pay for Housing in the Last Year: Patient declined     Number of Times Moved in the Last Year: 1     Homeless in the Last Year: No      Family History   Problem Relation Age of Onset    No Known Problems Mother     Lung cancer Father 67    No Known Problems Sister     No Known Problems Daughter     No Known Problems Maternal Grandmother     No Known Problems Maternal Grandfather     No Known Problems Paternal Grandmother     No Known Problems Paternal Grandfather     No Known Problems Maternal Aunt     No Known Problems Maternal Aunt     No Known Problems Maternal Aunt     No Known Problems Maternal Aunt     No Known Problems Paternal Aunt     No Known Problems Paternal Aunt     No Known Problems Paternal Aunt     Coronary artery disease Family     Colon cancer Cousin 80    Lung cancer Cousin 80     Past Surgical History:   Procedure Laterality Date    BREAST BIOPSY Right 1996    core bx  benign    BREAST BIOPSY Right 09/26/2024    1000 8cmfn    BREAST LUMPECTOMY Right 1990    benign    BUNIONECTOMY Left     CARDIAC ELECTROPHYSIOLOGY PROCEDURE N/A 10/18/2023    Procedure: Cardiac loop recorder implant;  Surgeon: Leonel Olivas MD;  Location: [facility];  Service: Cardiology    CARDIAC ELECTROPHYSIOLOGY PROCEDURE N/A 08/26/2024    Procedure: Cardiac eps/afib ablation PFA;  Surgeon: Jh  DO Raquel;  Location: [facility];  Service: Cardiology    HAND SURGERY Right     ganglion cyst removal    ORIF ANKLE FRACTURE Right 2005    ORIF TIBIA & FIBULA FRACTURES Left 02/13/2019    Procedure: OPEN REDUCTION W/ INTERNAL FIXATION (ORIF) ANKLE;  Surgeon: Marnie Silvestre DO;  Location: [facility];  Service: Orthopedics    THYROIDECTOMY      TONSILLECTOMY AND ADENOIDECTOMY      US GUIDED BREAST BIOPSY RIGHT COMPLETE Right 9/26/2024       Current Outpatient Medications:     Cholecalciferol (Vitamin D) 50 MCG (2000 UT) tablet, Take 2,000 Units by mouth daily, Disp: , Rfl:     flecainide (TAMBOCOR) 100 mg tablet, Take 1 tablet (100 mg total) by mouth as needed (Take 1 tablet as needed for recurrent atrial fibrillation), Disp: , Rfl:     levothyroxine 100 mcg tablet, Take 100 mcg by mouth daily, Disp: , Rfl: 2    metoprolol tartrate (LOPRESSOR) 25 mg tablet, Take 25 mg by mouth as needed Take two tablets as needed for A fib episodes, Disp: , Rfl:     Multiple Vitamins-Minerals (multivitamin with minerals) tablet, Take 1 tablet by mouth daily, Disp: , Rfl:     nebivolol (BYSTOLIC) 5 mg tablet, TAKE 1 TABLET BY MOUTH DAILY, Disp: 90 tablet, Rfl: 3    NON FORMULARY, Take 500 mg by mouth every other day D-Mannose, Disp: , Rfl:     omeprazole (PriLOSEC) 20 mg delayed release capsule, TAKE 1 CAPSULE(20 MG) BY MOUTH DAILY, Disp: 90 capsule, Rfl: 1    rivaroxaban (Xarelto) 20 mg tablet, Take 1 tablet (20 mg total) by mouth daily with breakfast, Disp: 30 tablet, Rfl: 3    saccharomyces boulardii (FLORASTOR) 250 mg capsule, Take by mouth probiotic, Disp: , Rfl:     Current Facility-Administered Medications:     ondansetron (ZOFRAN-ODT) dispersible tablet 4 mg, 4 mg, Oral, Q6H PRN, Johnny Glass PA-C  No Known Allergies      Labs:  Lab Results   Component Value Date    ALT 14 08/07/2024    ALT 14 08/07/2024    AST 12 08/07/2024    AST 12 08/07/2024    BUN 15 08/26/2024    CALCIUM 8.5 08/26/2024     08/26/2024  "   CO2 24 2024    CREATININE 0.65 2024    HDL 52 2024    HCT 42.9 2024    HGB 14.0 2024    MG 2.0 2015     2024    K 3.7 2024     2015    TRIG 96 2024    WBC 6.16 2024       Imaging: No results found.    EC2022 normal sinus rhythm, nonspecific T-wave changes    Review of Systems:  Review of Systems   Constitutional:  Negative for chills, diaphoresis, fatigue and fever.   HENT:  Negative for congestion.    Eyes:  Negative for photophobia and visual disturbance.   Respiratory:  Negative for chest tightness and shortness of breath.    Cardiovascular:  Positive for palpitations. Negative for chest pain and leg swelling.   Gastrointestinal:  Negative for abdominal distention, abdominal pain, diarrhea, nausea and vomiting.   Genitourinary:  Negative for difficulty urinating and dysuria.   Musculoskeletal:  Negative for arthralgias, gait problem and joint swelling.   Skin:  Negative for color change, pallor and rash.   Neurological:  Negative for dizziness, syncope and numbness.   Psychiatric/Behavioral:  Negative for agitation, behavioral problems and confusion.          Vitals:    25   BP: 124/72   Pulse: 64   SpO2: 97%          Vitals:    25   Weight: 97.3 kg (214 lb 9.6 oz)         Height: 5' 6\" (167.6 cm)     Physical Exam:  General appearance:  Appears stated age, alert, well appearing and in no distress  HEENT:  PERRLA, EOMI, no scleral icterus, no conjunctival pallor  NECK:  Supple, No elevated JVP, no thyromegaly, no carotid bruits  HEART:  Regular rate and rhythm, normal S1/S2, no S3/S4, no murmur or rub  LUNGS:  Clear to auscultation bilaterally  ABDOMEN:  Soft, non-tender, positive bowel sounds, no rebound or guarding, no organomegaly   EXTREMITIES:  No edema  VASCULAR:  Normal pedal pulses   SKIN: No lesions or rashes on exposed skin  NEURO:  CN II-XII intact, no focal deficits  "

## 2025-02-26 ENCOUNTER — TELEPHONE (OUTPATIENT)
Dept: CARDIAC SURGERY | Facility: CLINIC | Age: 75
End: 2025-02-26

## 2025-02-26 NOTE — TELEPHONE ENCOUNTER
Spoke with patient , she is not scheduled for Watchman at this time, she requested I discuss plan with Dr. Villagomez and get back to her.

## 2025-02-26 NOTE — TELEPHONE ENCOUNTER
Received return call from becky Navarro transferred call to Dilma at the CT surg office to assist further.

## 2025-02-27 ENCOUNTER — TELEPHONE (OUTPATIENT)
Dept: CARDIAC SURGERY | Facility: CLINIC | Age: 75
End: 2025-02-27

## 2025-02-27 NOTE — TELEPHONE ENCOUNTER
Patient states  called her and explained plan for Ablation/Watchman. She awaits call from schedulers with date of procedure.

## 2025-03-07 ENCOUNTER — HOSPITAL ENCOUNTER (OUTPATIENT)
Dept: CT IMAGING | Facility: HOSPITAL | Age: 75
Discharge: HOME | End: 2025-03-07
Payer: MEDICARE

## 2025-03-07 DIAGNOSIS — R91.1 PULMONARY NODULE: ICD-10-CM

## 2025-03-07 DIAGNOSIS — R91.8 OPACITY OF LUNG ON IMAGING STUDY: ICD-10-CM

## 2025-03-07 PROCEDURE — 71250 CT THORAX DX C-: CPT

## 2025-03-12 ENCOUNTER — PATIENT MESSAGE (OUTPATIENT)
Dept: CARDIOLOGY CLINIC | Facility: CLINIC | Age: 75
End: 2025-03-12

## 2025-03-12 ENCOUNTER — PREP FOR PROCEDURE (OUTPATIENT)
Dept: CARDIOLOGY CLINIC | Facility: CLINIC | Age: 75
End: 2025-03-12

## 2025-03-12 DIAGNOSIS — I48.0 PAF (PAROXYSMAL ATRIAL FIBRILLATION) (HCC): Primary | ICD-10-CM

## 2025-04-16 ENCOUNTER — TELEPHONE (OUTPATIENT)
Age: 75
End: 2025-04-16

## 2025-04-16 NOTE — TELEPHONE ENCOUNTER
Placed call to patient. She does not want to change her blood thinner at this time as she's note sure how much longer she'll need to be on them. She sees Dr. Villagomez on 5/21/25. She will  Xarelto 20mg samples from our office and go from there. She states she does not want to be on blood thinners in general.     Xarelto 20mg  LOT:[id number]  EXP: Jan 2027  Dispensed: 2 bottles

## 2025-04-16 NOTE — TELEPHONE ENCOUNTER
Received call from patient regarding her Xarelto prescription. She states when she tried to  the script, it was over $600 and she would like to discuss alternatives. She is also almost out of her blood thinner and needs an alternative as soon as possible. Please review and advise.

## 2025-04-16 NOTE — TELEPHONE ENCOUNTER
Pt called following up from this morning regarding needing an alternative to Xarelto.  She said Eliquis was recommended previously, but that also cost $600.  Pt did not take any Xarelto today.  Please advise.

## 2025-05-07 ENCOUNTER — TELEPHONE (OUTPATIENT)
Dept: CARDIOLOGY CLINIC | Facility: CLINIC | Age: 75
End: 2025-05-07

## 2025-05-13 ENCOUNTER — RESULTS FOLLOW-UP (OUTPATIENT)
Dept: CARDIOLOGY CLINIC | Facility: CLINIC | Age: 75
End: 2025-05-13

## 2025-05-13 ENCOUNTER — REMOTE DEVICE CLINIC VISIT (OUTPATIENT)
Dept: CARDIOLOGY CLINIC | Facility: CLINIC | Age: 75
End: 2025-05-13
Payer: MEDICARE

## 2025-05-13 DIAGNOSIS — I48.0 PAF (PAROXYSMAL ATRIAL FIBRILLATION) (HCC): Primary | ICD-10-CM

## 2025-05-13 PROCEDURE — 93298 REM INTERROG DEV EVAL SCRMS: CPT | Performed by: INTERNAL MEDICINE

## 2025-05-13 NOTE — PROGRESS NOTES
"MDT LOOP II/ACTIVE SYSTEM IS MRI CONDITIONAL   CARELINK TRANSMISSION: LOOP RECORDER. PRESENTING RHYTHM NSR @ 74 BPM. BATTERY STATUS \"OK.\" 2 DEVICE CLASSIFIED AF EPISODES PREVIOUSLY ADDRESSED IN ALERT. AF BURDEN = 0.2%. HX OF PAF. PT TAKES METOPROLOL TART, FLECAINIDE AND BYSTOLIC  ELIQUIS. ABLATION SCHEDULED FOR 7/23/25.  NO PATIENT OR NEW DEVICE ACTIVATED EPISODES. NORMAL DEVICE FUNCTION. DL   "

## 2025-05-21 ENCOUNTER — OFFICE VISIT (OUTPATIENT)
Dept: CARDIOLOGY CLINIC | Facility: CLINIC | Age: 75
End: 2025-05-21

## 2025-05-21 VITALS
WEIGHT: 214 LBS | HEIGHT: 66 IN | DIASTOLIC BLOOD PRESSURE: 80 MMHG | SYSTOLIC BLOOD PRESSURE: 132 MMHG | HEART RATE: 65 BPM | BODY MASS INDEX: 34.39 KG/M2

## 2025-05-21 DIAGNOSIS — I48.0 PAF (PAROXYSMAL ATRIAL FIBRILLATION) (HCC): Primary | ICD-10-CM

## 2025-05-21 LAB
ATRIAL RATE: 62 BPM
P AXIS: 28 DEGREES
PR INTERVAL: 142 MS
QRS AXIS: 61 DEGREES
QRSD INTERVAL: 88 MS
QT INTERVAL: 420 MS
QTC INTERVAL: 427 MS
T WAVE AXIS: -7 DEGREES
VENTRICULAR RATE: 62 BPM

## 2025-05-21 NOTE — PROGRESS NOTES
HEART AND VASCULAR  CARDIAC ELECTROPHYSIOLOGY   HEART RHYTHM CENTER  [facility]    Outpatient Follow-up  Today's Date: 05/21/25        Patient name: Inés Duran  YOB: 1950  Sex: female         Chief Complaint: referral for Watchman      ASSESSMENT:  Problem List Items Addressed This Visit          Cardiovascular and Mediastinum    PAF (paroxysmal atrial fibrillation) (HCC) - Primary    Relevant Orders    POCT ECG       73 yo female  1) Paroxysmal afib with loop in place, post PFA ablation Augu 2024 by Dr reyna and had 8 hour RVR recurrence Feb on loop. Saw me last year and I had said no Watchman then because of no afib but now has had recurrence. NO ETOH. . CHADS2Vasc=3 Hasbled 3.   2) H/o Gi bleed and wants off xarelto      PLAN:  Planning redo ablation and Watchman combined.  Risks and benefits discussed again reguarding afib ablation redo, will target any reconnections etc, may use PFA but also may use RF etc if needed.    Discussed risks and benefits of Watchman at length. We went over trial data, serious complications including bleeding around the heart, stroke, death, surgery. Explained it would lower stroke rate similar to if taking anticoagulation but ultimately will lower bleeding risk. I explained need for some form of anticoagulation for up to  first 6 months and risks of bleeding then, also risk of thrombus on device necessating need for blood thinners after procedure. We discussed need for repeat Transesophageal echocardiograms to monitor device and also pre procedure.       I did let her know it is reasonable to just wait and see since no afib since Feb but she really wants off blood thinners and also nervous of another afib w RVR episode.   Orders Placed This Encounter   Procedures    POCT ECG     There are no discontinued medications.      .............................................................................................    HPI/Subjective:   73 yo  female  1) Paroxysmal afib with loop in place, post PFA ablation Augu 2024 by Dr reyna and had 8 hour RVR recurrence Feb on loop. Saw me last year and I had said no Watchman then because of no afib but now has had recurrence. NO ETOH. . CHADS2Vasc=3 Hasbled 3.   2) H/o Gi bleed and wants off xarelto          Past Medical History:   Diagnosis Date    Disease of thyroid gland     Hypertension        No Known Allergies  I reviewed the Home Medication list and Allergies in the chart.   Scheduled Meds:  Current Outpatient Medications   Medication Sig Dispense Refill    Cholecalciferol (Vitamin D) 50 MCG (2000 UT) tablet Take 2,000 Units by mouth in the morning.      levothyroxine 100 mcg tablet Take 100 mcg by mouth in the morning.  2    Multiple Vitamins-Minerals (multivitamin with minerals) tablet Take 1 tablet by mouth in the morning.      nebivolol (BYSTOLIC) 5 mg tablet TAKE 1 TABLET BY MOUTH DAILY 90 tablet 3    omeprazole (PriLOSEC) 20 mg delayed release capsule TAKE 1 CAPSULE(20 MG) BY MOUTH DAILY 90 capsule 1    rivaroxaban (Xarelto) 20 mg tablet Take 1 tablet (20 mg total) by mouth daily with breakfast 30 tablet 3    saccharomyces boulardii (FLORASTOR) 250 mg capsule Take by mouth probiotic      flecainide (TAMBOCOR) 100 mg tablet Take 1 tablet (100 mg total) by mouth as needed (Take 1 tablet as needed for recurrent atrial fibrillation)      metoprolol tartrate (LOPRESSOR) 25 mg tablet Take 25 mg by mouth as needed Take two tablets as needed for A fib episodes      NON FORMULARY Take 500 mg by mouth every other day D-Mannose       Current Facility-Administered Medications   Medication Dose Route Frequency Provider Last Rate Last Admin    ondansetron (ZOFRAN-ODT) dispersible tablet 4 mg  4 mg Oral Q6H PRN Johnny Glass PA-C         PRN Meds:.  ondansetron        Family History   Problem Relation Age of Onset    No Known Problems Mother     Lung cancer Father 67    No Known Problems Sister     No Known Problems  Daughter     No Known Problems Maternal Grandmother     No Known Problems Maternal Grandfather     No Known Problems Paternal Grandmother     No Known Problems Paternal Grandfather     No Known Problems Maternal Aunt     No Known Problems Maternal Aunt     No Known Problems Maternal Aunt     No Known Problems Maternal Aunt     No Known Problems Paternal Aunt     No Known Problems Paternal Aunt     No Known Problems Paternal Aunt     Coronary artery disease Family     Colon cancer Cousin 80    Lung cancer Cousin 80       Social History     Socioeconomic History    Marital status: /Civil Union     Spouse name: Not on file    Number of children: Not on file    Years of education: Not on file    Highest education level: Not on file   Occupational History    Not on file   Tobacco Use    Smoking status: Never     Passive exposure: Yes    Smokeless tobacco: Never   Vaping Use    Vaping status: Never Used   Substance and Sexual Activity    Alcohol use: No    Drug use: No    Sexual activity: Not Currently   Other Topics Concern    Not on file   Social History Narrative    Not on file     Social Drivers of Health     Financial Resource Strain: Not on file   Food Insecurity: No Food Insecurity (2/16/2024)    Nursing - Inadequate Food Risk Classification     Worried About Running Out of Food in the Last Year: Never true     Ran Out of Food in the Last Year: Never true     Ran Out of Food in the Last Year: Not on file   Transportation Needs: No Transportation Needs (2/16/2024)    PRAPARE - Transportation     Lack of Transportation (Medical): No     Lack of Transportation (Non-Medical): No   Physical Activity: Not on file   Stress: Not on file   Social Connections: Not on file   Intimate Partner Violence: Not on file   Housing Stability: Unknown (2/16/2024)    Housing Stability Vital Sign     Unable to Pay for Housing in the Last Year: Patient declined     Number of Times Moved in the Last Year: 1     Homeless in the Last  "Year: No         OBJECTIVE:    /80 (BP Location: Right arm, Patient Position: Sitting, Cuff Size: Standard)   Pulse 65   Ht 5' 6\" (1.676 m)   Wt 97.1 kg (214 lb)   LMP  (LMP Unknown)   BMI 34.54 kg/m²   Vitals:    05/21/25 1352   Weight: 97.1 kg (214 lb)     GEN: No acute distress, Alert and oriented, well appearing  HEENT:External ears normal, oral pharynx clear, mucous membranes moist  EYES: Pupils equal, sclera anicteric, midline, normal conjuctiva  NECK: No JVD, supple, no obvious masses or thryomegaly or goiter  CARDIOVASCULAR:  RRR, No murmur, rub, gallops S1,S2  LUNGS: Clear To auscultation bilaterally, normal effort, no rales, rhonchi, crackles   ABDOMEN:  nondistended,  without obvious organomegaly or ascites  EXTREMITIES/VASCULAR:  No edema. warm an well perfused.  PSYCH: Normal Affect,  linear speech pattern without evidence of psychosis.   NEURO: Grossly intact, moving all extremiteis equal, face symmetric, alert and responsive, no obvious focal defecits   GAIT:  Ambulates normally without difficulty  HEME: No bleeding, bruising, petechia, purpura   SKIN: No significant rashes on visibile skin, warm, no diaphoresis or pallor.     Lab Results:       LABS:      Chemistry        Component Value Date/Time     02/09/2015 0245    K 3.7 08/26/2024 0758    K 4.2 08/07/2024 0924    K 4.2 08/07/2024 0924     08/26/2024 0758     08/07/2024 0924     08/07/2024 0924    CO2 24 08/26/2024 0758    CO2 26 08/07/2024 0924    CO2 26 08/07/2024 0924    BUN 15 08/26/2024 0758    BUN 13 08/07/2024 0924    BUN 13 08/07/2024 0924    CREATININE 0.65 08/26/2024 0758    CREATININE 0.61 08/07/2024 0924        Component Value Date/Time    CALCIUM 8.5 08/26/2024 0758    CALCIUM 8.5 08/07/2024 0924    CALCIUM 8.5 08/07/2024 0924    ALKPHOS 77 08/07/2024 0924    ALKPHOS 77 08/07/2024 0924    AST 12 08/07/2024 0924    AST 12 08/07/2024 0924    ALT 14 08/07/2024 0924    ALT 14 08/07/2024 0924    BILITOT " "0.3 02/08/2015 0130            No results found for: \"CHOL\"  Lab Results   Component Value Date    HDL 52 08/07/2024     Lab Results   Component Value Date    LDLCALC 107 08/07/2024     Lab Results   Component Value Date    TRIG 96 08/07/2024     No results found for: \"CHOLHDL\"    IMAGING: No results found.          I reviewed and interpreted the following LABS/EKG/TELE/IMAGING and below is summary of my interpretation (if data available):        Current EKG and Rhythm Strip:NSR 62bpm.      "

## 2025-06-23 DIAGNOSIS — I48.0 PAF (PAROXYSMAL ATRIAL FIBRILLATION) (HCC): ICD-10-CM

## 2025-06-23 NOTE — TELEPHONE ENCOUNTER
Pt is out of medication    Medication: xarelto    Dose/Frequency: 20 mg daily    Quantity: 30    [facility]    Office:   [] PCP/Provider -   [x] Speciality/Provider -     Does the patient have enough for 3 days?   [] Yes   [x] No - Send as HP to POD

## 2025-07-15 ENCOUNTER — APPOINTMENT (OUTPATIENT)
Dept: LAB | Facility: HOSPITAL | Age: 75
End: 2025-07-15
Payer: MEDICARE

## 2025-07-15 DIAGNOSIS — I48.0 PAF (PAROXYSMAL ATRIAL FIBRILLATION) (HCC): ICD-10-CM

## 2025-07-15 LAB
ALBUMIN SERPL BCG-MCNC: 4.2 G/DL (ref 3.5–5)
ALP SERPL-CCNC: 71 U/L (ref 34–104)
ALT SERPL W P-5'-P-CCNC: 10 U/L (ref 7–52)
ANION GAP SERPL CALCULATED.3IONS-SCNC: 4 MMOL/L (ref 4–13)
AST SERPL W P-5'-P-CCNC: 13 U/L (ref 13–39)
BASOPHILS # BLD AUTO: 0.03 THOUSANDS/ÂΜL (ref 0–0.1)
BASOPHILS NFR BLD AUTO: 1 % (ref 0–1)
BILIRUB SERPL-MCNC: 0.7 MG/DL (ref 0.2–1)
BUN SERPL-MCNC: 15 MG/DL (ref 5–25)
CALCIUM SERPL-MCNC: 8.5 MG/DL (ref 8.4–10.2)
CHLORIDE SERPL-SCNC: 107 MMOL/L (ref 96–108)
CO2 SERPL-SCNC: 29 MMOL/L (ref 21–32)
CREAT SERPL-MCNC: 0.63 MG/DL (ref 0.6–1.3)
EOSINOPHIL # BLD AUTO: 0.13 THOUSAND/ÂΜL (ref 0–0.61)
EOSINOPHIL NFR BLD AUTO: 2 % (ref 0–6)
ERYTHROCYTE [DISTWIDTH] IN BLOOD BY AUTOMATED COUNT: 13.2 % (ref 11.6–15.1)
GFR SERPL CREATININE-BSD FRML MDRD: 89 ML/MIN/1.73SQ M
GLUCOSE P FAST SERPL-MCNC: 102 MG/DL (ref 65–99)
HCT VFR BLD AUTO: 43.3 % (ref 34.8–46.1)
HGB BLD-MCNC: 14.3 G/DL (ref 11.5–15.4)
IMM GRANULOCYTES # BLD AUTO: 0.02 THOUSAND/UL (ref 0–0.2)
IMM GRANULOCYTES NFR BLD AUTO: 0 % (ref 0–2)
LYMPHOCYTES # BLD AUTO: 2.06 THOUSANDS/ÂΜL (ref 0.6–4.47)
LYMPHOCYTES NFR BLD AUTO: 35 % (ref 14–44)
MCH RBC QN AUTO: 29.5 PG (ref 26.8–34.3)
MCHC RBC AUTO-ENTMCNC: 33 G/DL (ref 31.4–37.4)
MCV RBC AUTO: 89 FL (ref 82–98)
MONOCYTES # BLD AUTO: 0.47 THOUSAND/ÂΜL (ref 0.17–1.22)
MONOCYTES NFR BLD AUTO: 8 % (ref 4–12)
NEUTROPHILS # BLD AUTO: 3.12 THOUSANDS/ÂΜL (ref 1.85–7.62)
NEUTS SEG NFR BLD AUTO: 54 % (ref 43–75)
NRBC BLD AUTO-RTO: 0 /100 WBCS
PLATELET # BLD AUTO: 257 THOUSANDS/UL (ref 149–390)
PMV BLD AUTO: 9.8 FL (ref 8.9–12.7)
POTASSIUM SERPL-SCNC: 4 MMOL/L (ref 3.5–5.3)
PROT SERPL-MCNC: 6.8 G/DL (ref 6.4–8.4)
RBC # BLD AUTO: 4.85 MILLION/UL (ref 3.81–5.12)
SODIUM SERPL-SCNC: 140 MMOL/L (ref 135–147)
WBC # BLD AUTO: 5.83 THOUSAND/UL (ref 4.31–10.16)

## 2025-07-15 PROCEDURE — 85025 COMPLETE CBC W/AUTO DIFF WBC: CPT

## 2025-07-15 PROCEDURE — 36415 COLL VENOUS BLD VENIPUNCTURE: CPT

## 2025-07-15 PROCEDURE — 80053 COMPREHEN METABOLIC PANEL: CPT

## 2025-07-23 ENCOUNTER — HOSPITAL ENCOUNTER (OUTPATIENT)
Dept: NON INVASIVE DIAGNOSTICS | Facility: HOSPITAL | Age: 75
Discharge: HOME | DRG: 317 | End: 2025-07-23
Payer: MEDICARE

## 2025-07-23 ENCOUNTER — ANESTHESIA (OUTPATIENT)
Dept: NON INVASIVE DIAGNOSTICS | Facility: HOSPITAL | Age: 75
DRG: 317 | End: 2025-07-23
Payer: MEDICARE

## 2025-07-23 ENCOUNTER — HOSPITAL ENCOUNTER (INPATIENT)
Facility: HOSPITAL | Age: 75
LOS: 1 days | Discharge: HOME | DRG: 317 | End: 2025-07-24
Attending: INTERNAL MEDICINE | Admitting: INTERNAL MEDICINE
Payer: MEDICARE

## 2025-07-23 DIAGNOSIS — E89.0 POSTABLATIVE HYPOTHYROIDISM: ICD-10-CM

## 2025-07-23 DIAGNOSIS — I10 BENIGN ESSENTIAL HTN: Primary | ICD-10-CM

## 2025-07-23 DIAGNOSIS — I48.0 PAF (PAROXYSMAL ATRIAL FIBRILLATION) (HCC): ICD-10-CM

## 2025-07-23 DIAGNOSIS — I48.91 ATRIAL FIBRILLATION (HCC): ICD-10-CM

## 2025-07-23 PROBLEM — Z95.818 IMPLANTABLE LOOP RECORDER PRESENT: Status: ACTIVE | Noted: 2025-07-23

## 2025-07-23 LAB
ANION GAP SERPL CALCULATED.3IONS-SCNC: 7 MMOL/L (ref 4–13)
BUN SERPL-MCNC: 16 MG/DL (ref 5–25)
CALCIUM SERPL-MCNC: 8.8 MG/DL (ref 8.4–10.2)
CHLORIDE SERPL-SCNC: 105 MMOL/L (ref 96–108)
CO2 SERPL-SCNC: 28 MMOL/L (ref 21–32)
CREAT SERPL-MCNC: 0.59 MG/DL (ref 0.6–1.3)
ERYTHROCYTE [DISTWIDTH] IN BLOOD BY AUTOMATED COUNT: 13.1 % (ref 11.6–15.1)
GFR SERPL CREATININE-BSD FRML MDRD: 91 ML/MIN/1.73SQ M
GLUCOSE P FAST SERPL-MCNC: 100 MG/DL (ref 65–99)
GLUCOSE SERPL-MCNC: 100 MG/DL (ref 65–140)
HCT VFR BLD AUTO: 43.6 % (ref 34.8–46.1)
HGB BLD-MCNC: 14.3 G/DL (ref 11.5–15.4)
INR PPP: 1.84 (ref 0.85–1.19)
KCT BLD-ACNC: 299 SEC (ref 89–137)
KCT BLD-ACNC: 318 SEC (ref 89–137)
KCT BLD-ACNC: 383 SEC (ref 89–137)
KCT BLD-ACNC: 408 SEC (ref 89–137)
MCH RBC QN AUTO: 29.6 PG (ref 26.8–34.3)
MCHC RBC AUTO-ENTMCNC: 32.8 G/DL (ref 31.4–37.4)
MCV RBC AUTO: 90 FL (ref 82–98)
PLATELET # BLD AUTO: 264 THOUSANDS/UL (ref 149–390)
PMV BLD AUTO: 10 FL (ref 8.9–12.7)
POTASSIUM SERPL-SCNC: 3.6 MMOL/L (ref 3.5–5.3)
PROTHROMBIN TIME: 21.3 SECONDS (ref 12.3–15)
QRS AXIS: 7 DEGREES
QRSD INTERVAL: 86 MS
QT INTERVAL: 410 MS
QTC INTERVAL: 433 MS
RBC # BLD AUTO: 4.83 MILLION/UL (ref 3.81–5.12)
SODIUM SERPL-SCNC: 140 MMOL/L (ref 135–147)
SPECIMEN SOURCE: ABNORMAL
T WAVE AXIS: 1 DEGREES
VENTRICULAR RATE: 67 BPM
WBC # BLD AUTO: 6.52 THOUSAND/UL (ref 4.31–10.16)

## 2025-07-23 PROCEDURE — 93656 COMPRE EP EVAL ABLTJ ATR FIB: CPT | Performed by: INTERNAL MEDICINE

## 2025-07-23 PROCEDURE — 85027 COMPLETE CBC AUTOMATED: CPT | Performed by: PHYSICIAN ASSISTANT

## 2025-07-23 PROCEDURE — B24BZZ4 ULTRASONOGRAPHY OF HEART WITH AORTA, TRANSESOPHAGEAL: ICD-10-PCS | Performed by: STUDENT IN AN ORGANIZED HEALTH CARE EDUCATION/TRAINING PROGRAM

## 2025-07-23 PROCEDURE — 02L73DK OCCLUSION OF LEFT ATRIAL APPENDAGE WITH INTRALUMINAL DEVICE, PERCUTANEOUS APPROACH: ICD-10-PCS | Performed by: INTERNAL MEDICINE

## 2025-07-23 PROCEDURE — C1766 INTRO/SHEATH,STRBLE,NON-PEEL: HCPCS | Performed by: INTERNAL MEDICINE

## 2025-07-23 PROCEDURE — 76937 US GUIDE VASCULAR ACCESS: CPT | Performed by: INTERNAL MEDICINE

## 2025-07-23 PROCEDURE — C1887 CATHETER, GUIDING: HCPCS | Performed by: INTERNAL MEDICINE

## 2025-07-23 PROCEDURE — 85610 PROTHROMBIN TIME: CPT | Performed by: PHYSICIAN ASSISTANT

## 2025-07-23 PROCEDURE — 93657 TX L/R ATRIAL FIB ADDL: CPT | Performed by: INTERNAL MEDICINE

## 2025-07-23 PROCEDURE — 93655 ICAR CATH ABLTJ DSCRT ARRHYT: CPT | Performed by: INTERNAL MEDICINE

## 2025-07-23 PROCEDURE — 33340 PERQ CLSR TCAT L ATR APNDGE: CPT | Performed by: INTERNAL MEDICINE

## 2025-07-23 PROCEDURE — C1760 CLOSURE DEV, VASC: HCPCS | Performed by: INTERNAL MEDICINE

## 2025-07-23 PROCEDURE — C1759 CATH, INTRA ECHOCARDIOGRAPHY: HCPCS | Performed by: INTERNAL MEDICINE

## 2025-07-23 PROCEDURE — C1894 INTRO/SHEATH, NON-LASER: HCPCS | Performed by: INTERNAL MEDICINE

## 2025-07-23 PROCEDURE — C1733 CATH, EP, OTHR THAN COOL-TIP: HCPCS | Performed by: INTERNAL MEDICINE

## 2025-07-23 PROCEDURE — C1732 CATH, EP, DIAG/ABL, 3D/VECT: HCPCS | Performed by: INTERNAL MEDICINE

## 2025-07-23 PROCEDURE — 80048 BASIC METABOLIC PNL TOTAL CA: CPT | Performed by: PHYSICIAN ASSISTANT

## 2025-07-23 PROCEDURE — C1769 GUIDE WIRE: HCPCS | Performed by: INTERNAL MEDICINE

## 2025-07-23 PROCEDURE — 93355 ECHO TRANSESOPHAGEAL (TEE): CPT

## 2025-07-23 PROCEDURE — 93005 ELECTROCARDIOGRAM TRACING: CPT

## 2025-07-23 PROCEDURE — NC001 PR NO CHARGE: Performed by: PHYSICIAN ASSISTANT

## 2025-07-23 PROCEDURE — 02583ZF DESTRUCTION OF CONDUCTION MECHANISM USING IRREVERSIBLE ELECTROPORATION, PERCUTANEOUS APPROACH: ICD-10-PCS | Performed by: INTERNAL MEDICINE

## 2025-07-23 PROCEDURE — 02K83ZZ MAP CONDUCTION MECHANISM, PERCUTANEOUS APPROACH: ICD-10-PCS | Performed by: INTERNAL MEDICINE

## 2025-07-23 PROCEDURE — 85347 COAGULATION TIME ACTIVATED: CPT

## 2025-07-23 PROCEDURE — 76376 3D RENDER W/INTRP POSTPROCES: CPT

## 2025-07-23 PROCEDURE — 93010 ELECTROCARDIOGRAM REPORT: CPT | Performed by: INTERNAL MEDICINE

## 2025-07-23 PROCEDURE — 93623 PRGRMD STIMJ&PACG IV RX NFS: CPT | Performed by: INTERNAL MEDICINE

## 2025-07-23 PROCEDURE — C1730 CATH, EP, 19 OR FEW ELECT: HCPCS | Performed by: INTERNAL MEDICINE

## 2025-07-23 DEVICE — IMPLANTABLE DEVICE: Type: IMPLANTABLE DEVICE | Site: GROIN | Status: FUNCTIONAL

## 2025-07-23 DEVICE — IMPLANTABLE DEVICE: Type: IMPLANTABLE DEVICE | Site: HEART | Status: FUNCTIONAL

## 2025-07-23 RX ORDER — NITROGLYCERIN 20 MG/100ML
INJECTION INTRAVENOUS CODE/TRAUMA/SEDATION MEDICATION
Status: DISCONTINUED | OUTPATIENT
Start: 2025-07-23 | End: 2025-07-23 | Stop reason: HOSPADM

## 2025-07-23 RX ORDER — GLYCOPYRROLATE 0.2 MG/ML
INJECTION INTRAMUSCULAR; INTRAVENOUS AS NEEDED
Status: DISCONTINUED | OUTPATIENT
Start: 2025-07-23 | End: 2025-07-23

## 2025-07-23 RX ORDER — FENTANYL CITRATE 50 UG/ML
INJECTION, SOLUTION INTRAMUSCULAR; INTRAVENOUS AS NEEDED
Status: DISCONTINUED | OUTPATIENT
Start: 2025-07-23 | End: 2025-07-23

## 2025-07-23 RX ORDER — ACETAMINOPHEN 325 MG/1
975 TABLET ORAL EVERY 6 HOURS PRN
Status: DISCONTINUED | OUTPATIENT
Start: 2025-07-23 | End: 2025-07-24 | Stop reason: HOSPADM

## 2025-07-23 RX ORDER — HYDROMORPHONE HCL IN WATER/PF 6 MG/30 ML
0.2 PATIENT CONTROLLED ANALGESIA SYRINGE INTRAVENOUS
Status: DISCONTINUED | OUTPATIENT
Start: 2025-07-23 | End: 2025-07-23 | Stop reason: HOSPADM

## 2025-07-23 RX ORDER — ASPIRIN 81 MG/1
81 TABLET ORAL DAILY
Status: DISCONTINUED | OUTPATIENT
Start: 2025-07-24 | End: 2025-07-24 | Stop reason: HOSPADM

## 2025-07-23 RX ORDER — PANTOPRAZOLE SODIUM 40 MG/1
40 TABLET, DELAYED RELEASE ORAL
Status: DISCONTINUED | OUTPATIENT
Start: 2025-07-24 | End: 2025-07-24 | Stop reason: HOSPADM

## 2025-07-23 RX ORDER — FENTANYL CITRATE/PF 50 MCG/ML
25 SYRINGE (ML) INJECTION
Status: DISCONTINUED | OUTPATIENT
Start: 2025-07-23 | End: 2025-07-23 | Stop reason: HOSPADM

## 2025-07-23 RX ORDER — HEPARIN SODIUM 1000 [USP'U]/ML
INJECTION, SOLUTION INTRAVENOUS; SUBCUTANEOUS CODE/TRAUMA/SEDATION MEDICATION
Status: DISCONTINUED | OUTPATIENT
Start: 2025-07-23 | End: 2025-07-23 | Stop reason: HOSPADM

## 2025-07-23 RX ORDER — ONDANSETRON 2 MG/ML
4 INJECTION INTRAMUSCULAR; INTRAVENOUS ONCE AS NEEDED
Status: DISCONTINUED | OUTPATIENT
Start: 2025-07-23 | End: 2025-07-23 | Stop reason: HOSPADM

## 2025-07-23 RX ORDER — CEFAZOLIN SODIUM 2 G/50ML
2000 SOLUTION INTRAVENOUS ONCE
Status: COMPLETED | OUTPATIENT
Start: 2025-07-23 | End: 2025-07-23

## 2025-07-23 RX ORDER — SACCHAROMYCES BOULARDII 250 MG
250 CAPSULE ORAL 2 TIMES DAILY
Status: DISCONTINUED | OUTPATIENT
Start: 2025-07-23 | End: 2025-07-24 | Stop reason: HOSPADM

## 2025-07-23 RX ORDER — ROCURONIUM BROMIDE 10 MG/ML
INJECTION, SOLUTION INTRAVENOUS AS NEEDED
Status: DISCONTINUED | OUTPATIENT
Start: 2025-07-23 | End: 2025-07-23

## 2025-07-23 RX ORDER — PROTAMINE SULFATE 10 MG/ML
INJECTION, SOLUTION INTRAVENOUS AS NEEDED
Status: DISCONTINUED | OUTPATIENT
Start: 2025-07-23 | End: 2025-07-23

## 2025-07-23 RX ORDER — PROPOFOL 10 MG/ML
INJECTION, EMULSION INTRAVENOUS AS NEEDED
Status: DISCONTINUED | OUTPATIENT
Start: 2025-07-23 | End: 2025-07-23

## 2025-07-23 RX ORDER — NEBIVOLOL 5 MG/1
5 TABLET ORAL DAILY
Status: DISCONTINUED | OUTPATIENT
Start: 2025-07-24 | End: 2025-07-24 | Stop reason: HOSPADM

## 2025-07-23 RX ORDER — DEXAMETHASONE SODIUM PHOSPHATE 10 MG/ML
INJECTION, SOLUTION INTRAMUSCULAR; INTRAVENOUS AS NEEDED
Status: DISCONTINUED | OUTPATIENT
Start: 2025-07-23 | End: 2025-07-23

## 2025-07-23 RX ORDER — ONDANSETRON 2 MG/ML
INJECTION INTRAMUSCULAR; INTRAVENOUS AS NEEDED
Status: DISCONTINUED | OUTPATIENT
Start: 2025-07-23 | End: 2025-07-23

## 2025-07-23 RX ORDER — LEVOTHYROXINE SODIUM 100 UG/1
100 TABLET ORAL DAILY
Status: DISCONTINUED | OUTPATIENT
Start: 2025-07-24 | End: 2025-07-24 | Stop reason: HOSPADM

## 2025-07-23 RX ORDER — LIDOCAINE HYDROCHLORIDE 10 MG/ML
INJECTION, SOLUTION EPIDURAL; INFILTRATION; INTRACAUDAL; PERINEURAL AS NEEDED
Status: DISCONTINUED | OUTPATIENT
Start: 2025-07-23 | End: 2025-07-23

## 2025-07-23 RX ORDER — MIDAZOLAM HYDROCHLORIDE 2 MG/2ML
INJECTION, SOLUTION INTRAMUSCULAR; INTRAVENOUS AS NEEDED
Status: DISCONTINUED | OUTPATIENT
Start: 2025-07-23 | End: 2025-07-23

## 2025-07-23 RX ORDER — SODIUM CHLORIDE 9 MG/ML
20 INJECTION, SOLUTION INTRAVENOUS ONCE
Status: DISCONTINUED | OUTPATIENT
Start: 2025-07-23 | End: 2025-07-23 | Stop reason: HOSPADM

## 2025-07-23 RX ORDER — DOCUSATE SODIUM 100 MG/1
100 CAPSULE, LIQUID FILLED ORAL 2 TIMES DAILY PRN
Status: DISCONTINUED | OUTPATIENT
Start: 2025-07-23 | End: 2025-07-24 | Stop reason: HOSPADM

## 2025-07-23 RX ORDER — HEPARIN SODIUM 10000 [USP'U]/100ML
INJECTION, SOLUTION INTRAVENOUS
Status: DISCONTINUED | OUTPATIENT
Start: 2025-07-23 | End: 2025-07-23 | Stop reason: HOSPADM

## 2025-07-23 RX ORDER — SODIUM CHLORIDE 9 MG/ML
20 INJECTION, SOLUTION INTRAVENOUS CONTINUOUS
Status: DISCONTINUED | OUTPATIENT
Start: 2025-07-23 | End: 2025-07-24 | Stop reason: HOSPADM

## 2025-07-23 RX ADMIN — PROPOFOL 120 MG: 10 INJECTION, EMULSION INTRAVENOUS at 15:11

## 2025-07-23 RX ADMIN — GLYCOPYRROLATE 0.2 MG: 0.2 INJECTION, SOLUTION INTRAMUSCULAR; INTRAVENOUS at 15:20

## 2025-07-23 RX ADMIN — MIDAZOLAM 2 MG: 1 INJECTION INTRAMUSCULAR; INTRAVENOUS at 15:00

## 2025-07-23 RX ADMIN — PHENYLEPHRINE HYDROCHLORIDE 200 MCG: 50 INJECTION INTRAVENOUS at 16:35

## 2025-07-23 RX ADMIN — Medication 250 MG: at 18:51

## 2025-07-23 RX ADMIN — PHENYLEPHRINE HYDROCHLORIDE 200 MCG: 50 INJECTION INTRAVENOUS at 16:10

## 2025-07-23 RX ADMIN — ROCURONIUM BROMIDE 50 MG: 10 INJECTION, SOLUTION INTRAVENOUS at 15:11

## 2025-07-23 RX ADMIN — SODIUM CHLORIDE 20 ML/HR: 0.9 INJECTION, SOLUTION INTRAVENOUS at 11:55

## 2025-07-23 RX ADMIN — FENTANYL CITRATE 50 MCG: 50 INJECTION INTRAMUSCULAR; INTRAVENOUS at 16:16

## 2025-07-23 RX ADMIN — PHENYLEPHRINE HYDROCHLORIDE 200 MCG: 50 INJECTION INTRAVENOUS at 16:07

## 2025-07-23 RX ADMIN — ROCURONIUM BROMIDE 30 MG: 10 INJECTION, SOLUTION INTRAVENOUS at 16:15

## 2025-07-23 RX ADMIN — PHENYLEPHRINE HYDROCHLORIDE 200 MCG: 50 INJECTION INTRAVENOUS at 16:22

## 2025-07-23 RX ADMIN — FENTANYL CITRATE 50 MCG: 50 INJECTION INTRAMUSCULAR; INTRAVENOUS at 15:11

## 2025-07-23 RX ADMIN — LIDOCAINE HYDROCHLORIDE 50 MG: 10 INJECTION, SOLUTION EPIDURAL; INFILTRATION; INTRACAUDAL; PERINEURAL at 15:11

## 2025-07-23 RX ADMIN — PROTAMINE SULFATE 60 MG: 10 INJECTION, SOLUTION INTRAVENOUS at 17:04

## 2025-07-23 RX ADMIN — CEFAZOLIN SODIUM 2000 MG: 2 SOLUTION INTRAVENOUS at 15:14

## 2025-07-23 RX ADMIN — PROPOFOL 50 MG: 10 INJECTION, EMULSION INTRAVENOUS at 17:16

## 2025-07-23 RX ADMIN — DEXAMETHASONE SODIUM PHOSPHATE 10 MG: 10 INJECTION, SOLUTION INTRAMUSCULAR; INTRAVENOUS at 15:14

## 2025-07-23 RX ADMIN — PHENYLEPHRINE HYDROCHLORIDE 200 MCG: 50 INJECTION INTRAVENOUS at 16:06

## 2025-07-23 RX ADMIN — PHENYLEPHRINE HYDROCHLORIDE 100 MCG: 50 INJECTION INTRAVENOUS at 15:28

## 2025-07-23 RX ADMIN — SODIUM CHLORIDE: 0.9 INJECTION, SOLUTION INTRAVENOUS at 15:35

## 2025-07-23 RX ADMIN — SUGAMMADEX 300 MG: 100 INJECTION, SOLUTION INTRAVENOUS at 17:09

## 2025-07-23 RX ADMIN — ONDANSETRON 4 MG: 2 INJECTION INTRAMUSCULAR; INTRAVENOUS at 15:14

## 2025-07-23 NOTE — ASSESSMENT & PLAN NOTE
Had thyroid dysfunction that was felt to have contributed to arrhythmias  9/2022 - initial Zio showed only PAC's but was recommended to undergo loop recorder implantation for further monitoring  10/2023 - loop implanted   Atrial fibrillation was picked up  Started on flecainide and metoprolol  Seen by DT EP with breakthrough episodes on flec - ablation recommended  8/2024 - underwent PVI with PFA by Dr. García  11/2024 - seen by EP SS for watchman consideration  Was not felt to be candidate as not significant enough burden  Recently more recurrence - set up for repeat afib/Watchman    - anticoagulated with Xarelto / [id number] score of 3 (age, sex, HTN)  - EF of 65% per echo 9/2023 / no dilation of left atrium noted  - rate control: nebivolol / metoprolol in the past  - antiarrhythmic therapy: none / flecainide in the past  - prior cardioversion:  - prior ablation: prior pulmonary vein isolation with PFA by Dr. García on 8/26/2024    Patient presents today to undergo repeat ablation of atrial fibrillation and Watchman implantation.

## 2025-07-23 NOTE — DISCHARGE INSTR - AVS FIRST PAGE
Watchman Appendage Closure Device Instructions:  MEDICATION INSTRUCTIONS/CHANGES:  Blood thinner - Xarelto    After Watchman has been placed, you will continue on both aspirin 81mg daily and Xarelto until ITZEL has been done at which point, if Watchman is healing well, will either be continued for on this regimen OR transition to aspirin and Plavix for 6 months (see below under follow up). You will be advised by your physician on this.    RESTRICTIONS:   No heavy lifting (more than 5-10 pounds) or strenuous activity for one week.    No soaking in a bath tub/hot tub/swimming pool for one week or until groin heals. You may shower - please let soap and water run over the groins, no scrubbing, and pat the area dry. Please place band-aid on groins daily for up to five days, but you may remove sooner if no issues are noted.     If you notice ongoing bleeding, swelling, or firm lumps in groin near ablation incision, please contact Dr. Villagomez' office - [phone]. If you have any significant issues after hours or on the weekends, please call the on call cardiology number at [phone].    FOLLOW UP:  45 day ITZEL has been scheduled for: 9/4/2025 - 10:00AM.  Please call Dr. Villagomez' office at [phone] after your ITZEL has been completed to discuss anticoagulation/aspirin recommendations.  Please do not stop any blood thinner until you speak with Dr. Villagomez or his designee.

## 2025-07-23 NOTE — ANESTHESIA POSTPROCEDURE EVALUATION
Post-Op Assessment Note    CV Status:  Stable  Pain Score: 0    Pain management: adequate       Mental Status:  Alert and awake   Hydration Status:  Euvolemic   PONV Controlled:  Controlled   Airway Patency:  Patent     Post Op Vitals Reviewed: Yes    No anethesia notable event occurred.    Staff: CRNA, Anesthesiologist           Last Filed PACU Vitals:  Vitals Value Taken Time   Temp 97    Pulse 72 07/23/25 17:32   /57    Resp 16    SpO2 94 % 07/23/25 17:32   Vitals shown include unfiled device data.

## 2025-07-23 NOTE — ANESTHESIA PROCEDURE NOTES
Procedure Performed: ITZEL Anesthesia  Start Time:  7/23/2025 3:30 PM        Preanesthesia Checklist    Patient identified, IV assessed, risks and benefits discussed, monitors and equipment assessed, procedure being performed at surgeon's request and anesthesia consent obtained.      Procedure     Type of ITZEL: complete ITZEL with interpretation. Images Saved: ultrasound permanent image saved. Physician Requesting Echo: Charles Villagomez MD.  Location performed: OR. Intubated.  Heart visualized. Insertion of ITZEL Probe:  Atraumatic.  Modalities:  2D only, color flow mapping, continuous wave Doppler and pulse wave Doppler.      Echocardiographic and Doppler Measurements    PREPROCEDURE    LEFT VENTRICLE:  Systolic Function: normal. Ejection Fraction: 60%. Cavity size: normal.       RIGHT VENTRICLE:  Systolic Function: normal.                AORTIC VALVE:  Leaflets: normal and trileaflet. Leaflet motions normal and normal. Stenosis: none.     Regurgitation: none.      MITRAL VALVE:  Leaflets: normal. Leaflet Motions: normal.    Stenosis: none.       TRICUSPID VALVE:  Leaflets: normal. Leaflet Motions: normal.        PULMONIC VALVE:  Leaflets: normal.          ASCENDING AORTA:  Size:  normal.  Dissection not present.      AORTIC ARCH:  Size:  normal.  dissection not present. Grade 2: severe intimal thickening without protruding atheroma.    DESCENDING AORTA:  Size: normal.  Dissection not present. Grade 2: severe intimal thickening without protruding atheroma.        RIGHT ATRIUM:  Size:  normal. No spontaneous echo contrast.    LEFT ATRIUM:  Size: normal. No spontaneous echo contrast.    LEFT ATRIAL APPENDAGE:  Size: normal. No spontaneous echo contrast         ATRIAL SEPTUM:  Intra-atrial septal morphology: patent foramen ovale.          VENTRICULAR SEPTUM:  Intra-ventricular septum morphology: normal.             OTHER FINDINGS:  Pericardium:  normal. Pleural Effusion:   none.        POSTPROCEDURE                    REMOVAL:  Probe Removal: atraumatic.      ECHOCARDIOGRAM COMMENTS:  Watchman device in place with appropriate compression and no leak. Residual PFO and ASF from tansseptal puncture with L->R flow..

## 2025-07-23 NOTE — H&P
H&P Exam - Electrophysiology  Inés Duran 74 y.o. female MRN: [id number]  Unit/Bed#: [facility] RO[facility] Encounter: [id number]    Assessment & Plan     Assessment & Plan  PAF (paroxysmal atrial fibrillation) (HCC)  Had thyroid dysfunction that was felt to have contributed to arrhythmias  9/2022 - initial Zio showed only PAC's but was recommended to undergo loop recorder implantation for further monitoring  10/2023 - loop implanted   Atrial fibrillation was picked up  Started on flecainide and metoprolol  Seen by DT EP with breakthrough episodes on flec - ablation recommended  8/2024 - underwent PVI with PFA by Dr. García  11/2024 - seen by EP SS for watchman consideration  Was not felt to be candidate as not significant enough burden  Recently more recurrence - set up for repeat afib/Watchman    - anticoagulated with Xarelto / [id number] score of 3 (age, sex, HTN)  - EF of 65% per echo 9/2023 / no dilation of left atrium noted  - rate control: nebivolol / metoprolol in the past  - antiarrhythmic therapy: none / flecainide in the past  - prior cardioversion:  - prior ablation: prior pulmonary vein isolation with PFA by Dr. García on 8/26/2024    Patient presents today to undergo repeat ablation of atrial fibrillation and Watchman implantation.   Medtronic loop recorder present  - implanted 10/2023 for atrial fibrillation surveillance  Benign essential HTN  - maintained on nebivolol  Postablative hypothyroidism    History of GI bleed  - reasoning for Watchman    History of Present Illness   HPI:  Inés Duran is a 74 y.o. year old female with paroxysmal versus persistent atrial fibrillation currently anticoagulated with Xarelto, Medtronic loop recorder in situ, hypertension, post ablative hypothyroidism, and history of GI bleed.    Patient has followed with electrophysiology team for at least a couple years at this point.  It appears that she had thyroid dysfunction which may have felt to been contributed to her  arrhythmias.  Early as 2022, she did have a ZIO monitor that did not  atrial fibrillation but eventually underwent loop recorder implantation in October 2023 for atrial fibrillation surveillance.  Atrial fibrillation was picked up and adverse medications were attempted but patient continued with breakthrough despite flecainide therapy.  She presented and underwent ablation of atrial fibrillation with pulmonary vein isolation by Dr. Kaminski 8/26/2024.  She tolerated the procedure well and was sent home the same day.  In the outpatient setting, she was also seen by Dr. Villagomez of electrophysiology to discuss watchman.  However, given low burden overall this was deferred.  More recently, she was noted to have episodes of atrial fibrillation and when seen again, it was recommended that she undergo repeat ablation and Watchman procedure at the same time for which she presents today.    She does feel her atrial fibrillation when she gets nervous.  She was surprised to hear that she is in atrial fibrillation today.    EKG        Review of Systems  ROS as noted above, otherwise 12 point review of systems was performed and is negative.     Historical Information   Past Medical History[1]  Past Surgical History[2]  Family History: Family History[3]    Social History   Social History     Substance and Sexual Activity   Alcohol Use No     Social History     Substance and Sexual Activity   Drug Use No     Tobacco Use History[4]    Meds/Allergies   all medications and allergies reviewed  Home Medications:   Medications Prior to Admission:     Cholecalciferol (Vitamin D) 50 MCG (2000 UT) tablet    levothyroxine 100 mcg tablet    Multiple Vitamins-Minerals (multivitamin with minerals) tablet    nebivolol (BYSTOLIC) 5 mg tablet    omeprazole (PriLOSEC) 20 mg delayed release capsule    rivaroxaban (Xarelto) 20 mg tablet    saccharomyces boulardii (FLORASTOR) 250 mg capsule    flecainide (TAMBOCOR) 100 mg  "tablet    Allergies[5]    Objective   Vitals: Blood pressure 167/82, pulse 67, temperature (!) 97.3 °F (36.3 °C), temperature source Temporal, resp. rate 18, height 5' 6\" (1.676 m), weight 97.1 kg (214 lb).  Orthostatic Blood Pressures      Flowsheet Row Most Recent Value   Blood Pressure 167/82 filed at 07/23/2025 1152            No intake or output data in the 24 hours ending 07/23/25 1533    Invasive Devices       Peripheral Intravenous Line  Duration             Peripheral IV 07/23/25 Left Antecubital <1 day    Peripheral IV 07/23/25 Right Antecubital <1 day              Airway  Duration             ETT  Oral;Cuffed 6.5 mm <1 day                    Physical Exam   GEN: NAD, alert and oriented, well appearing  SKIN: dry without significant lesions or rashes  HEENT: NCAT, PERRL, EOMs intact  NECK: No JVD appreciated  CARDIOVASCULAR: irregular  LUNGS: Good respiratory effort with no audible wheezes  PSYCH: Normal mood and affect  NEURO: CN ll-Xll grossly intact      Lab Results: I have personally reviewed pertinent lab results.    Results from last 7 days   Lab Units 07/23/25  1150   WBC Thousand/uL 6.52   HEMOGLOBIN g/dL 14.3   HEMATOCRIT % 43.6   PLATELETS Thousands/uL 264     Results from last 7 days   Lab Units 07/23/25  1150   POTASSIUM mmol/L 3.6   CHLORIDE mmol/L 105   CO2 mmol/L 28   BUN mg/dL 16   CREATININE mg/dL 0.59*   CALCIUM mg/dL 8.8     Results from last 7 days   Lab Units 07/23/25  1150   INR  1.84*             Imaging: Results Review Statement: No pertinent imaging studies reviewed.  No results found for this or any previous visit.      Code Status: Level 1 - Full Code    ** Please Note: Dictation voice to text software may have been used in the creation of this document. **         [1]   Past Medical History:  Diagnosis Date    Disease of thyroid gland     Hypertension    [2]   Past Surgical History:  Procedure Laterality Date    BREAST BIOPSY Right 1996    core bx  benign    BREAST BIOPSY Right " 09/26/2024    1000 8cmfn    BREAST LUMPECTOMY Right 1990    benign    BUNIONECTOMY Left     CARDIAC ELECTROPHYSIOLOGY PROCEDURE N/A 10/18/2023    Procedure: Cardiac loop recorder implant;  Surgeon: Leonel Olivas MD;  Location: [facility];  Service: Cardiology    CARDIAC ELECTROPHYSIOLOGY PROCEDURE N/A 08/26/2024    Procedure: Cardiac eps/afib ablation PFA;  Surgeon: Jh García DO;  Location: [facility];  Service: Cardiology    HAND SURGERY Right     ganglion cyst removal    ORIF ANKLE FRACTURE Right 2005    ORIF TIBIA & FIBULA FRACTURES Left 02/13/2019    Procedure: OPEN REDUCTION W/ INTERNAL FIXATION (ORIF) ANKLE;  Surgeon: Marnie Silvestre DO;  Location: [facility];  Service: Orthopedics    THYROIDECTOMY      TONSILLECTOMY AND ADENOIDECTOMY      US GUIDED BREAST BIOPSY RIGHT COMPLETE Right 9/26/2024   [3]   Family History  Problem Relation Name Age of Onset    No Known Problems Mother      Lung cancer Father  67    No Known Problems Sister Ani     No Known Problems Daughter Niki     No Known Problems Maternal Grandmother      No Known Problems Maternal Grandfather      No Known Problems Paternal Grandmother      No Known Problems Paternal Grandfather      No Known Problems Maternal Aunt Mariama     No Known Problems Maternal Aunt Viola     No Known Problems Maternal Aunt Elvira     No Known Problems Maternal Aunt Adry     No Known Problems Paternal Aunt Saba     No Known Problems Paternal Aunt Antionette     No Known Problems Paternal Aunt Brittaney     Coronary artery disease Family      Colon cancer Cousin  80    Lung cancer Cousin  80   [4]   Social History  Tobacco Use   Smoking Status Never    Passive exposure: Yes   Smokeless Tobacco Never   [5] No Known Allergies

## 2025-07-23 NOTE — ANESTHESIA POSTPROCEDURE EVALUATION
Post-Op Assessment Note    CV Status:  Stable  Pain Score: 0    Pain management: adequate       Mental Status:  Alert and awake   Hydration Status:  Euvolemic   PONV Controlled:  Controlled   Airway Patency:  Patent     Post Op Vitals Reviewed: Yes    No anethesia notable event occurred.    Staff: CRNA, Anesthesiologist           Last Filed PACU Vitals:  Vitals Value Taken Time   Temp 97    Pulse 72 07/23/25 17:32   /57    Resp 16    SpO2 94 % 07/23/25 17:32   Vitals shown include unfiled device data.    Modified Roberta:     Vitals Value Taken Time   Activity 2 07/23/25 18:00   Respiration 2 07/23/25 18:00   Circulation 2 07/23/25 18:00   Consciousness 2 07/23/25 18:00   Oxygen Saturation 1 07/23/25 18:00     Modified Roberta Score: 9

## 2025-07-23 NOTE — ANESTHESIA PREPROCEDURE EVALUATION
Procedure:  Cardiac eps/afib ablation pfa (Chest)  Cardiac Atrial Appendage Closure (EP) (Chest)    Relevant Problems   CARDIO   (+) Benign essential HTN   (+) PAF (paroxysmal atrial fibrillation) (HCC)      ENDO   (+) Postablative hypothyroidism        Physical Exam    Airway     Mallampati score: II  TM Distance: >3 FB  Neck ROM: full      Cardiovascular      Dental       Pulmonary      Neurological      Other Findings  post-pubertal.      Anesthesia Plan  ASA Score- 3     Anesthesia Type- general with ASA Monitors.         Additional Monitors:     Airway Plan: Oral ETT.           Plan Factors-Exercise tolerance (METS): >4 METS.    Chart reviewed.                      Induction- intravenous.    Postoperative Plan- .   Monitoring Plan - Monitoring plan - standard ASA monitoring  Post Operative Pain Plan - non-opiod analgesics and multimodal analgesia    Perioperative Resuscitation Plan - Level 1 - Full Code.       Informed Consent- Anesthetic plan and risks discussed with patient.  I personally reviewed this patient with the CRNA. Discussed and agreed on the Anesthesia Plan with the CRNA..      NPO Status:  Vitals Value Taken Time   Date of last liquid 07/23/25 07/23/25 12:48   Time of last liquid 0530 07/23/25 12:48   Date of last solid 07/22/25 07/23/25 12:48   Time of last solid 1900 07/23/25 12:48

## 2025-07-24 ENCOUNTER — APPOINTMENT (INPATIENT)
Dept: RADIOLOGY | Facility: HOSPITAL | Age: 75
DRG: 317 | End: 2025-07-24
Payer: MEDICARE

## 2025-07-24 VITALS
DIASTOLIC BLOOD PRESSURE: 67 MMHG | RESPIRATION RATE: 16 BRPM | HEART RATE: 59 BPM | OXYGEN SATURATION: 96 % | TEMPERATURE: 98.3 F | WEIGHT: 214 LBS | HEIGHT: 66 IN | SYSTOLIC BLOOD PRESSURE: 118 MMHG | BODY MASS INDEX: 34.39 KG/M2

## 2025-07-24 LAB
ANION GAP SERPL CALCULATED.3IONS-SCNC: 6 MMOL/L (ref 4–13)
ATRIAL RATE: 69 BPM
ATRIAL RATE: 72 BPM
BUN SERPL-MCNC: 15 MG/DL (ref 5–25)
CALCIUM SERPL-MCNC: 7.8 MG/DL (ref 8.4–10.2)
CHLORIDE SERPL-SCNC: 108 MMOL/L (ref 96–108)
CO2 SERPL-SCNC: 24 MMOL/L (ref 21–32)
CREAT SERPL-MCNC: 0.58 MG/DL (ref 0.6–1.3)
ERYTHROCYTE [DISTWIDTH] IN BLOOD BY AUTOMATED COUNT: 13.6 % (ref 11.6–15.1)
GFR SERPL CREATININE-BSD FRML MDRD: 92 ML/MIN/1.73SQ M
GLUCOSE SERPL-MCNC: 124 MG/DL (ref 65–140)
HCT VFR BLD AUTO: 37.4 % (ref 34.8–46.1)
HGB BLD-MCNC: 12.3 G/DL (ref 11.5–15.4)
MCH RBC QN AUTO: 29.7 PG (ref 26.8–34.3)
MCHC RBC AUTO-ENTMCNC: 32.9 G/DL (ref 31.4–37.4)
MCV RBC AUTO: 90 FL (ref 82–98)
P AXIS: 64 DEGREES
P AXIS: 72 DEGREES
PLATELET # BLD AUTO: 224 THOUSANDS/UL (ref 149–390)
PMV BLD AUTO: 10.1 FL (ref 8.9–12.7)
POTASSIUM SERPL-SCNC: 3.7 MMOL/L (ref 3.5–5.3)
PR INTERVAL: 156 MS
PR INTERVAL: 166 MS
QRS AXIS: 63 DEGREES
QRS AXIS: 67 DEGREES
QRSD INTERVAL: 80 MS
QRSD INTERVAL: 80 MS
QT INTERVAL: 402 MS
QT INTERVAL: 416 MS
QTC INTERVAL: 430 MS
QTC INTERVAL: 455 MS
RBC # BLD AUTO: 4.14 MILLION/UL (ref 3.81–5.12)
SODIUM SERPL-SCNC: 138 MMOL/L (ref 135–147)
T WAVE AXIS: 10 DEGREES
T WAVE AXIS: 21 DEGREES
VENTRICULAR RATE: 69 BPM
VENTRICULAR RATE: 72 BPM
WBC # BLD AUTO: 7.27 THOUSAND/UL (ref 4.31–10.16)

## 2025-07-24 PROCEDURE — 80048 BASIC METABOLIC PNL TOTAL CA: CPT | Performed by: PHYSICIAN ASSISTANT

## 2025-07-24 PROCEDURE — 93010 ELECTROCARDIOGRAM REPORT: CPT | Performed by: INTERNAL MEDICINE

## 2025-07-24 PROCEDURE — 71046 X-RAY EXAM CHEST 2 VIEWS: CPT

## 2025-07-24 PROCEDURE — 85027 COMPLETE CBC AUTOMATED: CPT | Performed by: PHYSICIAN ASSISTANT

## 2025-07-24 RX ORDER — ASPIRIN 81 MG/1
81 TABLET ORAL DAILY
Start: 2025-07-25

## 2025-07-24 RX ADMIN — NEBIVOLOL 5 MG: 5 TABLET ORAL at 08:09

## 2025-07-24 RX ADMIN — LEVOTHYROXINE SODIUM 100 MCG: 0.1 TABLET ORAL at 08:10

## 2025-07-24 RX ADMIN — RIVAROXABAN 20 MG: 20 TABLET, FILM COATED ORAL at 08:10

## 2025-07-24 RX ADMIN — Medication 250 MG: at 08:08

## 2025-07-24 RX ADMIN — ASPIRIN 81 MG: 81 TABLET ORAL at 08:09

## 2025-07-24 RX ADMIN — PANTOPRAZOLE SODIUM 40 MG: 40 TABLET, DELAYED RELEASE ORAL at 06:31

## 2025-07-24 RX ADMIN — ACETAMINOPHEN 975 MG: 325 TABLET ORAL at 00:25

## 2025-07-24 NOTE — DISCHARGE SUMMARY
Discharge Summary - Inés Duran 74 y.o. female MRN: [id number]    Unit/Bed#: [facility]-322-01 Encounter: [id number]      Admission Date: 7/23/2025   Discharge Date: 7/24/2025    Discharge Diagnosis:   Assessment & Plan  PAF (paroxysmal atrial fibrillation) (HCC)  Had thyroid dysfunction that was felt to have contributed to arrhythmias  9/2022 - initial Zio showed only PAC's but was recommended to undergo loop recorder implantation for further monitoring  10/2023 - loop implanted   Atrial fibrillation was picked up  Started on flecainide and metoprolol  Seen by DT EP with breakthrough episodes on flec - ablation recommended  8/2024 - underwent PVI with PFA by Dr. García  11/2024 - seen by EP SS for watchman consideration  Was not felt to be candidate as not significant enough burden  Recently more recurrence - set up for repeat afib/Watchman    - anticoagulated with Xarelto / Podha7Orco score of 3 (age, sex, HTN)  - EF of 65% per echo 9/2023 / no dilation of left atrium noted  - rate control: nebivolol / metoprolol in the past  - antiarrhythmic therapy: none / flecainide in the past  - prior cardioversion:  - prior ablation: prior pulmonary vein isolation with PFA by Dr. García on 8/26/2024  - now ***  Medtronic loop recorder present  - implanted 10/2023 for atrial fibrillation surveillance  Benign essential HTN  - maintained on nebivolol  Postablative hypothyroidism    History of GI bleed  - reasoning for Watchman  Status post ablation of atrial fibrillation (PVI - PFA) 8/26/2024    Status post ablation of atrial fibrillation/flutter (redo with posterior wall isolation and CTI line - PFA) 7/24/2025        Procedures Performed:   1. Atrial fibrillation ablation using Pulsed Field ablation  2. Cavotricuspid Isthmus Right atrial flutter ablation  3. Left atrial posterior wall isolation to treat atrial fibrillation   3. 3-D map with NAVX   4. Intracardiac Echocardiography ICE.  5. Ultrasound guided venous access  6.  EP testing with isoproterenol  7.LEFT ATRIAL APPENDAGE OCCLUSION Watchman  Orders Placed This Encounter   Procedures    Cardiac ep lab eps/ablations       Consultants: None***    HPI: Please refer to the initial history and physical as well as procedure notes for full details. Briefly, Inés Duran is a 74 y.o. year old female with ***   She was seen by *** as an outpatient, and *** was recommended. She presented this hospital admission to undergo this procedure.     Hospital Course: Inés Duran presented at her baseline state of health. After the procedure was explained in detail and consent was obtained, she underwent *** without complications. She tolerated the procedure well. She was then monitored overnight for further observation.    There were no acute issues or events overnight. The following morning She denied all cardiac complaints, including chest pain/pressure, dyspnea, palpitations, dizziness, lightheadedness, or syncope. Her vital signs were reviewed and labs are stable. Telemetry showed ***. Her groins were soft without significant hematoma or recurrent bleeding. Physical exam on the day of discharge was as follows:  ***  GEN: NAD, alert and oriented, well appearing  SKIN: dry without significant lesions or rashes  HEENT: NCAT, PERRL, EOMs intact  NECK: No JVD or carotid bruits appreciated  CARDIOVASCULAR: RRR, normal S1, S2 without murmurs, rubs, or gallops appreciated  LUNGS: Clear to auscultation bilaterally without wheezes, rhonchi, or rales  ABDOMEN: Soft, nontender, nondistended  EXTREMITIES/VASCULAR: perfused without clubbing, cyanosis, or edema b/l  PSYCH: Normal mood and affect  NEURO: CN ll-Xll grossly intact    She was given routine post ablation discharge instructions and restrictions, and these were explained in detail. She was given a follow up appointment with ***, and she was instructed to follow up with her primary cardiologist as previously instructed.    In terms of her  medications, ***    She is stable for discharge at this time with all questions answered. She was discussed in detail with  *** who is in agreement with this discharge summary.     Discharge Medications:  See after visit summary for reconciled discharge medications provided to patient and family.      Medications Prior to Admission:     Cholecalciferol (Vitamin D) 50 MCG (2000 UT) tablet    levothyroxine 100 mcg tablet    Multiple Vitamins-Minerals (multivitamin with minerals) tablet    nebivolol (BYSTOLIC) 5 mg tablet    omeprazole (PriLOSEC) 20 mg delayed release capsule    rivaroxaban (Xarelto) 20 mg tablet    saccharomyces boulardii (FLORASTOR) 250 mg capsule    flecainide (TAMBOCOR) 100 mg tablet      Pertininet Labs/diagnostics:  CBC with diff:   Results from last 7 days   Lab Units 07/24/25  0444 07/23/25  1150   WBC Thousand/uL 7.27 6.52   HEMOGLOBIN g/dL 12.3 14.3   HEMATOCRIT % 37.4 43.6   MCV fL 90 90   PLATELETS Thousands/uL 224 264   RBC Million/uL 4.14 4.83   MCH pg 29.7 29.6   MCHC g/dL 32.9 32.8   RDW % 13.6 13.1   MPV fL 10.1 10.0       BMP:  Results from last 7 days   Lab Units 07/24/25  0444 07/23/25  1150   POTASSIUM mmol/L 3.7 3.6   CHLORIDE mmol/L 108 105   CO2 mmol/L 24 28   BUN mg/dL 15 16   CREATININE mg/dL 0.58* 0.59*   CALCIUM mg/dL 7.8* 8.8       Magnesium:       Coags:   Results from last 7 days   Lab Units 07/23/25  1150   INR  1.84*         Complications: none    Condition at Discharge: good     Discharge instructions/Information to patient and family:   See after visit summary for information provided to patient and family.      Provisions for Follow-Up Care:  See after visit summary for information related to follow-up care and any pertinent home health orders.      Disposition: Home    Planned Readmission: No    Discharge Statement   I spent 45 minutes minutes discharging the patient. This time was spent on the day of discharge. I had direct contact with the patient on the day of  discharge. Additional documentation is required if more than 30 minutes were spent on discharge. Evaluating the incision, discussing discharge instructions and restrictions, arranging follow up appointments, discussing medications

## 2025-07-25 PROBLEM — Z86.79 STATUS POST ABLATION OF ATRIAL FIBRILLATION: Status: ACTIVE | Noted: 2025-07-25

## 2025-07-25 PROBLEM — Z98.890 STATUS POST ABLATION OF ATRIAL FLUTTER: Status: ACTIVE | Noted: 2025-07-25

## 2025-07-25 PROBLEM — Z98.890 STATUS POST ABLATION OF ATRIAL FIBRILLATION: Status: ACTIVE | Noted: 2025-07-25

## 2025-07-25 PROBLEM — Z86.79 STATUS POST ABLATION OF ATRIAL FLUTTER: Status: ACTIVE | Noted: 2025-07-25

## 2025-07-25 NOTE — ASSESSMENT & PLAN NOTE
Had thyroid dysfunction that was felt to have contributed to arrhythmias  9/2022 - initial Zio showed only PAC's but was recommended to undergo loop recorder implantation for further monitoring  10/2023 - loop implanted   Atrial fibrillation was picked up  Started on flecainide and metoprolol  Seen by DT EP with breakthrough episodes on flec - ablation recommended  8/2024 - underwent PVI with PFA by Dr. García  11/2024 - seen by EP SS for watchman consideration  Was not felt to be candidate as not significant enough burden  Recently more recurrence - set up for repeat afib/Watchman    - anticoagulated with Xarelto / [id number] score of 3 (age, sex, HTN)  - EF of 65% per echo 9/2023 / no dilation of left atrium noted  - rate control: nebivolol / metoprolol in the past  - antiarrhythmic therapy: none / flecainide in the past  - prior cardioversion:  - prior ablation: prior pulmonary vein isolation with PFA by Dr. García on 8/26/2024  - now ***

## 2025-08-01 DIAGNOSIS — I48.0 PAF (PAROXYSMAL ATRIAL FIBRILLATION) (HCC): ICD-10-CM

## 2025-08-13 ENCOUNTER — REMOTE DEVICE CLINIC VISIT (OUTPATIENT)
Dept: CARDIOLOGY CLINIC | Facility: CLINIC | Age: 75
End: 2025-08-13
Payer: MEDICARE

## (undated) DEVICE — CATH ULTRASOUND ACUNAV ICE 8FR 90CM GE VIVID-I

## (undated) DEVICE — WEBRIL 6 IN UNSTERILE

## (undated) DEVICE — Device

## (undated) DEVICE — GLOVE SRG BIOGEL 6.5

## (undated) DEVICE — CUFF TOURNIQUET 30 X 4 IN QUICK CONNECT DISP 1BLA

## (undated) DEVICE — CATH ABLATION FARAWAVE PULSED FIELD 31MM

## (undated) DEVICE — VIOLET BRAIDED (POLYGLACTIN 910), SYNTHETIC ABSORBABLE SUTURE: Brand: COATED VICRYL

## (undated) DEVICE — 1.25MM NON-THREADED GUIDE WIRE 150MM

## (undated) DEVICE — GAUZE SPONGES,USP TYPE VII GAUZE, 12 PLY: Brand: CURITY

## (undated) DEVICE — CATH DIAG 5FR .035 100CM PIG CSC 20

## (undated) DEVICE — CATH EP ADVISOR HD GRID MAPPING 8F

## (undated) DEVICE — COBAN 4 IN STERILE

## (undated) DEVICE — BETHLEHEM UNIVERSAL  MIONR EXT: Brand: CARDINAL HEALTH

## (undated) DEVICE — 2.5MM DRILL BIT/QC/GOLD/110MM

## (undated) DEVICE — PINNACLE INTRODUCER SHEATH: Brand: PINNACLE

## (undated) DEVICE — OCCLUSIVE GAUZE STRIP,3% BISMUTH TRIBROMOPHENATE IN PETROLATUM BLEND: Brand: XEROFORM

## (undated) DEVICE — SHEATH STEERABLE FARADRIVE

## (undated) DEVICE — 10FR FRAZIER SUCTION HANDLE: Brand: CARDINAL HEALTH

## (undated) DEVICE — 3M™ STERI-DRAPE™ U-DRAPE 1015: Brand: STERI-DRAPE™

## (undated) DEVICE — CABLE CATH CONNECTION FARASTAR

## (undated) DEVICE — ROSEN CURVED WIRE GUIDE: Brand: ROSEN

## (undated) DEVICE — AMPLATZ EXTRA STIFF WIRE GUIDE: Brand: AMPLATZ

## (undated) DEVICE — GUIDE SHEATH SLO 8.5 FR

## (undated) DEVICE — PAD CAST 4 IN COTTON NON STERILE

## (undated) DEVICE — DRAPE C-ARMOUR

## (undated) DEVICE — 3000CC GUARDIAN II: Brand: GUARDIAN

## (undated) DEVICE — ACE WRAP 6 IN UNSTERILE

## (undated) DEVICE — PLUMEPEN PRO 10FT

## (undated) DEVICE — CHLORAPREP HI-LITE 26ML ORANGE

## (undated) DEVICE — 3.5MM CORTEX SCREW SELF-TAPPING 16MM: Type: IMPLANTABLE DEVICE | Status: NON-FUNCTIONAL

## (undated) DEVICE — NEEDLE TRANSSEPTAL BRK-1 71CM

## (undated) DEVICE — INTENDED FOR TISSUE SEPARATION, AND OTHER PROCEDURES THAT REQUIRE A SHARP SURGICAL BLADE TO PUNCTURE OR CUT.: Brand: BARD-PARKER ® CARBON RIB-BACK BLADES

## (undated) DEVICE — PADDING CAST 4 IN  COTTON STRL

## (undated) DEVICE — SUT VICRYL 2-0 CT-2 27 IN J269H

## (undated) DEVICE — 3.5MM CORTEX SCREW SELF-TAPPING 30MM: Type: IMPLANTABLE DEVICE | Status: NON-FUNCTIONAL

## (undated) DEVICE — TUBING SUCTION 5MM X 12 FT

## (undated) DEVICE — CATH EP  INQUIRY 6F 2-5-2MM  LRG CRV STERABLE DECAPOLAR 110CM

## (undated) DEVICE — CAPIT WATCHMAN FLX LAA PROCEDURE DEVICE

## (undated) DEVICE — CAST PADDING 4 IN SYNTHETIC NON-STRL

## (undated) DEVICE — SUT ETHILON 3-0 FSLX 30 IN 1673H

## (undated) DEVICE — IMPERVIOUS STOCKINETTE: Brand: DEROYAL

## (undated) DEVICE — REF PATCH ENSITE X

## (undated) DEVICE — SPLINT ORTHO-GLASS 4IN X 15FT

## (undated) DEVICE — 4.0MM CANCELLOUS BONE SCREW FULLY THREADED/12MM: Type: IMPLANTABLE DEVICE | Status: NON-FUNCTIONAL

## (undated) DEVICE — GLOVE INDICATOR PI UNDERGLOVE SZ 7 BLUE

## (undated) DEVICE — SCD SEQUENTIAL COMPRESSION COMFORT SLEEVE MEDIUM KNEE LENGTH: Brand: KENDALL SCD

## (undated) DEVICE — DRAPE C-ARM X-RAY

## (undated) DEVICE — ACE WRAP 4 IN UNSTERILE

## (undated) DEVICE — WEBRIL 4IN X 4YDS

## (undated) DEVICE — DRAPE EQUIPMENT RF WAND

## (undated) DEVICE — 2.7MM CANNULATED DRILL BIT/QC 160MM

## (undated) DEVICE — GLOVE RADIATION SZ 7

## (undated) DEVICE — CAST PLASTER 4 IN ROLL

## (undated) DEVICE — 3.5MM CORTEX SCREW SELF-TAPPING 24MM: Type: IMPLANTABLE DEVICE | Status: NON-FUNCTIONAL

## (undated) DEVICE — 3.5MM DRILL BIT/QC/110MM